# Patient Record
Sex: FEMALE
[De-identification: names, ages, dates, MRNs, and addresses within clinical notes are randomized per-mention and may not be internally consistent; named-entity substitution may affect disease eponyms.]

---

## 2018-07-10 ENCOUNTER — HOSPITAL ENCOUNTER (EMERGENCY)
Dept: HOSPITAL 97 - ER | Age: 23
Discharge: HOME | End: 2018-07-10
Payer: COMMERCIAL

## 2018-07-10 DIAGNOSIS — R10.9: Primary | ICD-10-CM

## 2018-07-10 DIAGNOSIS — D64.9: ICD-10-CM

## 2018-07-10 LAB
ALBUMIN SERPL BCP-MCNC: 3.5 G/DL (ref 3.4–5)
ALP SERPL-CCNC: 74 U/L (ref 45–117)
ALT SERPL W P-5'-P-CCNC: 19 U/L (ref 12–78)
AST SERPL W P-5'-P-CCNC: 21 U/L (ref 15–37)
BUN BLD-MCNC: 8 MG/DL (ref 7–18)
GLUCOSE SERPLBLD-MCNC: 89 MG/DL (ref 74–106)
HCT VFR BLD CALC: 32.1 % (ref 36–45)
INR BLD: 1.08
LYMPHOCYTES # SPEC AUTO: 1.5 K/UL (ref 0.7–4.9)
MAGNESIUM SERPL-MCNC: 1.9 MG/DL (ref 1.8–2.4)
MCH RBC QN AUTO: 27.2 PG (ref 27–35)
MCV RBC: 81.7 FL (ref 80–100)
PMV BLD: 8.7 FL (ref 7.6–11.3)
POTASSIUM SERPL-SCNC: 3.5 MMOL/L (ref 3.5–5.1)
RBC # BLD: 3.93 M/UL (ref 3.86–4.86)

## 2018-07-10 PROCEDURE — 81025 URINE PREGNANCY TEST: CPT

## 2018-07-10 PROCEDURE — 81003 URINALYSIS AUTO W/O SCOPE: CPT

## 2018-07-10 PROCEDURE — 83735 ASSAY OF MAGNESIUM: CPT

## 2018-07-10 PROCEDURE — 71275 CT ANGIOGRAPHY CHEST: CPT

## 2018-07-10 PROCEDURE — 96374 THER/PROPH/DIAG INJ IV PUSH: CPT

## 2018-07-10 PROCEDURE — 36415 COLL VENOUS BLD VENIPUNCTURE: CPT

## 2018-07-10 PROCEDURE — 85025 COMPLETE CBC W/AUTO DIFF WBC: CPT

## 2018-07-10 PROCEDURE — 71045 X-RAY EXAM CHEST 1 VIEW: CPT

## 2018-07-10 PROCEDURE — 99285 EMERGENCY DEPT VISIT HI MDM: CPT

## 2018-07-10 PROCEDURE — 74175 CTA ABDOMEN W/CONTRAST: CPT

## 2018-07-10 PROCEDURE — 93005 ELECTROCARDIOGRAM TRACING: CPT

## 2018-07-10 PROCEDURE — 96361 HYDRATE IV INFUSION ADD-ON: CPT

## 2018-07-10 PROCEDURE — 85610 PROTHROMBIN TIME: CPT

## 2018-07-10 PROCEDURE — 80076 HEPATIC FUNCTION PANEL: CPT

## 2018-07-10 PROCEDURE — 84484 ASSAY OF TROPONIN QUANT: CPT

## 2018-07-10 PROCEDURE — 96375 TX/PRO/DX INJ NEW DRUG ADDON: CPT

## 2018-07-10 PROCEDURE — 80048 BASIC METABOLIC PNL TOTAL CA: CPT

## 2018-07-10 NOTE — RAD REPORT
EXAM DESCRIPTION:  CT - Angio  Aorta For Dissection - 7/10/2018 1:07 pm

 

CLINICAL HISTORY:  . Chest pain/abdominal pain

 

COMPARISON:  None

 

TECHNIQUE:  Computed tomography angiography of the chest, abdomen pelvis were obtained. 100 cc Isovue
 370 was administered intravenously. Coronal and sagittal reconstruction were performed.

 

MIP 3D reconstruction was performed

 

All CT scans are performed using dose optimization technique as appropriate and may include automated
 exposure control or mA/KV adjustment according to patient size.

 

FINDINGS:   An aortic dissection is not seen. An aortic aneurysm is not displayed.

 

The celiac, SMA and CADY are patent .

 

A lung consolidation is not present. A pericardial effusion is not seen. A pleural effusion is not  n
oted.

 

The liver,spleen, pancreas adrenals kidneys demonstrate no significant abnormality.

 

The appendix is normal. There no evidence diverticulitis. No ascites is noted.

 

A ventral hernia within the mid abdomen contains fat. The neck measures 23 millimeters. The hernia sa
c measures 50  millimeters.

 

The endometrium appears mildly prominent

 

IMPRESSION:   Negative for an aortic dissection.

 

Ventral hernia

 

Mildly prominent endometrium probably related to the normal menstrual cycle. Follow-up ultrasound cou
ple months is recommended to reassess the endometrium

## 2018-07-10 NOTE — ER
Nurse's Notes                                                                                     

 [facility]                                                                

Name: Lorena Rice                                                                                

Age: 22 yrs                                                                                       

Sex: Female                                                                                       

: 1995                                                                                   

MRN: [id number]                                                                                   

Arrival Date: 07/10/2018                                                                          

Time: 12:04                                                                                       

Account#: [id number]                                                                            

Bed 15                                                                                            

Private MD:                                                                                       

Diagnosis: Chest pain, unspecified;Unspecified abdominal pain;Anemia, unspecified                 

                                                                                                  

Presentation:                                                                                     

07/10                                                                                             

12:06 Presenting complaint: EMS states: Pt was preforming normal work duties as    ph  

      at [place] and began to experience L sided chest pain that radiates to the back, denies N/V     

      or SOB, VSS, 12 lead NSR, 324 aspirin administered. Transition of care: patient was not     

      received from another setting of care. Onset of symptoms was July 10, 2018. Risk            

      Assessment: Do you want to hurt yourself or someone else? Patient reports no desire to      

      harm self or others. Initial Sepsis Screen: Does the patient meet any 2 criteria? No.       

      Patient's initial sepsis screen is negative. Does the patient have a suspected source       

      of infection? No. Patient's initial sepsis screen is negative. Care prior to arrival:       

      Medication(s) given: ASA, 81 mg, x 4.                                                       

12:06 Method Of Arrival: EMS: [facility]                                                         ph  

12:06 Acuity: VANESSA 3                                                                           ph  

                                                                                                  

OB/GYN:                                                                                           

12:08 LMP 2018                                                                           ph  

                                                                                                  

Historical:                                                                                       

- Allergies:                                                                                      

12:10 No Known Allergies;                                                                     ph  

- Home Meds:                                                                                      

12:10 oral contraceptive [Active];                                                            ph  

- PMHx:                                                                                           

12:10 None;                                                                                   ph  

- PSHx:                                                                                           

12:10 None;                                                                                   ph  

                                                                                                  

- Immunization history:: Adult Immunizations unknown.                                             

- Social history:: Smoking status: Patient/guardian denies using tobacco.                         

- Ebola Screening: : No symptoms or risks identified at this time.                                

                                                                                                  

                                                                                                  

Screenin:10 Abuse screen: Denies threats or abuse. Nutritional screening: No deficits noted.        rb1 

      Tuberculosis screening: No symptoms or risk factors identified. Fall Risk None              

      identified.                                                                                 

                                                                                                  

Assessment:                                                                                       

12:10 General: Appears uncomfortable, obese, Behavior is calm, cooperative. Pain: Complains   rb1 

      of pain in left chest Pain radiates to back Pain currently is 8 out of 10 on a pain         

      scale. Pain began 1 hour ago. Neuro: Level of Consciousness is awake, alert, obeys          

      commands, Oriented to person, place, time, situation. Cardiovascular: Capillary refill      

      < 3 seconds is brisk in bilateral fingers. Respiratory: Airway is patent Respiratory        

      effort is even, unlabored, Respiratory pattern is regular, symmetrical. GI: No signs        

      and/or symptoms were reported involving the gastrointestinal system. : No signs           

      and/or symptoms were reported regarding the genitourinary system. Derm: Skin is dry,        

      Skin is normal, Skin temperature is warm. Musculoskeletal: Range of motion: intact in       

      all extremities.                                                                            

13:10 Reassessment: Patient appears in no apparent distress at this time. Patient and/or      rb1 

      family updated on plan of care and expected duration. Pain level reassessed. Patient is     

      alert, oriented x 3, equal unlabored respirations, skin warm/dry/pink. Coworkers at         

      bedside.                                                                                    

14:09 Reassessment: Patient appears in no apparent distress at this time. No changes from     rb1 

      previously documented assessment.                                                           

                                                                                                  

Vital Signs:                                                                                      

12:08  / 85; Pulse 64; Resp 18; Temp 97.7(TE); Pulse Ox 100% on R/A; Weight 101.6 kg;   ph  

      Height 5 ft. 8 in. (172.72 cm); Pain 8/10;                                                  

13:08  / 95; Pulse 59; Resp 19; Pulse Ox 100% on R/A;                                   dh3 

13:53  / 83; Pulse 58; Resp 17; Pulse Ox 100% on R/A;                                   dh3 

14:39  / 95; Pulse 58; Resp 20; Pulse Ox 100% on R/A; Pain 3/10;                        rb1 

12:08 Body Mass Index 34.06 (101.60 kg, 172.72 cm)                                            ph  

                                                                                                  

ED Course:                                                                                        

12:04 Patient arrived in ED.                                                                  ss  

12:05 Silver Anthony MD is Attending Physician.                                              gs  

12:08 Triage completed.                                                                       ph  

12:10 Arm band placed on.                                                                     ph  

12:10 Patient has correct armband on for positive identification. Placed in gown. Bed in low  rb1 

      position. Call light in reach. Side rails up X2. Cardiac monitor on. Pulse ox on. NIBP      

      on. Warm blanket given.                                                                     

12:10 Inserted saline lock: 20 gauge in right antecubital area, using aseptic technique.      dh3 

      Blood collected.                                                                            

12:10 Patient maintains SpO2 saturation greater than 95% on room air.                         rb1 

12:20 Beulah Solano, RN is Primary Nurse.                                                   rb1 

12:20 Initial lab(s) drawn, by me, sent to lab.                                               3 

12:45 Urine collected: clean catch specimen, clear.                                           3 

12:49 X-ray completed. Portable x-ray completed in exam room.                                 jr1 

12:50 XRAY Chest (1 view) In Process Unspecified.                                             EDMS

12:57 Patient moved to CT.                                                                    vr  

12:57 Patient moved to CT.                                                                      

13:03 CT completed. Patient tolerated procedure well. Patient moved back from CT.               

13:03 EKG done, by EKG tech. reviewed by Silver Anthony MD.                                    [facility] 

13:07 CT Aorta for Dissection In Process Unspecified.                                         EDMS

14:40 No provider procedures requiring assistance completed. IV discontinued, intact,         rb1 

      bleeding controlled, No redness/swelling at site. Pressure dressing applied.                

                                                                                                  

Administered Medications:                                                                         

12:38 Drug: Zofran 4 mg Route: IVP; Site: right antecubital;                                  rb1 

13:00 Follow up: Response: No adverse reaction; Nausea is decreased                           rb1 

12:42 Drug: fentaNYL (PF) 25 mcg Route: IVP; Site: right antecubital;                         rb1 

13:00 Follow up: Response: No adverse reaction; Pain is decreased                             rb1 

13:30 Drug: NS 0.9% 1000 ml Route: IV; Rate: 1 bolus; Site: right antecubital;                rb1 

14:34 Follow up: IV Status: Completed infusion                                                rb1 

                                                                                                  

                                                                                                  

Outcome:                                                                                          

14:17 Discharge ordered by MD.                                                                  

14:40 Discharged to home ambulatory.                                                          rb1 

14:40 Condition: stable                                                                           

14:40 Discharge instructions given to patient, Instructed on discharge instructions, follow       

      up and referral plans. Demonstrated understanding of instructions, follow-up care,          

      Prescriptions given X none                                                                  

14:40 Patient left the ED.                                                                    rb1 

                                                                                                  

Signatures:                                                                                       

Dispatcher [employer]                           EDMS                                                 

Sandie Eugene                           jr1                                                  

Deloris Dave, Muna Yo RN                                                                                 

Caryn Stanton RN RN ph Warren, Kacie                                                                                 

Beulah Solano, RN                     RN   [facility]                                                  

Joan Solis                              [facility]                                                  

Silver Anthony MD MD                                                      

Jeannine Duckworth                              3                                                  

                                                                                                  

Corrections: (The following items were deleted from the chart)                                    

14:40 13:10 Reassessment: Patient appears in no apparent distress at this time. Patient       rb1 

      and/or family updated on plan of care and expected duration. Pain level reassessed.         

      Patient is alert, oriented x 3, equal unlabored respirations, skin warm/dry/pink. rb1       

14:48 14:45 Patient left the ED. rb1                                                          rb1 

                                                                                                  

**************************************************************************************************

## 2018-07-10 NOTE — RAD REPORT
EXAM DESCRIPTION:  Smita Single View7/10/2018 12:53 pm

 

CLINICAL HISTORY:  Chest pain

 

COMPARISON:  none

 

FINDINGS:   The lungs appear clear of acute infiltrate. The heart is upper limits normal size

 

IMPRESSION:   No acute abnormalities displayed

## 2018-07-10 NOTE — EKG
Test Date:    2018-07-10               Test Time:    12:23:01

Technician:   YULY                                     

                                                     

MEASUREMENT RESULTS:                                       

Intervals:                                           

Rate:         60                                     

AZ:           180                                    

QRSD:         80                                     

QT:           422                                    

QTc:          422                                    

Axis:                                                

P:            37                                     

AZ:           180                                    

QRS:          18                                     

T:            20                                     

                                                     

INTERPRETIVE STATEMENTS:                                       

                                                     

Normal sinus rhythm

Normal ECG

No previous ECG available for comparison



Electronically Signed On 07-10-18 21:36:25 CDT by Moose Burch

## 2018-09-11 ENCOUNTER — HOSPITAL ENCOUNTER (EMERGENCY)
Dept: HOSPITAL 97 - ER | Age: 23
Discharge: HOME | End: 2018-09-11
Payer: COMMERCIAL

## 2018-09-11 DIAGNOSIS — R53.1: Primary | ICD-10-CM

## 2018-09-11 PROCEDURE — 81025 URINE PREGNANCY TEST: CPT

## 2018-09-11 PROCEDURE — 99281 EMR DPT VST MAYX REQ PHY/QHP: CPT

## 2018-09-11 PROCEDURE — 81003 URINALYSIS AUTO W/O SCOPE: CPT

## 2018-09-11 NOTE — EDPHYS
Physician Documentation                                                                           

 [facility]                                                                

Name: Lorena Rice                                                                                

Age: 23 yrs                                                                                       

Sex: Female                                                                                       

: 1995                                                                                   

MRN: [id number]                                                                                   

Arrival Date: 2018                                                                          

Time: 12:07                                                                                       

Account#: [id number]                                                                            

Bed 8                                                                                             

Private MD: None, None                                                                            

ED Physician Brendon Kerr                                                                      

HPI:                                                                                              

                                                                                             

12:46 This 23 yrs old Unknown Female presents to ER via Ambulatory with complaints of         antonino 

      Dizziness, Weakness.                                                                        

12:46 The patient presents with dizziness, generalized weakness. Onset: The symptoms/episode  antonino 

      began/occurred 3 day(s) ago. Context: occurred at work. Modifying factors: The symptoms     

      are alleviated by nothing, the symptoms are aggravated by nothing. Associated signs and     

      symptoms: The patient has no apparent associated signs or symptoms. Severity of             

      symptoms: At their worst the symptoms were mild in the emergency department the             

      symptoms are unchanged. Patient's baseline: Neuro: alert and fully oriented. The            

      patient has not experienced similar symptoms in the past.                                   

                                                                                                  

OB/GYN:                                                                                           

12:12 LMP 2018                                                                             

                                                                                                  

Historical:                                                                                       

- Allergies:                                                                                      

12:11 No Known Allergies;                                                                     hj  

- Home Meds:                                                                                      

12:11 Oral Contraceptive [Active];                                                            hj  

- PMHx:                                                                                           

12:11 None;                                                                                   hj  

- PSHx:                                                                                           

12:11 None;                                                                                   hj  

                                                                                                  

- Immunization history:: Adult Immunizations up to date.                                          

- Social history:: Smoking status: Patient/guardian denies using tobacco,                         

  Patient/guardian denies using alcohol.                                                          

- Ebola Screening: : Patient negative for fever greater than or equal to 101.5 degrees            

  Fahrenheit, and additional compatible Ebola Virus Disease symptoms Patient denies               

  exposure to infectious person Patient denies travel to an Ebola-affected area in the            

  21 days before illness onset.                                                                   

- Family history:: not pertinent.                                                                 

                                                                                                  

                                                                                                  

ROS:                                                                                              

12:46 Constitutional: Negative for fever, chills, and weight loss, Eyes: Negative for injury, antonino 

      pain, redness, and discharge, ENT: Negative for injury, pain, and discharge, Neck:          

      Negative for injury, pain, and swelling, Cardiovascular: Negative for chest pain,           

      palpitations, and edema, Respiratory: Negative for shortness of breath, cough,              

      wheezing, and pleuritic chest pain, Abdomen/GI: Negative for abdominal pain, nausea,        

      vomiting, diarrhea, and constipation, Back: Negative for injury and pain, : Negative      

      for injury, bleeding, discharge, and swelling, MS/Extremity: Negative for injury and        

      deformity, Skin: Negative for injury, rash, and discoloration, Psych: Negative for          

      depression, anxiety, suicide ideation, homicidal ideation, and hallucinations,              

      Allergy/Immunology: Negative for hives, rash, and allergies, Endocrine: Negative for        

      neck swelling, polydipsia, polyuria, polyphagia, and marked weight changes,                 

      Hematologic/Lymphatic: Negative for swollen nodes, abnormal bleeding, and unusual           

      bruising.                                                                                   

12:46 Neuro: Positive for dizziness, weakness.                                                    

                                                                                                  

Exam:                                                                                             

12:46 Constitutional:  This is a well developed, well nourished patient who is awake, alert,  antonino 

      and in no acute distress. Head/Face:  Normocephalic, atraumatic. Eyes:  Pupils equal        

      round and reactive to light, extra-ocular motions intact.  Lids and lashes normal.          

      Conjunctiva and sclera are non-icteric and not injected.  Cornea within normal limits.      

      Periorbital areas with no swelling, redness, or edema. ENT:  Nares patent. No nasal         

      discharge, no septal abnormalities noted.  Tympanic membranes are normal and external       

      auditory canals are clear.  Oropharynx with no redness, swelling, or masses, exudates,      

      or evidence of obstruction, uvula midline.  Mucous membranes moist. Neck:  Trachea          

      midline, no thyromegaly or masses palpated, and no cervical lymphadenopathy.  Supple,       

      full range of motion without nuchal rigidity, or vertebral point tenderness.  No            

      Meningismus. Chest/axilla:  Normal chest wall appearance and motion.  Nontender with no     

      deformity.  No lesions are appreciated. Cardiovascular:  Regular rate and rhythm with a     

      normal S1 and S2.  No gallops, murmurs, or rubs.  Normal PMI, no JVD.  No pulse             

      deficits. Respiratory:  Lungs have equal breath sounds bilaterally, clear to                

      auscultation and percussion.  No rales, rhonchi or wheezes noted.  No increased work of     

      breathing, no retractions or nasal flaring. Abdomen/GI:  Soft, non-tender, with normal      

      bowel sounds.  No distension or tympany.  No guarding or rebound.  No evidence of           

      tenderness throughout. Back:  No spinal tenderness.  No costovertebral tenderness.          

      Full range of motion. Skin:  Warm, dry with normal turgor.  Normal color with no            

      rashes, no lesions, and no evidence of cellulitis. MS/ Extremity:  Pulses equal, no         

      cyanosis.  Neurovascular intact.  Full, normal range of motion. Neuro:  Awake and           

      alert, GCS 15, oriented to person, place, time, and situation.  Cranial nerves II-XII       

      grossly intact.  Motor strength 5/5 in all extremities.  Sensory grossly intact.            

      Cerebellar exam normal.  Normal gait. Psych:  Awake, alert, with orientation to person,     

      place and time.  Behavior, mood, and affect are within normal limits.                       

                                                                                                  

Vital Signs:                                                                                      

12:12  / 70; Pulse 85; Resp 18; Temp 97.1(TE); Pulse Ox 99% on R/A; Weight 104.33 kg;   hj  

      Height 5 ft. 8 in. (172.72 cm); Pain 5/10;                                                  

12:12 Body Mass Index 34.97 (104.33 kg, 172.72 cm)                                            hj  

                                                                                                  

MDM:                                                                                              

12:18 Patient medically screened.                                                             [facility] 

                                                                                                  

                                                                                             

12:39 Order name: Urine Dipstick--Ancillary (enter results); Complete Time: 12:46             [facility] 

                                                                                             

12:48 Order name: Urine Pregnancy--Ancillary (enter results)                                  [facility] 

                                                                                             

12:46 Order name: Urine Pregnancy Test (obtain specimen); Complete Time: 13:05                [facility] 

                                                                                                  

Administered Medications:                                                                         

No medications were administered                                                                  

                                                                                                  

                                                                                                  

Disposition:                                                                                      

18 12:49 Discharged to Home. Impression: Weakness, Dizziness and giddiness.                 

- Condition is Stable.                                                                            

- Discharge Instructions: Dizziness, Weakness, Weakness, Easy-to-Read, Dizziness,                 

  Easy-to-Read.                                                                                   

                                                                                                  

- Medication Reconciliation Form, Thank You Letter, Antibiotic Education, Prescription            

  Opioid Use form.                                                                                

- Follow up: Private Physician; When: 2 - 3 days; Reason: Recheck today's complaints,             

  Continuance of care, Re-evaluation by your physician.                                           

- Problem is new.                                                                                 

- Symptoms have improved.                                                                         

                                                                                                  

                                                                                                  

                                                                                                  

Signatures:                                                                                       

Dispatcher [employer]                           EDMS                                                 

Paula Knutson Corey, MD MD cha Joaquin, Henry, RN                      RN   hj                                                   

                                                                                                  

Corrections: (The following items were deleted from the chart)                                    

13:21 12:49 2018 12:49 Discharged to Home. Impression: Weakness; Dizziness and          bd  

      giddiness. Condition is Stable. Forms are Medication Reconciliation Form, Thank You         

      Letter, Antibiotic Education, Prescription Opioid Use. Follow up: Private Physician;        

      When: 2 - 3 days; Reason: Recheck today's complaints, Continuance of care,                  

      Re-evaluation by your physician. Problem is new. Symptoms have improved. antonino                

                                                                                                  

**************************************************************************************************

## 2018-09-11 NOTE — ER
Nurse's Notes                                                                                     

 [facility]                                                                

Name: Lorena Rice                                                                                

Age: 23 yrs                                                                                       

Sex: Female                                                                                       

: 1995                                                                                   

MRN: [id number]                                                                                   

Arrival Date: 2018                                                                          

Time: 12:07                                                                                       

Account#: [id number]                                                                            

Bed 8                                                                                             

Private MD: None, None                                                                            

Diagnosis: Weakness;Dizziness and giddiness                                                       

                                                                                                  

Presentation:                                                                                     

                                                                                             

12:09 Presenting complaint: Patient states: i had dizziness spells that started 2 weeks ago,  hj  

      it comes coming back, my body feels tired; denies fever and chills; denies nausea and       

      vomiting; denies loss of appetite; reports headache, 5/10; denies chest pain; denies        

      SOB;. Transition of care: patient was not received from another setting of care.            

12:09 Method Of Arrival: Ambulatory                                                             

12:11 Onset of symptoms was 2018. Risk Assessment: Do you want to hurt yourself or hj  

      someone else? Patient reports no desire to harm self or others. Initial Sepsis Screen:      

      Does the patient meet any 2 criteria? No. Patient's initial sepsis screen is negative.      

      Does the patient have a suspected source of infection? No. Patient's initial sepsis         

      screen is negative. Care prior to arrival: None.                                            

12:11 Acuity: VANESSA 3                                                                           hj  

                                                                                                  

Triage Assessment:                                                                                

12:12 General: Appears in no apparent distress. uncomfortable, Behavior is calm, cooperative, hj  

      appropriate for age. Pain: Complains of pain in head Pain currently is 5 out of 10 on a     

      pain scale.                                                                                 

                                                                                                  

OB/GYN:                                                                                           

12:12 LMP 2018                                                                             

                                                                                                  

Historical:                                                                                       

- Allergies:                                                                                      

12:11 No Known Allergies;                                                                     hj  

- Home Meds:                                                                                      

12:11 Oral Contraceptive [Active];                                                            hj  

- PMHx:                                                                                           

12:11 None;                                                                                   hj  

- PSHx:                                                                                           

12:11 None;                                                                                   hj  

                                                                                                  

- Immunization history:: Adult Immunizations up to date.                                          

- Social history:: Smoking status: Patient/guardian denies using tobacco,                         

  Patient/guardian denies using alcohol.                                                          

- Ebola Screening: : Patient negative for fever greater than or equal to 101.5 degrees            

  Fahrenheit, and additional compatible Ebola Virus Disease symptoms Patient denies               

  exposure to infectious person Patient denies travel to an Ebola-affected area in the            

  21 days before illness onset.                                                                   

- Family history:: not pertinent.                                                                 

                                                                                                  

                                                                                                  

Screenin:12 Abuse screen: Denies threats or abuse. Denies injuries from another. Nutritional        hj  

      screening: No deficits noted. Tuberculosis screening: No symptoms or risk factors           

      identified. Fall Risk None identified.                                                      

                                                                                                  

Assessment:                                                                                       

12:20 General: Appears comfortable, Behavior is calm, cooperative. Pain: Complains of pain in aa5 

      forehead Pain does not radiate. Pain currently is 5 out of 10 on a pain scale. Quality      

      of pain is described as aching, Pain began 2-3 days ago. Is continuous. Neuro: Level of     

      Consciousness is awake, alert, obeys commands, Oriented to person, place, time,             

      situation,  are equal bilaterally Moves all extremities. Gait is steady, Speech is     

      normal, Facial symmetry appears normal, Pupils are PERRLA, Reports intermittent             

      dizziness x 2 weeks ago . Cardiovascular: Heart tones S1 S2 present Rhythm is regular.      

      Respiratory: Reports cough that is dry, Airway is patent Respiratory effort is even,        

      unlabored, Respiratory pattern is regular, symmetrical, Breath sounds are clear             

      bilaterally. GI: No signs and/or symptoms were reported involving the gastrointestinal      

      system. Patient currently denies diarrhea, nausea, vomiting. : No signs and/or            

      symptoms were reported regarding the genitourinary system. EENT: Reports nasal              

      congestion. Derm: Skin is dry, Skin is normal, Skin temperature is warm.                    

      Musculoskeletal: Range of motion: intact in all extremities.                                

13:18 Neuro: Level of Consciousness is awake, alert, obeys commands, Oriented to person,      aa5 

      place, time, situation. Respiratory: Airway is patent Respiratory effort is even,           

      unlabored, Respiratory pattern is regular, symmetrical. Derm: Skin is dry, Skin is          

      normal, Skin temperature is warm.                                                           

                                                                                                  

Vital Signs:                                                                                      

12:12  / 70; Pulse 85; Resp 18; Temp 97.1(TE); Pulse Ox 99% on R/A; Weight 104.33 kg;   hj  

      Height 5 ft. 8 in. (172.72 cm); Pain 5/10;                                                  

12:12 Body Mass Index 34.97 (104.33 kg, 172.72 cm)                                              

                                                                                                  

ED Course:                                                                                        

12:07 Patient arrived in ED.                                                                  mr  

12:08 None, None is Private Physician.                                                        mr  

12:11 Triage completed.                                                                       hj  

12:12 Arm band placed on left wrist.                                                          hj  

12:14 Patient has correct armband on for positive identification. Placed in gown. Bed in low  hj  

      position. Call light in reach. Side rails up X 1.                                           

12:18 Brendon Kerr MD is Attending Physician.                                             [facility] 

12:21 Jasmyne Baird, RN is Primary Nurse.                                                   aa5 

13:18 No provider procedures requiring assistance completed. Patient did not have IV access   aa5 

      during this emergency room visit.                                                           

                                                                                                  

Administered Medications:                                                                         

No medications were administered                                                                  

                                                                                                  

                                                                                                  

Outcome:                                                                                          

12:49 Discharge ordered by MD.                                                                [facility] 

13:18 Discharged to home ambulatory.                                                          aa5 

13:18 Condition: stable                                                                           

13:18 Discharge instructions given to patient, Instructed on discharge instructions, follow       

      up and referral plans. Demonstrated understanding of instructions, follow-up care.          

13:21 Patient left the ED.                                                                    bd  

                                                                                                  

Signatures:                                                                                       

Paula Knutson Corey, MD MD cha Rivera, Maria                                mr                                                   

Jasmyne Baird, RN                     RN   aa5                                                  

Gopi Dahl RN                      RN                                                      

                                                                                                  

Corrections: (The following items were deleted from the chart)                                    

12:15 12:12 Pulse 93bpm; Resp 18bpm; Pulse Ox 99% RA; Temp 97.1F Temporal; 104.33 kg; Height  hj  

      5 ft. 8 in.; BMI: 34.9; Pain 5/10; hj                                                       

                                                                                                  

**************************************************************************************************

## 2023-09-02 ENCOUNTER — HOSPITAL ENCOUNTER (INPATIENT)
Dept: HOSPITAL 97 - ER | Age: 28
LOS: 2 days | Discharge: HOME | DRG: 419 | End: 2023-09-04
Attending: SURGERY | Admitting: SURGERY
Payer: SELF-PAY

## 2023-09-02 VITALS — OXYGEN SATURATION: 100 %

## 2023-09-02 VITALS — BODY MASS INDEX: 40.1 KG/M2

## 2023-09-02 DIAGNOSIS — F17.290: ICD-10-CM

## 2023-09-02 DIAGNOSIS — F32.A: ICD-10-CM

## 2023-09-02 DIAGNOSIS — K80.12: Primary | ICD-10-CM

## 2023-09-02 DIAGNOSIS — N92.0: ICD-10-CM

## 2023-09-02 DIAGNOSIS — F41.9: ICD-10-CM

## 2023-09-02 LAB
ALBUMIN SERPL BCP-MCNC: 3.5 G/DL (ref 3.4–5)
ALP SERPL-CCNC: 75 U/L (ref 45–117)
ALT SERPL W P-5'-P-CCNC: 18 U/L (ref 13–56)
ANISOCYTOSIS BLD QL: SLIGHT
AST SERPL W P-5'-P-CCNC: 19 U/L (ref 15–37)
BLD SMEAR INTERP: (no result)
BUN BLD-MCNC: 7 MG/DL (ref 7–18)
GLUCOSE SERPLBLD-MCNC: 86 MG/DL (ref 74–106)
HCT VFR BLD CALC: 29.5 % (ref 36–45)
LIPASE SERPL-CCNC: 42 U/L (ref 13–75)
LYMPHOCYTES # SPEC AUTO: 2.9 K/UL (ref 0.7–4.9)
MCV RBC: 68.3 FL (ref 80–100)
MORPHOLOGY BLD-IMP: (no result)
PMV BLD: 9 FL (ref 7.6–11.3)
POLYCHROMASIA BLD QL SMEAR: SLIGHT
POTASSIUM SERPL-SCNC: 3.9 MEQ/L (ref 3.5–5.1)
RBC # BLD: 4.31 M/UL (ref 3.86–4.86)
WBC # BLD AUTO: 7 THOU/UL (ref 4.3–10.9)

## 2023-09-02 PROCEDURE — 80053 COMPREHEN METABOLIC PANEL: CPT

## 2023-09-02 PROCEDURE — 36415 COLL VENOUS BLD VENIPUNCTURE: CPT

## 2023-09-02 PROCEDURE — 83690 ASSAY OF LIPASE: CPT

## 2023-09-02 PROCEDURE — 81001 URINALYSIS AUTO W/SCOPE: CPT

## 2023-09-02 PROCEDURE — 74177 CT ABD & PELVIS W/CONTRAST: CPT

## 2023-09-02 PROCEDURE — 71045 X-RAY EXAM CHEST 1 VIEW: CPT

## 2023-09-02 PROCEDURE — 88304 TISSUE EXAM BY PATHOLOGIST: CPT

## 2023-09-02 PROCEDURE — 80076 HEPATIC FUNCTION PANEL: CPT

## 2023-09-02 PROCEDURE — 85025 COMPLETE CBC W/AUTO DIFF WBC: CPT

## 2023-09-02 PROCEDURE — 74300 X-RAY BILE DUCTS/PANCREAS: CPT

## 2023-09-02 PROCEDURE — 76705 ECHO EXAM OF ABDOMEN: CPT

## 2023-09-02 PROCEDURE — 94010 BREATHING CAPACITY TEST: CPT

## 2023-09-02 PROCEDURE — BF10YZZ FLUOROSCOPY OF BILE DUCTS USING OTHER CONTRAST: ICD-10-PCS

## 2023-09-02 PROCEDURE — 82565 ASSAY OF CREATININE: CPT

## 2023-09-02 PROCEDURE — 80048 BASIC METABOLIC PNL TOTAL CA: CPT

## 2023-09-02 PROCEDURE — 81025 URINE PREGNANCY TEST: CPT

## 2023-09-02 PROCEDURE — 99285 EMERGENCY DEPT VISIT HI MDM: CPT

## 2023-09-02 PROCEDURE — 0FT44ZZ RESECTION OF GALLBLADDER, PERCUTANEOUS ENDOSCOPIC APPROACH: ICD-10-PCS

## 2023-09-02 RX ADMIN — HYDROCODONE BITARTRATE AND ACETAMINOPHEN PRN TAB: 7.5; 325 TABLET ORAL at 21:34

## 2023-09-02 RX ADMIN — DEXTROSE AND SODIUM CHLORIDE SCH MLS: 5; .45 INJECTION, SOLUTION INTRAVENOUS at 20:08

## 2023-09-02 RX ADMIN — HYDROMORPHONE HYDROCHLORIDE ONE MG: 1 INJECTION, SOLUTION INTRAMUSCULAR; INTRAVENOUS; SUBCUTANEOUS at 18:07

## 2023-09-02 RX ADMIN — SODIUM CHLORIDE, SODIUM LACTATE, POTASSIUM CHLORIDE, AND CALCIUM CHLORIDE ONE MLS: .6; .31; .03; .02 INJECTION, SOLUTION INTRAVENOUS at 17:56

## 2023-09-02 RX ADMIN — SODIUM CHLORIDE, SODIUM LACTATE, POTASSIUM CHLORIDE, AND CALCIUM CHLORIDE ONE MLS: .6; .31; .03; .02 INJECTION, SOLUTION INTRAVENOUS at 15:30

## 2023-09-02 RX ADMIN — MORPHINE SULFATE PRN MG: 4 INJECTION, SOLUTION INTRAMUSCULAR; INTRAVENOUS at 10:18

## 2023-09-02 RX ADMIN — MORPHINE SULFATE PRN MG: 4 INJECTION, SOLUTION INTRAMUSCULAR; INTRAVENOUS at 06:35

## 2023-09-02 RX ADMIN — MORPHINE SULFATE PRN MG: 4 INJECTION, SOLUTION INTRAMUSCULAR; INTRAVENOUS at 23:53

## 2023-09-02 RX ADMIN — MORPHINE SULFATE PRN MG: 4 INJECTION, SOLUTION INTRAMUSCULAR; INTRAVENOUS at 20:09

## 2023-09-02 RX ADMIN — DEXTROSE AND SODIUM CHLORIDE SCH MLS: 5; .45 INJECTION, SOLUTION INTRAVENOUS at 06:36

## 2023-09-02 RX ADMIN — FAMOTIDINE SCH MG: 10 INJECTION, SOLUTION INTRAVENOUS at 20:09

## 2023-09-02 RX ADMIN — SODIUM CHLORIDE SCH MLS: 9 INJECTION, SOLUTION INTRAVENOUS at 20:07

## 2023-09-02 RX ADMIN — FAMOTIDINE SCH MG: 10 INJECTION, SOLUTION INTRAVENOUS at 10:18

## 2023-09-02 RX ADMIN — DEXTROSE AND SODIUM CHLORIDE SCH: 5; .45 INJECTION, SOLUTION INTRAVENOUS at 14:19

## 2023-09-02 RX ADMIN — HYDROMORPHONE HYDROCHLORIDE ONE MG: 1 INJECTION, SOLUTION INTRAMUSCULAR; INTRAVENOUS; SUBCUTANEOUS at 18:30

## 2023-09-02 RX ADMIN — SODIUM CHLORIDE SCH: 9 INJECTION, SOLUTION INTRAVENOUS at 13:00

## 2023-09-02 NOTE — P.OP
Preoperative diagnosis: Acute on chronic cholecystitis with cholelithiasis


Postoperative diagnosis: The same


Primary procedure: Laparoscopic cholecystectomy


Secondary procedure: Intraoperative cholangiogram


Anesthesia: General


Estimated blood loss: Less than 20 cc


Specimen: Gallbladder and contents


Operative Technique: 





The patient was brought to the operating room placed supine on the table.  After

the induction of adequate general endotracheal anesthesia, the area of the 

abdomen was prepped with a DuraPrep solution, and she was draped in the usual 

aseptic manner.





A subumbilical incision was made.  This brought down through the skin and 

subcutaneous tissue.  We slowly entered the peritoneal cavity using the 

Visiport.  On entering the peritoneal cavity, a pneumoperitoneum was created to 

approximately 12 mmHg.  Under direct vision a 5 mm trocar was placed in the 

upper midline, and 2 other fives on the right lateral side.  The patient was 

then placed in reverse Trendelenburg.  The table was rolled to the left.  We 

could visualize of the right upper quadrant a long redundant gallbladder that 

was hanging literally from the right lobe of the liver.  A grasper was placed at

the junction of the gallbladder just with the liver edge.  Another 1 after now 

we could elevate and expose the whole body of the gallbladder.  It was noted to 

be quite tense and distended in this lower portion.  A grasper was placed around

Garcia's pouch.  Chronic thickened peritoneum could be seen with edema lying 

underneath it.  This area was cleared at the junction of the gallbladder with th

e cystic duct.  We also cleared off the cystic artery.  Having obtained the 

critical view it was now possible to clip the cystic artery.  Another clip was 

placed between the gallbladder and the cystic duct.  An opening was made into 

the cystic duct through which we obtained an intraoperative cholangiogram the 

cholangiogram did show good flow of contrast finally down into the duodenum.  

There was interesting to note the amount of valves that are visible on the 

proximal portion of the cystic duct.  The catheter was now removed.  Clips were 

placed on the distal portion of the cystic duct which was fully transected.  The

gallbladder then was dissected free from the liver bed, placed into an Endo 

Catch, and brought out through the umbilical trocar site.  With the 5 mm camera 

in the right upper quadrant we were now able to visualize the area around the 

umbilicus.  There was some preperitoneal fat but no massive hernia was noted 

from the inside.  There does appear to be some laxity but we did not increase 

the dissection or remove any of this area as with the admission today was the 

gallbladder.  The Endo Close was now used to place 2 sutures to approximate the 

fascial defect at the umbilicus.  A good air seal having been achieved the 

sutures were tied, the irrigating fluid was aspirated from the peritoneal 

cavity, and the pneumoperitoneum was collapsed.  The trocars were then removed. 

Staples were applied to the skin.





At the end of the procedure she was in a stable condition was wheeled to the 

recovery room.  Needle sponge instrument count were correct.  No drains were 

placed.


Complications: None


Transferred to: Recovery Room


Condition: Good

## 2023-09-02 NOTE — RAD REPORT
EXAM DESCRIPTION:  RAD - Chest Single View - 9/2/2023 2:50 am

 

CLINICAL HISTORY:  Epigastric pain

 

TECHNIQUE:  AP chest

 

COMPARISON:  None available for comparison

 

FINDINGS:  CHEST:

Heart: The cardiomediastinal silhouette is within normal limits.

Lungs: No focal consolidation.

Mediastinum: Unremarkable

Pleura: No appreciable effusion. No pneumothorax.

Bones: Intact

 

IMPRESSION:  No acute cardiopulmonary disease.

 

Electronically signed by:   Av Burr MD   9/2/2023 3:07 AM CDT Workstation: [id number]

 

 

 

 

Due to temporary technical issues with the PACS/Fluency reporting system, reports are being signed by
 the in house radiologists without review as a courtesy to insure prompt reporting. The interpreting 
radiologist is fully responsible for the content of the report.

## 2023-09-02 NOTE — RAD REPORT
EXAM DESCRIPTION:  RAD - Cholangiogram Oper-Xray Or - 9/2/2023 6:15 pm

 

CLINICAL HISTORY:  LAP CHEYANNE W/ IOC

 

COMPARISON:  None available.

 

FINDINGS:  6 images were sent to PACS, documenting positions during an image guided laparoscopic chol
ecystectomy procedure. No radiologist was available for the procedure, nor will any image interpretat
ion he provided. Please refer to the procedural report for additional details.

 

Fluoroscopy time: 0.5 minutes.

 

IMPRESSION:  Documentation of fluoroscopy utilization as above.

## 2023-09-02 NOTE — P.HP
Date of Service: 23





PC: This is a 28-year-old female presents to the emergency room with severe 

abdominal pain for diagnosis and treatment.





HPC: Patient has been experiencing abdominal pain off and on over the last few 

months.  Yesterday however it intensified, located in the upper portion of her 

abdomen right across.  Radiates straight into her back.  Feels like she has a 

foot under her rib.





PSHx:  4 para 4, tubal ligation





PMHx: Menorrhagia





Social Hx: No known allergies





Sys R: No cough, wheeze, shortness of breath.  No chest pain or palpitations.  

Denies any urinary complaints.  States she has a supraumbilical hernia that 

occasionally causes her some discomfort.  Has recently noticed some bright red 

blood in her stools, but was constipated last week.











O/E: Awake alert vital signs are stable





HEENT: Within normal limits





Chest: Air entry equal bilaterally





Abd: Tender in the right upper quadrant





Loco: Intact





Data: Has documented cholecystitis with some edema





Impression: Acute on chronic cholecystitis with cholelithiasis, biliary colic





Plan: I will taken to the operating room for laparoscopic possible open 

cholecystectomy.  We will also do a cholangiogram.  The risks of this procedure 

have been discussed





The possibility of bleeding, infection, injury to bile ducts blood vessels and 

intestines been described.  The possible need for an open and/or further 

surgeries and procedures was discussed.  She understands and wants us to 

proceed.

## 2023-09-02 NOTE — RAD REPORT
EXAM DESCRIPTION:  US - Abdomen Exam Limited - 9/2/2023 3:38 am

 

CLINICAL HISTORY:  28 years Female ABD PAIN

 

TECHNIQUE:  Limited sonographic imaging of the gallbladder was performed on 9/2/2023 at 3: 25 AM.

Comparison:   No prior studies were available for comparison.

 

FINDINGS:  The gallbladder is relatively well distended and contains shadowing intraluminal echoes co
nsistent with gallstones. The gallbladder wall measures approximately 3 mm in diameter. There is no e
vidence of pericholecystic fluid. The common bile duct measures 6 mm in diameter which is top normal 
in size.

 

IMPRESSION:  Cholelithiasis with borderline gallbladder wall thickening and borderline dilatation of 
the common bile duct. There is no sonographic evidence of pericholecystic fluid.

 

Electronically signed by:   Debbie Ochoa DO   9/2/2023 5:13 AM CDT Workstation: [id number]

 

 

Due to temporary technical issues with the PACS/Fluency reporting system, reports are being signed by
 the in house radiologists without review as a courtesy to insure prompt reporting. The interpreting 
radiologist is fully responsible for the content of the report.

## 2023-09-02 NOTE — ER
Nurse's Notes                                                                                     

 [facility]                                                                 

Name: Lorena Rice                                                                                

Age: 28 yrs                                                                                       

Sex: Female                                                                                       

: 1995                                                                                   

MRN: [id number]                                                                                   

Arrival Date: 2023                                                                          

Time: 01:26                                                                                       

Account#: [id number]                                                                            

Bed 16                                                                                            

Private MD:                                                                                       

Diagnosis: Other cholelithiasis without obstruction;Cholecystitis, unspecified                    

                                                                                                  

Presentation:                                                                                     

                                                                                             

01:40 Chief complaint: Patient states: sharp burning shooting pain, to right upper abdomen    vc1 

      and above my belly button, I've pooped blood twice. Coronavirus screen: Client denies       

      travel out of the [place] in the last 14 days. At this time, the client does not indicate      

      any symptoms associated with coronavirus-19. Ebola Screen: Patient negative for fever       

      greater than or equal to 101.5 degrees Fahrenheit, and additional compatible Ebola          

      Virus Disease symptoms Patient denies exposure to infectious person. Patient denies         

      travel to an Ebola-affected area in the 21 days before illness onset. No symptoms or        

      risks identified at this time. Risk Assessment: Do you want to hurt yourself or someone     

      else? Patient reports no desire to harm self or others. Onset of symptoms was 2023 at 22:00.                                                                          

01:40 Method Of Arrival: Ambulatory                                                           vc1 

01:40 Acuity: VANESSA 3                                                                           vc1 

05:40 Initial Sepsis Screen: Does the patient meet any 2 criteria? No. Patient's initial      ha1 

      sepsis screen is negative. Does the patient have a suspected source of infection? No.       

      Patient's initial sepsis screen is negative.                                                

                                                                                                  

Triage Assessment:                                                                                

01:42 General: Appears in no apparent distress. uncomfortable, Behavior is calm, cooperative, vc1 

      appropriate for age. Pain: Complains of pain in umbilical area and right upper quadrant     

      Pain does not radiate. Pain currently is 7 out of 10 on a pain scale. Quality of pain       

      is described as sharp, shooting, stabbing, Pain began suddenly, 4 hours ago. Is             

      continuous, Aggravated by movement. EENT: No deficits noted. No signs and/or symptoms       

      were reported regarding the EENT system. Neuro: Level of Consciousness is awake, alert,     

      obeys commands, Oriented to person, place, time, situation, Appropriate for age.            

      Cardiovascular: No deficits noted. Respiratory: Airway is patent Respiratory effort is      

      even, unlabored, Respiratory pattern is regular, symmetrical. GI: Reports upper             

      abdominal pain, bloody stool. : No deficits noted. No signs and/or symptoms were          

      reported regarding the genitourinary system. Derm: No deficits noted. No signs and/or       

      symptoms reported regarding the dermatologic system. Musculoskeletal: No deficits           

      noted. No signs and/or symptoms reported regarding the musculoskeletal system.              

                                                                                                  

Historical:                                                                                       

- Allergies:                                                                                      

01:41 No Known Allergies;                                                                     vc1 

- Home Meds:                                                                                      

01:41 None [Active];                                                                          vc1 

- PMHx:                                                                                           

01:41 None;                                                                                   vc1 

- PSHx:                                                                                           

01:41  section; tubal ligation;                                                       vc1 

                                                                                                  

- Immunization history:: Client reports receiving the Richy \T\ Richy single-dose             

  vaccine.                                                                                        

- Social history:: Smoking status: Reported history of juuling and/or vaping.                     

                                                                                                  

                                                                                                  

Screenin:32 Abuse screen: Denies threats or abuse. Denies injuries from another. Nutritional        ha1 

      screening: No deficits noted. Tuberculosis screening: No symptoms or risk factors           

      identified.                                                                                 

05:51 [facility] ED Fall Risk Assessment (Adult) History of falling in the last 3 months,       ha1 

      including since admission No falls in past 3 months (0 pts) Confusion or Disorientation     

      No (0 pts) Intoxicated or Sedated No (0 pts) Impaired Gait No (0 pts) Mobility Assist       

      Device Used No (0 pt) Altered Elimination No (0 pt) Score/Fall Risk Level 0 - 2 = Low       

      Risk Oriented to surroundings, Maintained a safe environment, Educated pt \T\ family on     

      fall prevention, incl call for assistance when getting out of bed.                          

                                                                                                  

Assessment:                                                                                       

02:00 General: Appears uncomfortable, Behavior is calm, cooperative. Pain: Complains of pain  ha1 

      in epigastric area and umbilical area Pain does not radiate. Pain currently is 9 out of     

      10 on a pain scale. Quality of pain is described as crampy, sharp, throbbing. Neuro:        

      Level of Consciousness is awake, alert, obeys commands, Oriented to person, place,          

      time, situation. Cardiovascular: Patient's skin is warm and dry. Respiratory: Airway is     

      patent Respiratory effort is even, unlabored, Respiratory pattern is regular,               

      symmetrical. GI: Abdomen is round non-distended, Bowel sounds present X 4 quads.            

      Abdomen is tender to palpation in umbilical area Reports lower abdominal pain,              

      cramping, nausea. Musculoskeletal: Circulation, motion, and sensation intact.               

03:00 Reassessment: Patient and/or family updated on plan of care and expected duration. Pain ha1 

      level reassessed. Patient is alert, oriented x 3, equal unlabored respirations, skin        

      warm/dry/pink.                                                                              

04:00 Reassessment: Patient and/or family updated on plan of care and expected duration. Pain ha1 

      level reassessed. Patient is alert, oriented x 3, equal unlabored respirations, skin        

      warm/dry/pink.                                                                              

05:00 Reassessment: Patient and/or family updated on plan of care and expected duration. Pain ha1 

      level reassessed. Patient is alert, oriented x 3, equal unlabored respirations, skin        

      warm/dry/pink.                                                                              

                                                                                                  

Vital Signs:                                                                                      

01:40 Weight 107.05 kg; Height 5 ft. 8 in. ; Pain 7/10;                                       vc1 

01:45  / 90; Pulse 89; Resp 20; Temp 98.2; Pulse Ox 100% ;                              vc1 

02:00  / 73; Pulse 64; Resp 18 S; Pulse Ox 100% on R/A;                                 ha1 

03:30  / 73; Pulse 62; Resp 18 S; Pulse Ox 100% on R/A;                                 ha1 

04:00  / 71; Pulse 64; Resp 17 S; Pulse Ox 100% on R/A;                                 ha1 

04:30  / 75; Pulse 68; Resp 18 S; Pulse Ox 100% on R/A;                                 ha1 

05:28  / 96; Pulse 70; Resp 18 S; Pulse Ox 100% on R/A;                                 ha1 

01:40 Body Mass Index 35.88 (107.05 kg, 172.72 cm)                                            vc1 

01:40 Pain Scale: Adult                                                                       vc1 

                                                                                                  

ED Course:                                                                                        

01:28 Patient arrived in ED.                                                                  mr  

01:35 Estefani Ma, PAP-ANDREIA is PHCP.                                                          snw 

01:35 Brendon Kerr MD is Attending Physician.                                             snw 

01:41 Triage completed.                                                                       vc1 

01:42 Arm band placed on right wrist.                                                         vc1 

02:00 Patient has correct armband on for positive identification. Bed in low position. Call   ha1 

      light in reach. Side rails up X 1.                                                          

02:27 Brendon Kerr MD is Attending Physician.                                             [facility] 

02:33 Radiology exam delayed due to lab results not completed at this time. (BUN/Creatinine). eh4 

02:52 Chest Single View XRAY In Process Unspecified.                                          EDMS

03:00 No provider procedures requiring assistance completed. Inserted saline lock: 20 gauge   ha1 

      in right antecubital area, using aseptic technique. Blood collected.                        

03:40 US Abdomen Limited In Process Unspecified.                                              EDMS

04:24 Sally Vega RN is Primary Nurse.                                                      ha1 

04:24 CBC with Diff Sent.                                                                     ha1 

04:24 CMP Sent.                                                                               ha1 

04:24 Lipase Sent.                                                                            ha1 

04:55 Ceferino Garcia MD is Hospitalizing Provider.                                           antonino 

05:39 Patient admitted, IV remains in place.                                                  ha1 

05:40 Provided Education on: need for admit .                                                 ha1 

                                                                                                  

Administered Medications:                                                                         

04:12 Drug: NS 0.9% IV 1000 ml Route: IV; Rate: 1 bolus; Site: left antecubital;              ha1 

04:12 Drug: Famotidine IVP 20 mg Route: IVP; Site: left antecubital;                          ha1 

04:45 Follow up: Response: No adverse reaction; Nausea is decreased                           ha1 

04:14 Drug: Ondansetron IVP 4 mg Route: IVP; Site: left antecubital;                          ha1 

04:45 Follow up: Response: No adverse reaction; Nausea is decreased                           ha1 

04:52 Drug: Sucralfate PO 1 grams Route: PO;                                                  ha1 

05:03 Drug: Ondansetron IVP 4 mg Route: IVP; Site: right antecubital;                         ha1 

05:24 Follow up: Response: No adverse reaction                                                ha1 

05:06 Drug: fentaNYL (PF) IVP 50 mcg Route: IVP; Site: right antecubital;                     ha1 

05:24 Follow up: Response: No adverse reaction; Pain is decreased; RASS: Alert and Calm (0)   ha1 

05:09 Drug: Piperacillin-Tazobactam IVPB 3.375 grams Route: IVPB; Infused Over: 60 mins;      ha1 

      Site: right antecubital;                                                                    

                                                                                                  

                                                                                                  

Medication:                                                                                       

05:40 VIS not applicable for this client.                                                     ha1 

                                                                                                  

Outcome:                                                                                          

04:56 Decision to Hospitalize by Provider.                                                    antonino 

05:36 Admitted to Med/surg accompanied by nurse, via wheelchair, with chart, Report called to Ke Castaneda RN                                                                                   

05:36 Condition: stable                                                                           

06:03 Patient left the ED.                                                                    [facility] 

                                                                                                  

Signatures:                                                                                       

Dispatcher [employer]                           Brendon Brady MD MD cha Waters, Shelly, FNP-C                   FNP-Csnw                                                  

Charlene Price                                 mr                                                   

Rukhsana Price, RN                    RN   vc1                                                  

Sally Vega, RN                        RN   ha1                                                  

Félix Stanton                              4                                                  

                                                                                                  

Corrections: (The following items were deleted from the chart)                                    

01:42 01:41 PSHx: None; vc1                                                                   vc1 

                                                                                                  

**************************************************************************************************

## 2023-09-02 NOTE — RAD REPORT
EXAM DESCRIPTION:  CT - Abdomen   Pelvis W Contrast - 9/2/2023 6:18 am

 

CLINICAL HISTORY:

28 years Female ruq to mid abd pain, bloody stools

 

TECHNIQUE:  Axial CT imaging of the abdomen and pelvis was performed following the administration of 
intravenous contrast.. Oral contrast was not administered.   Sagittal and coronal reconstructed image
s were then performed.   The CT study is performed according to ALARA (as low as reasonably achievabl
e) or ALARA/IMAGE GENTLY, with automatic adjustment of mA and/or kV according to patient size.

Performed on: 9/2/2023 at 5:00 AM.

 

COMPARISON:  Abdominal ultrasound performed on 9/2/2023 at 3:25 AM

 

FINDINGS:  Lung bases: The lung bases are clear.

Liver: The liver is normal in size and configuration. No focal hepatic abnormalities are identified. 
Liver attenuation is within normal limits. The portal veins are patent.

Spleen: The spleen is normal in size, configuration and attenuation.

Gallbladder and bile duct:   The gallbladder is well distended and contains a few small gallstones ne
ar the neck of the gallbladder There is no biliary ductal dilatation.

Pancreas: The pancreas is grossly normal in size and configuration.

Adrenal Glands: The adrenal glands are normal in size and configuration.

Kidneys: The kidneys are normal in size and configuration. There is no evidence of hydronephrosis. Th
ere is no evidence of nephrolithiasis. No definite solid or cystic renal mass lesions are identified.


Stomach: The stomach is grossly normal. There is no definite hiatal hernia.

Bowel: The bowel gas pattern is non specific and non obstructive.

Appendix: The appendix is not clearly delineated on this examination. There is no CT evidence to sugg
est acute appendicitis.

Free air: There is no evidence of free air.

Free fluid: There is no evidence of free fluid.

Vasculature: The aorta is normal in caliber and contour. The inferior vena cava is grossly unremarkab
le.

Lymphadenopathy: No pathologic lymphadenopathy is identified.

Bladder: The bladder is well distended and smooth in contour.

Reproductive: The uterus is enlarged and measures approximately 12 cm in craniocaudal dimension. The 
uterus is somewhat bulky in contour which may be related to a myomatous uterus or post gravid uterus.


Bones: No acute osseous abnormalities are identified. There appears to be a transitional lumbar verte
bral segment with partial lumbarization of S1.

Soft tissues: No acute soft tissue abnormalities are identified. There is a fat-containing supraumbil
ical ventral abdominal wall hernia measuring approximately 6.5 x 4.2 x 6.3 cm.

 

IMPRESSION:  1.   No evidence of acute intra-abdominal or intrapelvic pathology. There are no definit
e findings on this examination to explain the patient's reported clinical symptoms.

2.   Cholelithiasis without evidence of biliary ductal dilatation.

3.   Fat-containing supraumbilical ventral abdominal wall hernia.

4.   Enlarged uterus which is somewhat bulky in contour which may be related to a myomatous uterus or
 post gravid uterus.

5.   There appears to be a transitional lumbar vertebral segment with partial lumbarization of S1.

 

Electronically signed by:   Debbie Ochoa DO   9/2/2023 5:33 AM CDT Workstation: [id number]

 

 

Due to temporary technical issues with the PACS/Fluency reporting system, reports are being signed by
 the in house radiologists without review as a courtesy to insure prompt reporting. The interpreting 
radiologist is fully responsible for the content of the report.

## 2023-09-02 NOTE — XMS REPORT
Continuity of Care Document

                          Created on:2023



Patient:MARIELA REDD

Sex:Female

:1995

External Reference #:[id number]





Demographics







                          Address                   [address]  



                                                    [place] [address]

 

                          Home Phone                [phone]

 

                          Work Phone                [phone]

 

                          Mobile Phone              [phone]

 

                          Email Address             [email or web address]

 

                          Preferred Language        English

 

                          Marital Status            Unknown

 

                          Pentecostal Affiliation     Unknown

 

                          Race                      Unknown

 

                          Additional Race(s)        Unavailable

 

                          Ethnic Group              Unknown









Author







                          Organization              [facility]

t

 

                          Address                   [address] [facility] [address] [place] [id number]

 

                          Phone                     [phone]









Support







                Name            Relationship    Address         Phone

 

                CECILIA VALDES               [address]  [place]

noam



                                                [place] [address] 

 

                DEREK ROBLES               [address]  [place]av[address]

able



                                                         



                                                [place] [address] 

 

                Derek Arthur Sibling         [address] [phone]



                                                         



                                                [place] [address] 

 

                Cecilia Deleon Mother          [address]   [phone]





                                                [place] [address] 









Care Team Providers







                    Name                Role                Phone

 

                    PCP, PATIENT DOES NOT HAVE A Primary Care Physician UnavailCHRIS Busch     Attending Clinician Unavailable

 

                    Chris Narvaez DO  Attending Clinician [phone]

 

                    SLOAN NELSON    Attending Clinician Unavailable

 

                    Sloan Nelson MD Attending Clinician [phone]

 

                    PORSCHE POWER     Attending Clinician Unavailable

 

                    PORSCHE Rodriges Attending Clinician [phone]

 

                    Tom Bustillo MD  Attending Clinician [phone]

 

                    Doctor Unassigned, No Name Attending Clinician Unavailable

 

                    CYNTHIA FITCH    Attending Clinician Unavailable

 

                    Cynthia Cardenas Attending Clinician [phone]

 

                    BHARTI FUENTES     Attending Clinician Unavailable

 

                    Bharti Fuentes MD  Attending Clinician [phone]

 

                    Fabienne Steele RN     Attending Clinician Unavailable

 

                    Cristobal Lopez Attending Clinician [phone]

 

                    SABINA AVILEZ   Attending Clinician Unavailable

 

                    Tess EMERSON, Jasmin KERR Attending Clinician [phone]

[phone]

 

                    [facility] Attending Clinician Unavailable

 

                    Prem Batista MD  Attending Clinician [phone]

 

                    Karen EMERSON, Katy Young Attending Clinician [phone]

 

                    Rahul FNP, Crystal R Attending Clinician [phone]

 

                    [facility] Attending Clinician Unavailable

 

                    Marquis WHCNP, Harleen C Attending Clinician [phone]

[phone]

 

                    Wilton EMERSON, Shanell   Attending Clinician [phone]

 

                    Tommy EMERSON, Carlos Eduardo   Attending Clinician [phone]

 

                    CHRIS NARVAEZ     Admitting Clinician Unavailable

 

                    Tom Bustillo MD  Admitting Clinician [phone]

 

                    CYNTHIA FITCH    Admitting Clinician Unavailable

 

                    BHARTI FUENTES     Admitting Clinician Unavailable

 

                    Prem Batista MD  Admitting Clinician [phone]

 

                    Shanell Núñez MD   Admitting Clinician [phone]









Payers







           Payer Name Policy Type Policy Number Effective Date Expiration Date REYES MELENDREZ            [id number]  2019            



           HEALTH                           00:00:00              







Problems







       Condition Condition Condition Status Onset  Resolution Last   Treating Co

mments 

Source



       Name   Details Category        Date   Date   Treatment Clinician        



                                                 Date                 

 

       Request Request Disease Active                       Overview: Univ

ers



       for    for                  8-15                        Formattin ity of



       sterilizat sterilizat               00:00:                      g of this

 [place]



       ion    ion                  00                          note   Medical



                                                               might be Branch



                                                               different 



                                                               from the 



                                                               original. 



                                                               Added  



                                                               automatic 



                                                               ally from 



                                                               request 



                                                               for    



                                                               surgery 



                                                               [id number] 

 

       Postpartum Postpartum Disease Active                              U

nivers



       care and care and               8-14                               ity of



       examinatio examinatio               00:00:                             [place]

[place]



       n      n                    00                                 Medical



       immediatel immediatel                                                  Br

anch



       y after y after                                                  



       delivery delivery                                                  

 

       38 weeks 38 weeks Disease Active                              Unive

rs



       gestation gestation               7-26                               ity 

of



       of     of                   00:00:                             [place]



       pregnancy pregnancy               00                                 Medi

eliza



                                                                      Branch

 

       S/P    S/P    Disease Active                              Univers



                       7-26                               ity of



       section section               00:00:                             [address]



                                                                      Branch

 

       APH    APH    Disease Active                              Univers



       (antepartu (antepartu                                              it

y of



       m      m                    00:00:                             [place]



       hemorrhage hemorrhage               00                                 Me

dical



       ), third ), third                                                  Branch



       trimester trimester                                                  

 

       Decreased Decreased Disease Active                              Uni

vers



       fetal  fetal                                               ity of



       movements, movements,               00:00:                             Te

[place]



       third  third                00                                 Medical



       trimester, trimester,                                                  Br

anch



       not    not                                                     



       applicable applicable                                                  



       or     or                                                      



       unspecifie unspecifie                                                  



       d      d                                                       

 

       Susceptibl Susceptibl Disease Active                              U

nivers



       e to   e to                                                ity of



       Varicella Varicella               00:00:                             [place]

s



       (non-immun (non-immun               00                                 Me

dical



       e),    e),                                                     Branch



       currently currently                                                  



       pregnant pregnant                                                  



       in third in third                                                  



       trimester trimester                                                  

 

       BMI    BMI    Disease Active                              Univers



       39.0-39.9, 39.0-39.9,                                              it

y of



       adult  adult                00:00:                             [place]



                                   00                                 Medical



                                                                      Branch

 

       Limited Limited Disease Active                              Univers



       prenatal prenatal                                              ity of



       care in care in               00:00:                             [place]



       third  third                00                                 Medical



       trimester trimester                                                  Bran

ch

 

       Multiparit Multiparit Disease Active                              U

nivers



       y      y                                                   ity of



                                   00:00:                             [place]



                                   00                                 Medical



                                                                      Branch

 

       Tubal  Tubal  Disease Active                              Univers



       ligation ligation                                              ity of



       status status               00:00:                             [place]



                                   00                                 Medical



                                                                      Branch

 

       Cessation Cessation Disease Active                              Uni

vers



       of tobacco of tobacco                                              it

y of



       use in use in               00:00:                             [place]



       previous previous               00                                 Medica

l



       12 months 12 months                                                  Bran

ch

 

       Tubal  Tubal  Disease Active                              Univers



       ligation ligation                                              ity of



       status status               00:00:                             [place]



                                   00                                 Medical



                                                                      Branch

 

       History of History of Disease Active                              U

nivers



                                                      ity of



       delivery, delivery,               00:00:                             [place]

[place]



       currently currently               00                                 Medi

eliza



       pregnant pregnant                                                  Branch



       in third in third                                                  



       trimester trimester                                                  

 

       Obesity Obesity Disease Active                              Univers



       affecting affecting                                              ity 

of



       pregnancy pregnancy               00:00:                             [place]

[place]



       in third in third               00                                 Medica

l



       trimester trimester                                                  Bran

ch

 

       Anemia of Anemia of Disease Active                              Uni

vers



       mother in mother in               03                               ity 

of



       pregnancy, pregnancy,               00:00:                             Te

[place]



       antepartum antepartum               00                                 Me

dical



                                                                      Branch







Allergies, Adverse Reactions, Alerts







       Allergy Allergy Status Severity Reaction(s) Onset  Inactive Treating Comm

ents 

Source



       Name   Type                        Date   Date   Clinician        

 

       NO KNOWN Drug   Active                                           Univers



       ALLERGIE Class                                                   ity of



       S                                                              [facility]







Social History







           Social Habit Start Date Stop Date  Quantity   Comments   Source

 

           ASSERTION  2018-11-10            Pregnant              University 



                      00:00:00                                    [facility]

 

           Exposure to 2022-04-15 2022 Not sure              University 



           SARS-CoV-2 00:00:00   13:31:00                         [facility]



           (event)                                                Branch

 

           Alcohol intake 2022 Current               [facility]



                      00:00:00   00:00:00   non-drinker of            [place] Medi

eliza



                                            alcohol (finding)            Branch

 

           Tobacco use and 2019 Never used            Universit

y of



           exposure   00:00:00   00:00:00                         [facility]

 

           Education  2019 [address] [place] [address]



                      00:00:00   00:00:00                         [facility]

 

           History of            2018 Cigarette Smoker            Universi

ty of



           tobacco use            00:00:00                         [facility]

 

           Sex Assigned At 1995                       Universit

y of



           Birth      00:00:00   00:00:00                         [facility]









                Smoking Status  Start Date      Stop Date       Source

 

                Former smoker   2019 00:00:00 2019 00:00:00 Universi

ty of [facility]

 

                Unknown if ever smoked                                 Universit

y of [facility]







Medications







       Ordered Filled Start  Stop   Current Ordering Indication Dosage Frequency

 Signature

                    Comments            Components          Source



     Medication Medication Date Date Medication? Clinician                (SIG) 

          



     Name Name                                                   

 

     HYDROcodone      2022- No             1{tbl}      1 tablet,         

  Univers



     -acetaminop      -25                          Oral,           ity of



     hen (NORCO)      00:00: 22:51                          ONCE, 1           Te

xas



      mg      00   :00                           dose, On           Medica

l



     tablet 1                                         Mon            Branch



     tablet                                         22 at           



                                                  1900,           



                                                  Routine           

 

     ketorolac            Yes       [id number] 10mg      Take 1          

 Univers



     10 mg      4-25                9103           tablet by           ity of



     tablet      00:00:                               mouth           [place]



               00                                 every 6           Medical



                                                  (six)           Branch



                                                  hours as           



                                                  needed for           



                                                  Pain           



                                                  (scale           



                                                  7-10).           

 

     metroNIDAZO            Yes       [id number] 500mg      Take 1         

  Univers



      mg      4-25                               tablet by           ity o

f



     tablet      00:00:                               mouth 2           [place]



               00                                 (two)           Medical



                                                  times           Branch



                                                  daily.           

 

     ibuprofen            Yes       [id number] 800mg      Take 1           

Univers



     800 mg      4-13                               tablet by           ity of



     tablet      00:00:                               mouth           [place]



               00                                 every 8           Medical



                                                  (eight)           Branch



                                                  hours as           



                                                  needed for           



                                                  Pain           



                                                  (scale           



                                                  4-6).           

 

     methocarbam            Yes       [id number] 500mg      Take 1         

  Univers



     oL        4-13                               tablet by           ity of



     (ROBAXIN)      00:00:                               mouth           [place]



     500 mg      00                                 every 6           Medical



     tablet                                         (six)           Branch



                                                  hours as           



                                                  needed for           



                                                  Pain           



                                                  (scale           



                                                  7-10)           



                                                  (MUSCLE           



                                                  SPASM).           

 

     methocarbam            Yes       [id number] 500mg      Take 1         

  Univers



     oL        4-13                               tablet by           ity of



     (ROBAXIN)      00:00:                               mouth           [place]



     500 mg      00                                 every 6           Medical



     tablet                                         (six)           Branch



                                                  hours as           



                                                  needed for           



                                                  Pain           



                                                  (scale           



                                                  7-10)           



                                                  (MUSCLE           



                                                  SPASM).           

 

     ibuprofen      2022- No        [id number] 800mg      Take 1          

 Univers



     800 mg      4-13 04-25                          tablet by           ity of



     tablet      00:00: 00:00                          mouth           [place]



               00   :00                           every 8           Medical



                                                  (eight)           Branch



                                                  hours as           



                                                  needed for           



                                                  Pain           



                                                  (scale           



                                                  4-6).           

 

     acetaminoph      2021- No             1000mg      1,000 mg,         

  Univers



     en        -                          Oral,           ity of



     (TYLENOL)      05:30: 04:31                          ONCE, 1           [place]

s



     tablet      00   :00                           dose, On           Medical



     1,000 mg                                         Sat            Branch



                                                  21           



                                                  at 2330,           



                                                  ASAP           

 

     ibuprofen      2021- No             800mg      800 mg,           Uni

vers



     (IBU)      -                          Oral,           ity of



     tablet 800      04:30: 03:32                          ONCE, 1           Julio

as



     mg        00   :00                           dose, On           Medical



                                                  Sat            Branch



                                                  21           



                                                  at 2230,           



                                                  ASAP           

 

     ibuprofen      2021      Yes       [id number] 600mg      Take 1           

Univers



     600 mg      2-25                               tablet by           ity of



     tablet      00:00:                               mouth           [place]



               00                                 every 6           Medical



                                                  (six)           Branch



                                                  hours as           



                                                  needed for           



                                                  Pain           



                                                  (scale           



                                                  4-6).           

 

     benzonatate      2021      Yes       [id number] 100mg      Take 1         

  Univers



     100 mg      2-25                               capsule by           ity of



     capsule      00:00:                               mouth 3           [place]



               00                                 (three)           Medical



                                                  times           Branch



                                                  daily as           



                                                  needed for           



                                                  Cough.           

 

     ondansetron      2021      Yes       [id number] 4mg       Take 1          

 Univers



     (ZOFRAN      2-25                               tablet by           ity of



     ODT) 4 mg      00:00:                               mouth           [place]



     disintegrat      00                                 every 8           Medic

al



     ing tablet                                         (eight)           Branch



                                                  hours as           



                                                  needed for           



                                                  Nausea and           



                                                  Vomiting           



                                                  (N/V).           

 

     ibuprofen      2021      Yes       [id number] 600mg      Take 1           

[facility]



     600 mg      2-25                               tablet by           [facility]



     tablet      00:00:                               mouth           [place]



               00                                 every 6           [facility]



                                                  (six)           [facility]



                                                  hours as           



                                                  needed for           



                                                  Pain           



                                                  (scale           



                                                  4-6).           

 

     benzonatate      2021      Yes       [id number] 100mg      Take 1         

  [facility]



     100 mg      2-25                               capsule by           [facility]



     capsule      00:00:                               mouth 3           [place]



               00                                 (three)           [facility]



                                                  times           [facility]



                                                  daily as           



                                                  needed for           



                                                  Cough.           

 

     ondansetron      2021      Yes       [id number] 4mg       Take 1          

 [facility]



     (ZOFRAN      2-25                               tablet by           [facility] of



     ODT) 4 mg      00:00:                               mouth           [place]



     disintegrat      00                                 every 8           Medic

al



     ing tablet                                         (eight)           [facility]



                                                  hours as           



                                                  needed for           



                                                  Nausea and           



                                                  Vomiting           



                                                  (N/V).           

 

     benzonatate      2021      Yes       [id number] 100mg      Take 1         

  [facility]



     100 mg      2-25                               capsule by           [facility]



     capsule      00:00:                               mouth 3           [place]



               00                                 (three)           [facility]



                                                  times           [facility]



                                                  daily as           



                                                  needed for           



                                                  Cough.           

 

     ondansetron      2021      Yes       [id number] 4mg       Take 1          

 [facility]



     (ZOFRAN      2-25                               tablet by           [facility] of



     ODT) 4 mg      00:00:                               mouth           [place]



     disintegrat      00                                 every 8           Medic

al



     ing tablet                                         (eight)           [facility]



                                                  hours as           



                                                  needed for           



                                                  Nausea and           



                                                  Vomiting           



                                                  (N/V).           

 

     ibuprofen      2021- No        [id number] 600mg      Take 1          

 [facility]



     600 mg      2-25 04-25                          tablet by           ity of



     tablet      00:00: 00:00                          mouth           [place]



               00   :00                           every 6           [facility]



                                                  (six)           [facility]



                                                  hours as           



                                                  needed for           



                                                  Pain           



                                                  (scale           



                                                  4-6).           

 

     fluconazole      2021- No        [id number] 150mg      Take 1          

 [facility]



     150 mg      -                          tablet by           [facility]



     tablet      00:00: 05:59                          mouth once           [place]

[facility]



               00   :00                           now for 1           [facility]



                                                  dose.           [facility]

 

     ibuprofen      2021- No             400mg      400 mg,           [facility]

[facility]



     (IBU)                                Oral,           [facility]



     tablet 400      09:15: 08:38                          ONCE, 1           [facility]

[facility]



     mg        00   :00                           dose, On           [facility]



                                                  Sat            [facility]



                                                  21           



                                                  at 0315,           



                                                  Routine           

 

     lidocaine-e      2021- No             10mL      10 mL,           [facility]

vers



     pinephrine                                Intraderma           it

y of



     (XYLOCAINE      08:15: 08:38                          l, ONCE, 1           

[place]



     WITH      00   :00                           dose, On           Medical



     EPINEPHRINE                                         Sat            [facility]



     ) 21           



     %-1:100,000                                         at 0215,           



     injection                                         Routine           



     10 mL                                                        

 

     maalox:diph      2021- No             15mL      15 mL,           [facility]

vers



     enhydrAMINE                                Oral,           ity of



     :lidocaine      04:30: 03:40                          ONCE, 1           [facility]

[facility]



     2 % viscous      00   :00                           dose, On           Medi

[facility]



     1:1:1                                         Fri            [facility]



     (FIRST-MOUT                                         21           



     [facility]ASH BLM)                                         at 2230,           



     oral                                         Routine           



     suspension                                                        



     15 mL                                                        

 

     dexamethaso      2021- No             10mg      10 mg, IV           

[facility]



     ne                                  Push,           [facility] of



     (DECADRON      04:30: 03:39                          ONCE, 1           [place]

[facility]



     PHOSPHATE)      00   :00                           dose, On           Medic

al



     injection                                         Fri            [facility]



     10 mg                                         21           



                                                  at 2230,           



                                                  STAT           

 

     iopamidol      2021- No        [id number] 100mL      100 mL,         

  [facility]



     (ISOVUE                                Intravenou           ity o

f



     370-500 mL)      03:50: 03:51                          s, ONCE, 1          

 [place]



     injection      00   :00                           dose, On           [facility]

l



     100 mL                                         Fri            [facility]



                                                  21           



                                                  at 2200,           



                                                  Routine           

 

     methylPREDN      2021      Yes       [id number]           Take by          

 [facility]



     ISolone 4                                     mouth           [facility]



     mg tablets      00:00:                               SEE-INSTRU           [facility]

[place]



               00                                 CTIONS.           [facility]



                                                  follow           [facility]



                                                  package           



                                                  directions           

 

     methylPREDN      2021      Yes       [id number]           Take by          

 [facility]



     ISolone 4                                     mouth           [facility]



     mg tablets      00:00:                               SEE-INSTRU           [facility]

[place]



               00                                 CTIONS.           [facility]



                                                  follow           [facility]



                                                  package           



                                                  directions           

 

     methylPREDN      2021      Yes       [id number]           Take by          

 [facility]



     ISolone 4                                     mouth           [facility]



     mg tablets      00:00:                               SEE-INSTRU           [facility]

[place]



               00                                 CTIONS.           [facility]



                                                  follow           [facility]



                                                  package           



                                                  directions           

 

     methylPREDN      2021      Yes       [id number]           Take by          

 [facility]



     ISolone 4                                     mouth           [facility]



     mg tablets      00:00:                               SEE-INSTRU           T

[facility]



               00                                 CTIONS.           [facility]



                                                  follow           [facility]



                                                  package           



                                                  directions           

 

     methylPREDN      2021      Yes       [id number]           Take by          

 [facility]



     ISolone 4      -27                               mouth           [facility]



     mg tablets      00:00:                               SEE-INSTRU           T

[facility]



               00                                 CTIONS.           [facility]



                                                  follow           [facility]



                                                  package           



                                                  directions           

 

     methylPREDN      2021      Yes       [id number]           Take by          

 [facility]



     ISolone 4      -27                               mouth           [facility]



     mg tablets      00:00:                               SEE-INSTRU           [facility]

[facility]



               00                                 CTIONS.           [facility]



                                                  follow           [facility]



                                                  package           



                                                  directions           

 

     amoxicillin      2021- No        [id number] 1{tbl}      Take 1        

   [facility]



     -clavulanat       12-08                          tablet by           it

y of



     e         00:00: 05:59                          mouth 2           [place]



     (AUGMENTIN)      00   :00                           (two)           Medical



     875-125 mg                                         times           [facility]



     per tablet                                         daily for           



                                                  10 days.           

 

     amoxicillin      2021- No        [id number] 1{tbl}      Take 1        

   [facility]



     -clavulanat       12-08                          tablet by           it

y of



     e         00:00: 05:59                          mouth 2           [place]



     (AUGMENTIN)      00   :00                           (two)           Medical



     875-125 mg                                         times           [facility]



     per tablet                                         daily for           



                                                  10 days.           

 

     amoxicillin      2021- No        [id number] 1{tbl}      Take 1        

   [facility]



     -clavulanat       12-08                          tablet by           it

y of



     e         00:00: 05:59                          mouth 2           [place]



     (AUGMENTIN)      00   :00                           (two)           Medical



     875-125 mg                                         times           [facility]



     per tablet                                         daily for           



                                                  10 days.           

 

     lidocaine      2021- No             15mL      15 mL,           [facility]

rs



     2% viscous                                Oral,           ity of



     (LIDOCAINE      18:45: 17:46                          ONCE, 1           [facility]

[facility]



     VISCOUS) 2      00   :00                           dose, On           Medic

al



     % solution                                         Wed            [facility]



     15 mL                                         21           



                                                  at 1245,           



                                                  ASAP           

 

     dexamethaso      2021- No             10mg      10 mg,           [facility]

vers



     ne                                  Oral,           [facility]



     (DECADRON      18:45: 17:46                          ONCE, 1           [place]

[facility]



     PHOSPHATE)      00   :00                           dose, On           Medic

al



     injection                                         Wed            [facility]



     10 mg                                         21           



                                                  at 1245,           



                                                  Routine           

 

     penicillin      2021- No             1.210      1.2            [facility]

rs



     g                                   [place]           [facility]



     benzathine      18:45: 17:45                          Units,           [place]

[facility]



     (BICILLIN      00   :00                           Intramuscu           Medi

[facility]



     L-A)                                         lar, ONCE,           [facility]



     injection                                         1 dose, On           



     1.2 Million                                         Wed            



     Units                                         21           



                                                  at 1245,           



                                                  ASAP<br>Re           



                                                  ason for           



                                                  Anti-Infec           



                                                  tive:           



                                                  Empiric           



                                                  Therapy           



                                                  for            



                                                  Suspected           



                                                  Infection<           



                                                  br>Empiric           



                                                  Therapy           



                                                  Site:           



                                                  HEENT<br>D           



                                                  uration of           



                                                  therapy:           



                                                  72 hours           

 

     iopamidol      2021- No        [id number] 100mL      100 mL,          

 [facility]



     (ISOVUE      0-29 10-                          Intravenou           ity o

f



     370-500 mL)      19:00: 17:40                          s, ONCE, 1          

 [place]



     injection      00   :00                           dose, On           [facility]

l



     100 mL                                         Fri            [facility]



                                                  10/29/21           



                                                  at 1400,           



                                                  Routine           

 

     FENTanyl PF      2021- No             100ug      100 mcg,           

[facility]



     (SUBLIMAZE      0-29 10-                          Slow IV           ity o

f



     (PF))      18:30: 17:52                          Push,           [place]



     injection      00   :00                           ONCE, 1           Medical



     100 mcg                                         dose, On           [facility]



                                                  Fri            



                                                  10/29/21           



                                                  at 1330,           



                                                  STAT           

 

     ibuprofen      2021      Yes       [id number] 600mg      Take 1           

[facility]



     600 mg      0-29                               tablet by           [facility]



     tablet      00:00:                               mouth           [place]



               00                                 every 8           [facility]



                                                  (eight)           [facility]



                                                  hours as           



                                                  needed for           



                                                  Pain           



                                                  (scale           



                                                  4-6).           

 

     dicyclomine      2021      Yes       [id number] 20mg      Take 1          

 [facility]



     20 mg      0-29                               tablet by           [facility]



     tablet      00:00:                               mouth 4           [place]



               00                                 (four)           [facility]



                                                  times           [facility]



                                                  daily.           

 

     ibuprofen      2021      Yes       [id number] 600mg      Take 1           

[facility]



     600 mg      0-29                               tablet by           [facility]



     tablet      00:00:                               mouth           [place]



               00                                 every 8           [facility]



                                                  (eight)           [facility]



                                                  hours as           



                                                  needed for           



                                                  Pain           



                                                  (scale           



                                                  4-6).           

 

     dicyclomine      2021      Yes       [id number] 20mg      Take 1          

 [facility]



     20 mg      0-29                               tablet by           [facility]



     tablet      00:00:                               mouth 4           [place]



               00                                 (four)           [facility]



                                                  times           [facility]



                                                  daily.           

 

     ibuprofen      2021      Yes       [id number] 600mg      Take 1           

[facility]



     600 mg      0-29                               tablet by           ity of



     tablet      00:00:                               mouth           [place]



               00                                 every 8           [facility]



                                                  (eight)           [facility]



                                                  hours as           



                                                  needed for           



                                                  Pain           



                                                  (scale           



                                                  4-6).           

 

     dicyclomine      2021      Yes       [id number] 20mg      Take 1          

 [facility]



     20 mg      0-29                               tablet by           [facility]



     tablet      00:00:                               mouth 4           [place]



               00                                 (four)           [facility]



                                                  times           [facility]



                                                  daily.           

 

     ibuprofen      2021      Yes       [id number] 600mg      Take 1           

[facility]



     600 mg      0-29                               tablet by           ity of



     tablet      00:00:                               mouth           [place]



               00                                 every 8           [facility]



                                                  (eight)           [facility]



                                                  hours as           



                                                  needed for           



                                                  Pain           



                                                  (scale           



                                                  4-6).           

 

     dicyclomine      2021      Yes       [id number] 20mg      Take 1          

 [facility]



     20 mg      0-29                               tablet by           [facility]



     tablet      00:00:                               mouth 4           [place]



               00                                 (four)           [facility]



                                                  times           [facility]



                                                  daily.           

 

     ibuprofen      2021      Yes       [id number] 600mg      Take 1           

[facility]



     600 mg      0-29                               tablet by           ity of



     tablet      00:00:                               mouth           [place]



               00                                 every 8           [facility]



                                                  (eight)           [facility]



                                                  hours as           



                                                  needed for           



                                                  Pain           



                                                  (scale           



                                                  4-6).           

 

     dicyclomine      2021      Yes       [id number] 20mg      Take 1          

 [facility]



     20 mg      0-29                               tablet by           [facility]



     tablet      00:00:                               mouth 4           [place]



               00                                 (four)           [facility]



                                                  times           [facility]



                                                  daily.           

 

     ibuprofen      2021      Yes       [id number] 600mg      Take 1           

[facility]



     600 mg      0-29                               tablet by           ity of



     tablet      00:00:                               mouth           [place]



               00                                 every 8           Medical



                                                  (eight)           [facility]



                                                  hours as           



                                                  needed for           



                                                  Pain           



                                                  (scale           



                                                  4-6).           

 

     dicyclomine      2021      Yes       [id number] 20mg      Take 1          

 [facility]



     20 mg      0-29                               tablet by           [facility]



     tablet      00:00:                               mouth 4           [place]



               00                                 (four)           [facility]



                                                  times           [facility]



                                                  daily.           

 

     ibuprofen      2021      Yes       [id number] 600mg      Take 1           

[facility]



     600 mg      0-29                               tablet by           [facility]



     tablet      00:00:                               mouth           [place]



               00                                 every 8           [facility]



                                                  (eight)           [facility]



                                                  hours as           



                                                  needed for           



                                                  Pain           



                                                  (scale           



                                                  4-6).           

 

     dicyclomine      2021      Yes       [id number] 20mg      Take 1          

 [facility]



     20 mg      0-29                               tablet by           [facility]



     tablet      00:00:                               mouth 4           [place]



               00                                 (four)           [facility]



                                                  times           [facility]



                                                  daily.           

 

     ibuprofen      2021      Yes       [id number] 600mg      Take 1           

[facility]



     600 mg      0-29                               tablet by           [facility]



     tablet      00:00:                               mouth           [place]



               00                                 every 8           [facility]



                                                  (eight)           [facility]



                                                  hours as           



                                                  needed for           



                                                  Pain           



                                                  (scale           



                                                  4-6).           

 

     dicyclomine      2021      Yes       [id number] 20mg      Take 1          

 [facility]



     20 mg      0-29                               tablet by           [facility]



     tablet      00:00:                               mouth 4           [place]



               00                                 (four)           [facility]



                                                  times           [facility]



                                                  daily.           

 

     ibuprofen      2021      Yes       [id number] 600mg      Take 1           

[facility]



     600 mg      0-29                               tablet by           [facility]



     tablet      00:00:                               mouth           [place]



               00                                 every 8           [facility]



                                                  (eight)           [facility]



                                                  hours as           



                                                  needed for           



                                                  Pain           



                                                  (scale           



                                                  4-6).           

 

     dicyclomine      2021      Yes       [id number] 20mg      Take 1          

 [facility]



     20 mg      0-29                               tablet by           [facility]



     tablet      00:00:                               mouth 4           [place]



               00                                 (four)           [facility]



                                                  times           [facility]



                                                  daily.           

 

     dicyclomine      2021      Yes       [id number] 20mg      Take 1          

 [facility]



     20 mg      0-29                               tablet by           [facility]



     tablet      00:00:                               mouth 4           [place]



               00                                 (four)           [facility]



                                                  times           [facility]



                                                  daily.           

 

     ibuprofen      2021- No        [id number] 600mg      Take 1          

 [facility]



     600 mg      0-29 04-25                          tablet by           [facility]



     tablet      00:00: 00:00                          mouth           [place]



               00   :00                           every 8           [facility]



                                                  (eight)           [facility]



                                                  hours as           



                                                  needed for           



                                                  Pain           



                                                  (scale           



                                                  4-6).           

 

     ibuprofen      2020- No             800mg      800 mg,           [facility]

[facility]



     (IBU)                                Oral,           [facility]



     tablet 800      00:15: 23:30                          ONCE, 1           [facility]

[facility]



     mg        00   :00                           dose, Sat           [facility]



                                                  20           [facility]



                                                  at 1815,           



                                                  ASAP           

 

     lidocaine      2020- No             10mL      10 mL,           [facility]

rs



     2% viscous                                Oral,           [facility]



     (LIDOCAINE      00:15: 23:30                          ONCE, 1           [facility]

[facility]



     VISCOUS) 2      00   :00                           dose, Sat           [facility]

[facility]



     % solution                                         20           [facility]

h



     10 mL                                         at 1815,           



                                                  ASAP           

 

     dexamethaso      2020- No             10mg      10 mg,           [facility]

vers



     ne                                  Intramuscu           [facility] of



     (DECADRON      00:15: 23:30                          lar, ONCE,           [facility]

[facility]



     PHOSPHATE)      00   :00                           1 dose,           [facility]

l



     injection                                         Sat            [facility]



     10 mg                                         20           



                                                  at 1815,           



                                                  STAT           

 

     No known      2020      No                                      [facility]



     medications                                                    [facility]



               18:14:                                              [place]



               13                                                [facility]



                                                                 [facility]

 

     cephALEXin      2020 2020- No        [id number] 500mg      Take 1          

 [facility]



     500 mg      02                          tablet by           [facility]



     tablet      00:00: 05:59                          mouth 2           [place]



               00   :00                           (two)           [facility]



                                                  times           [facility]



                                                  daily for           



                                                  10 days.           

 

     cephALEXin      2020 2020- No        [id number] 500mg      Take 1          

 [facility]



     500 mg       1202                          tablet by           [facility]



     tablet      00:00: 05:59                          mouth 2           [place]



               00   :00                           (two)           [facility]



                                                  times           [facility]



                                                  daily for           



                                                  10 days.           

 

     lactated      2019-0      Yes            1000mL      at 75           [facility]

s



     ringers IV      9-05                               mL/hr,           [facility]



     infusion      14:15:                               1,000 mL,           [place]

[facility]



     1,000 mL      00                                 IV             [facility]



                                                  Infusion,           [facility]



                                                  CONTINUOUS           



                                                  , Starting           



                                                  Thu 19           



                                                  at 0915,           



                                                  Until           



                                                  Discontinu           



                                                  ed,            



                                                  Routine,           



                                                  PACU           

 

     HYDROcodone      -0      Yes            1{tbl}      1 tablet,          

 [facility]



     -acetaminop      9-05                               Oral, PRN,           it

y of



     hen (NORCO      14:13:                               1 dose,           [place]

[facility]



     5) 5-325 mg      31                                 Starting           Medi

[facility]



     tablet 1                                         Thu 19           [facility]

h



     tablet                                         at 0913,           



                                                  Until           



                                                  Discontinu           



                                                  ed,            



                                                  Routine,           



                                                  Pain           



                                                  (scale           



                                                  7-10), DSU           



                                                  Recovery           

 

     ibuprofen      -0      Yes            800mg      800 mg,           [facility]

ers



     (IBU)      9-05                               Oral, PRN,           [facility]



     tablet 800      14:13:                               1 dose,           [place]

[facility]



     mg        31                                 Starting           Medical



                                                  Thu 19           [facility]



                                                  at 0913,           



                                                  Until           



                                                  Discontinu           



                                                  ed,            



                                                  Routine,           



                                                  Pain           



                                                  (scale           



                                                  1-3), Pain           



                                                  (scale           



                                                  4-6), DSU           



                                                  Recovery           

 

     ibuprofen      -0      Yes            800mg      800 mg,           [facility]

ers



     (IBU)      9-05                               Oral, PRN,           [facility]



     tablet 800      14:13:                               1 dose,           [place]

[facility]



     mg        31                                 Starting           Medical



                                                  Thu 19           [facility]



                                                  at 0913,           



                                                  Until           



                                                  Discontinu           



                                                  ed,            



                                                  Routine,           



                                                  Pain           



                                                  (scale           



                                                  1-3), DSU           



                                                  Recovery           

 

     HYDROmorpho      -0      Yes            .2mg      0.2 mg,           [facility]

vers



     ne        9-05                               Slow IV           [facility]



     (DILAUDID)      14:13:                               Push,           [place]



     injection      20                                 Q5MIN PRN,           Medi

eliza



     0.2 mg                                         10 doses,           [facility]



                                                  Starting           



                                                  Thu 19           



                                                  at 0913,           



                                                  Until           



                                                  Discontinu           



                                                  ed,            



                                                  Routine,           



                                                  Pain           



                                                  (scale           



                                                  7-10),           



                                                  PACU<br>Us           



                                                  e approved           



                                                  by             



                                                  (Faculty):           



                                                  [facility],           



                                                  PAIN           



                                                  SERVICE           

 

     FENTanyl PF      2019-0      Yes            25ug      25 mcg,           [facility]

[facility]



     (SUBLIMAZE      -05                               Slow IV           [facility]



     (PF))      14:13:                               Push,           [place]



     injection      20                                 Q5MIN PRN,           Medi

eliza



     25 mcg                                         4 doses,           [facility]



                                                  Starting           



                                                  Thu 19           



                                                  at 0913,           



                                                  Until           



                                                  Discontinu           



                                                  ed,            



                                                  Routine,           



                                                  Pain           



                                                  (scale           



                                                  4-6), PACU           

 

     ondansetron      2019-0      Yes            4mg       4 mg, Slow           

[facility]



     (ZOFRAN      9-05                               IV Push,           [facility]



     (PF))      14:13:                               PRN, 1           [place]



     injection 4      20                                 dose,           Medical



     mg                                           Starting           [facility]



                                                  Thu 19           



                                                  at 0913,           



                                                  Until           



                                                  Discontinu           



                                                  ed,            



                                                  Routine,           



                                                  Nausea and           



                                                  Vomiting           



                                                  (N/V),           



                                                  PACU           

 

     bupivacaine      2019-0      Yes                      PRN,           [facility]

s



     (preserv      9-05                               Starting           [facility] of



     free)      13:56:                               Thu 19           [place]



     (SENSORCAIN      00                                 at 0856,           Medi

[facility]



     E MPF) 0.25                                         Until           [facility]



     % (2.5                                         Discontinu           



     mg/mL)                                         ed,            



     injection                                         Routine,           



                                                  Intra-op           

 

     ibuprofen      -      Yes       [id number] 800mg      Take 1           

[facility]



     800 mg      9-05                               tablet by           [facility]



     tablet      00:00:                               mouth           [place]



               00                                 every 6           [facility]



                                                  (six)           [facility]



                                                  hours as           



                                                  needed for           



                                                  Pain           



                                                  (scale           



                                                  4-6).           

 

     HYDROcodone      -      Yes       [id number] 1{tbl}      Take 1      

     [facility]



     -acetaminop      9-05                108            tablet by           [facility]

 of



     hen 5-325      00:00:                               mouth           [place]



     mg tablet      00                                 every 6           [facility]



                                                  (six)           [facility]



                                                  hours as           



                                                  needed for           



                                                  Pain           



                                                  (scale           



                                                  7-10).           

 

     ibuprofen       2020- No        [id number] 800mg      Take 1          

 [facility]



     800 mg      9-05 04-28                          tablet by           [facility] of



     tablet      00:00: 00:00                          mouth           [place]



               00   :00                           every 6           [facility]



                                                  (six)           [facility]



                                                  hours as           



                                                  needed for           



                                                  Pain           



                                                  (scale           



                                                  4-6).           

 

     HYDROcodone       2020- No        [id number] 1{tbl}      Take 1       

    [facility]



     -acetaminop      9-05 04-28                          tablet by           it

y of



     hen 5-325      00:00: 00:00                          mouth           [place]



     mg tablet      00   :00                           every 6           [facility]



                                                  (six)           [facility]



                                                  hours as           



                                                  needed for           



                                                  Pain           



                                                  (scale           



                                                  7-10).           

 

     ibuprofen      -      Yes       [id number] 800mg      Take 1         

  [facility]



     800 mg      8-15                108            tablet by           [facility]



     tablet      00:00:                               mouth           [place]



               00                                 every 6           [facility]



                                                  (six)           [facility]



                                                  hours as           



                                                  needed for           



                                                  Pain           



                                                  (scale           



                                                  4-6).           

 

     HYDROcodone      -0      Yes       [id number] 1{tbl}      Take 1      

     [facility]



     -acetaminop      8-15                108            tablet by           [facility]

 of



     hen 5-325      00:00:                               mouth           [place]



     mg tablet      00                                 every 6           [facility]



                                                  (six)           [facility]



                                                  hours as           



                                                  needed for           



                                                  Pain           



                                                  (scale           



                                                  7-10).           

 

     ibuprofen      -      Yes       [id number] 800mg      Take 1         

  [facility]



     800 mg      8-15                108            tablet by           [facility] of



     tablet      00:00:                               mouth           [place]



               00                                 every 6           [facility]



                                                  (six)           [facility]



                                                  hours as           



                                                  needed for           



                                                  Pain           



                                                  (scale           



                                                  4-6).           

 

     HYDROcodone            Yes       [id number] 1{tbl}      Take 1      

     [facility]



     -acetaminop      8-15                108            tablet by           [facility]

 of



     hen 5-325      00:00:                               mouth           [place]



     mg tablet      00                                 every 6           [facility]



                                                  (six)           [facility]



                                                  hours as           



                                                  needed for           



                                                  Pain           



                                                  (scale           



                                                  7-10).           

 

     ibuprofen            Yes       [id number] 800mg      Take 1         

  [facility]



     800 mg      8-15                108            tablet by           ity of



     tablet      00:00:                               mouth           [place]



               00                                 every 6           Medical



                                                  (six)           Branch



                                                  hours as           



                                                  needed for           



                                                  Pain           



                                                  (scale           



                                                  4-6).           

 

     HYDROcodone            Yes       [id number] 1{tbl}      Take 1      

     [facility]



     -acetaminop      8-15                108            tablet by           [facility]

 of



     hen 5-325      00:00:                               mouth           [place]



     mg tablet      00                                 every 6           Medical



                                                  (six)           Branch



                                                  hours as           



                                                  needed for           



                                                  Pain           



                                                  (scale           



                                                  7-10).           

 

     ibuprofen       2019- No        [id number] 800mg      Take 1        

   [facility]



     800 mg      8-15 09-05           108            tablet by           [facility] of



     tablet      00:00: 00:00                          mouth           [place]



               00   :00                           every 6           Medical



                                                  (six)           Branch



                                                  hours as           



                                                  needed for           



                                                  Pain           



                                                  (scale           



                                                  4-6).           

 

     HYDROcodone       2019- No        [id number] 1{tbl}      Take 1     

      [facility]



     -acetaminop      8-15 09-05           108            tablet by           it

y of



     hen 5-325      00:00: 00:00                          mouth           [place]



     mg tablet      00   :00                           every 6           Medical



                                                  (six)           Branch



                                                  hours as           



                                                  needed for           



                                                  Pain           



                                                  (scale           



                                                  7-10).           

 

     docusate            Yes       [id number] 240mg      Take 1           [facility]

[facility]



     calcium 240      7-26                               capsule by           it

y of



     mg capsule      00:00:                               mouth once           [facility]

[facility]



               00                                 daily as           [facility]



                                                  needed for           [facility]anch



                                                  Constipati           



                                                  on. May           



                                                  substitute           



                                                  for what           



                                                  is in           



                                                  stock and           



                                                  covered by           



                                                  patient           



                                                  plan           

 

     ferrous      -      Yes       [id number] 325mg      Take 1           [facility]

[facility]



     sulfate 325      7-26                               tablet by           [facility]

 of



     mg (65 mg      00:00:                               mouth 3           [place]



     iron)      00                                 (three)           Medical



     tablet                                         times           Branch



                                                  daily with           



                                                  meals. May           



                                                  substitute           



                                                  for what           



                                                  is in           



                                                  stock and           



                                                  covered by           



                                                  patient           



                                                  plan           

 

     ibuprofen            Yes       [id number] 600mg      Take 1           

[facility]



     600 mg      7-26                               tablet by           [facility]



     tablet      00:00:                               mouth           [place]



               00                                 every 6           Medical



                                                  (six)           Branch



                                                  hours as           



                                                  needed for           



                                                  Pain           



                                                  (scale           



                                                  1-3) or           



                                                  Pain           



                                                  (scale           



                                                  4-6)           



                                                  (Pain).           



                                                  Take with           



                                                  food or           



                                                  milk.           

 

     foLIC acid            Yes       [id number] 1mg       Take 1           

[facility]



     1 mg tablet      7-26                               tablet by           [facility]

 of



               00:00:                               mouth           [place]



               00                                 daily.           Medical



                                                                 Branch

 

     ascorbic            Yes       [id number] 500mg      Take 1           [facility]

[facility]



     acid,      7-26                               tablet by           [facility]



     vitamin C,      00:00:                               mouth 3           [place]

[facility]



     (VITAMIN C)      00                                 (three)           [facility]ic

al



     500 mg                                         times           [facility]



     tablet                                         daily.           

 

     HYDROcodone            Yes       [id number] 1{tbl}      Take 1        

   [facility]



     -acetaminop      7-26                               tablet by           [facility]

 of



     hen 5-325      00:00:                               mouth           [place]



     mg tablet      00                                 every 6           Medical



                                                  (six)           Branch



                                                  hours as           



                                                  needed for           



                                                  Pain           



                                                  (scale           



                                                  7-10).           

 

     docusate            Yes       [id number] 240mg      Take 1           [facility]

[facility]



     calcium 240      7-26                               capsule by           it

y of



     mg capsule      00:00:                               mouth once           [facility]

[facility]



               00                                 daily as           [facility]



                                                  needed for           [facility]



                                                  Constipati           



                                                  on. May           



                                                  substitute           



                                                  for what           



                                                  is in           



                                                  stock and           



                                                  covered by           



                                                  patient           



                                                  plan           

 

     ferrous            Yes       [id number] 325mg      Take 1           [facility]

[facility]



     sulfate 325      7-26                               tablet by           [facility]

 of



     mg (65 mg      00:00:                               mouth 3           [place]



     iron)      00                                 (three)           Medical



     tablet                                         times           [facility]



                                                  daily with           



                                                  meals. May           



                                                  substitute           



                                                  for what           



                                                  is in           



                                                  stock and           



                                                  covered by           



                                                  patient           



                                                  plan           

 

     ibuprofen            Yes       [id number] 600mg      Take 1           

[facility]



     600 mg      7-26                               tablet by           [facility]



     tablet      00:00:                               mouth           [place]



               00                                 every 6           Medical



                                                  (six)           Branch



                                                  hours as           



                                                  needed for           



                                                  Pain           



                                                  (scale           



                                                  1-3) or           



                                                  Pain           



                                                  (scale           



                                                  4-6)           



                                                  (Pain).           



                                                  Take with           



                                                  food or           



                                                  milk.           

 

     foLIC acid            Yes       [id number] 1mg       Take 1           

[facility]



     1 mg tablet      7-26                               tablet by           [facility]

 of



               00:00:                               mouth           [place]



               00                                 daily.           Medical



                                                                 [facility]anch

 

     ascorbic            Yes       [id number] 500mg      Take 1           [facility]

[facility]



     acid,      7-26                               tablet by           [facility]



     vitamin C,      00:00:                               mouth 3           [place]

[facility]



     (VITAMIN C)      00                                 (three)           Medic

al



     500 mg                                         times           [facility]



     tablet                                         daily.           

 

     HYDROcodone            Yes       [id number] 1{tbl}      Take 1        

   [facility]



     -acetaminop      7-26                               tablet by           [facility]

 of



     hen 5-325      00:00:                               mouth           [place]



     mg tablet      00                                 every 6           Medical



                                                  (six)           [facility]anch



                                                  hours as           



                                                  needed for           



                                                  Pain           



                                                  (scale           



                                                  7-10).           

 

     docusate            Yes       [id number] 240mg      Take 1           [facility]

[facility]



     calcium 240      7-26                               capsule by           it

y of



     mg capsule      00:00:                               mouth once           [facility]

[facility]



               00                                 daily as           [facility]



                                                  needed for           [facility]



                                                  Constipati           



                                                  on. May           



                                                  substitute           



                                                  for what           



                                                  is in           



                                                  stock and           



                                                  covered by           



                                                  patient           



                                                  plan           

 

     ferrous            Yes       [id number] 325mg      Take 1           [facility]

[facility]



     sulfate 325      7-26                               tablet by           [facility]

 of



     mg (65 mg      00:00:                               mouth 3           [place]



     iron)      00                                 (three)           Medical



     tablet                                         times           [facility]



                                                  daily with           



                                                  meals. May           



                                                  substitute           



                                                  for what           



                                                  is in           



                                                  stock and           



                                                  covered by           



                                                  patient           



                                                  plan           

 

     ibuprofen            Yes       [id number] 600mg      Take 1           

[facility]



     600 mg      7-26                               tablet by           [facility]



     tablet      00:00:                               mouth           [place]



               00                                 every 6           Medical



                                                  (six)           [facility]anch



                                                  hours as           



                                                  needed for           



                                                  Pain           



                                                  (scale           



                                                  1-3) or           



                                                  Pain           



                                                  (scale           



                                                  4-6)           



                                                  (Pain).           



                                                  Take with           



                                                  food or           



                                                  milk.           

 

     foLIC acid            Yes       [id number] 1mg       Take 1           

[facility]



     1 mg tablet      7-26                               tablet by           [facility]

 of



               00:00:                               mouth           [place]



               00                                 daily.           Medical



                                                                 [facility]anch

 

     ascorbic            Yes       [id number] 500mg      Take 1           [facility]

[facility]



     acid,      7-26                               tablet by           [facility]



     vitamin C,      00:00:                               mouth 3           [place]

[facility]



     (VITAMIN C)      00                                 (three)           Medic

al



     500 mg                                         times           [facility]



     tablet                                         daily.           

 

     HYDROcodone            Yes       [id number] 1{tbl}      Take 1        

   [facility]



     -acetaminop      7-26                               tablet by           [facility]

 of



     hen 5-325      00:00:                               mouth           [place]



     mg tablet      00                                 every 6           Medical



                                                  (six)           [facility]anch



                                                  hours as           



                                                  needed for           



                                                  Pain           



                                                  (scale           



                                                  7-10).           

 

     docusate            Yes       [id number] 240mg      Take 1           [facility]

[facility]



     calcium 240      7-26                               capsule by           it

y of



     mg capsule      00:00:                               mouth once           [facility]

[facility]



               00                                 daily as           [facility]



                                                  needed for           [facility]



                                                  Constipati           



                                                  on. May           



                                                  substitute           



                                                  for what           



                                                  is in           



                                                  stock and           



                                                  covered by           



                                                  patient           



                                                  plan           

 

     ferrous      -      Yes       [id number] 325mg      Take 1           [facility]

[facility]



     sulfate 325      7-26                               tablet by           [facility]

 of



     mg (65 mg      00:00:                               mouth 3           [place]



     iron)      00                                 (three)           Medical



     tablet                                         times           Br[facility]



                                                  daily with           



                                                  meals. May           



                                                  substitute           



                                                  for what           



                                                  is in           



                                                  stock and           



                                                  covered by           



                                                  patient           



                                                  plan           

 

     ibuprofen      -      Yes       [id number] 600mg      Take 1           

[facility]



     600 mg      7-26                               tablet by           [facility]



     tablet      00:00:                               mouth           [place]



               00                                 every 6           Medical



                                                  (six)           [facility]



                                                  hours as           



                                                  needed for           



                                                  Pain           



                                                  (scale           



                                                  1-3) or           



                                                  Pain           



                                                  (scale           



                                                  4-6)           



                                                  (Pain).           



                                                  Take with           



                                                  food or           



                                                  milk.           

 

     foLIC acid      -      Yes       [id number] 1mg       Take 1           

[facility]



     1 mg tablet      7-26                               tablet by           [facility]

 of



               00:00:                               mouth           [place]



               00                                 daily.           Medical



                                                                 [facility]anch

 

     ascorbic            Yes       [id number] 500mg      Take 1           [facility]

[facility]



     acid,      7-26                               tablet by           [facility]



     vitamin C,      00:00:                               mouth 3           [place]

[facility]



     (VITAMIN C)      00                                 (three)           Med[facility]

al



     500 mg                                         times           [facility]



     tablet                                         daily.           

 

     HYDROcodone            Yes       [id number] 1{tbl}      Take 1        

   [facility]



     -acetaminop      7-26                               tablet by           [facility]

 of



     hen 5-325      00:00:                               mouth           [place]



     mg tablet      00                                 every 6           Medical



                                                  (six)           [facility]



                                                  hours as           



                                                  needed for           



                                                  Pain           



                                                  (scale           



                                                  7-10).           

 

     ascorbic            Yes       [id number] 500mg      Take 1           [facility]

[facility]



     acid,      7-26                               tablet by           [facility]



     vitamin C,      00:00:                               mouth 3           [place]

[facility]



     (VITAMIN C)      00                                 (three)           Medic

al



     500 mg                                         times           [facility]



     tablet                                         daily.           

 

     ascorbic            Yes       [id number] 500mg      Take 1           [facility]

[facility]



     acid,      7-26                               tablet by           [facility]



     vitamin C,      00:00:                               mouth 3           [place]

[facility]



     (VITAMIN C)      00                                 (three)           Med[facility]

al



     500 mg                                         times           [facility]



     tablet                                         daily.           

 

     docusate            Yes       [id number] 240mg      Take 1           [facility]

[facility]



     calcium 240      7-26                               capsule by           it

y of



     mg capsule      00:00:                               mouth once           [facility]

[facility]



               00                                 daily as           Medical



                                                  needed for           [facility]



                                                  Constipati           



                                                  on. May           



                                                  substitute           



                                                  for what           



                                                  is in           



                                                  stock and           



                                                  covered by           



                                                  patient           



                                                  plan           

 

     ferrous            Yes       [id number] 325mg      Take 1           [facility]

[facility]



     sulfate 325      7-26                               tablet by           [facility]

 of



     mg (65 mg      00:00:                               mouth 3           [place]



     iron)      00                                 (three)           Medical



     tablet                                         times           [facility]



                                                  daily with           



                                                  meals. May           



                                                  substitute           



                                                  for what           



                                                  is in           



                                                  stock and           



                                                  covered by           



                                                  patient           



                                                  plan           

 

     ibuprofen            Yes       [id number] 600mg      Take 1           

[facility]



     600 mg      7-26                               tablet by           [facility] of



     tablet      00:00:                               mouth           [place]



               00                                 every 6           Medical



                                                  (six)           [facility]



                                                  hours as           



                                                  needed for           



                                                  Pain           



                                                  (scale           



                                                  1-3) or           



                                                  Pain           



                                                  (scale           



                                                  4-6)           



                                                  (Pain).           



                                                  Take with           



                                                  food or           



                                                  milk.           

 

     ascorbic            Yes       [id number] 500mg      Take 1           [facility]

[facility]



     acid,      7-26                               tablet by           [facility] of



     vitamin C,      00:00:                               mouth 3           [place]

[facility]



     (VITAMIN C)      00                                 (three)           Medic

al



     500 mg                                         times           [facility]



     tablet                                         daily.           

 

     foLIC acid            Yes       [id number] 1mg       Take 1           

[facility]



     1 mg tablet      7-26                               tablet by           ity

 of



               00:00:                               mouth           [place]



               00                                 daily.           Medical



                                                                 [facility]

 

     ascorbic            Yes       [id number] 500mg      Take 1           [facility]

n[facility]



     acid,      7-26                               tablet by           ity of



     vitamin C,      00:00:                               mouth 3           [place]

[facility]



     (VITAMIN C)      00                                 (three)           Medic

al



     500 mg                                         times           [facility]



     tablet                                         daily.           

 

     docusate            Yes       [id number] 240mg      Take 1           [facility]

n[facility]



     calcium 240      7-26                               capsule by           it

y of



     mg capsule      00:00:                               mouth once           [facility]

[facility]



               00                                 daily as           [facility]



                                                  needed for           [facility]



                                                  Constipati           



                                                  on. May           



                                                  substitute           



                                                  for what           



                                                  is in           



                                                  stock and           



                                                  covered by           



                                                  patient           



                                                  plan           

 

     ferrous            Yes       [id number] 325mg      Take 1           [facility]

[facility]



     sulfate 325      7-26                               tablet by           [facility]

 of



     mg (65 mg      00:00:                               mouth 3           [place]



     iron)      00                                 (three)           Medical



     tablet                                         times           [facility]



                                                  daily with           



                                                  meals. May           



                                                  substitute           



                                                  for what           



                                                  is in           



                                                  stock and           



                                                  covered by           



                                                  patient           



                                                  plan           

 

     ibuprofen            Yes       [id number] 600mg      Take 1           

[facility]



     600 mg      7-26                               tablet by           ity of



     tablet      00:00:                               mouth           [place]



               00                                 every 6           Medical



                                                  (six)           [facility]



                                                  hours as           



                                                  needed for           



                                                  Pain           



                                                  (scale           



                                                  1-3) or           



                                                  Pain           



                                                  (scale           



                                                  4-6)           



                                                  (Pain).           



                                                  Take with           



                                                  food or           



                                                  milk.           

 

     foLIC acid            Yes       [id number] 1mg       Take 1           

[facility]



     1 mg tablet      7-26                               tablet by           ity

 of



               00:00:                               mouth           [place]



               00                                 daily.           Medical



                                                                 [facility]

 

     ascorbic            Yes       [id number] 500mg      Take 1           [facility]

n[facility]



     acid,      -                               tablet by           [facility] of



     vitamin C,      00:00:                               mouth 3           [place]

[facility]



     (VITAMIN C)      00                                 (three)           Medic

al



     500 mg                                         times           [facility]



     tablet                                         daily.           

 

     HYDROcodone            Yes       [id number] 1{tbl}      Take 1        

   [facility]



     -acetaminop      -26                               tablet by           ity

 of



     hen 5-325      00:00:                               mouth           [place]



     mg tablet      00                                 every 6           Medical



                                                  (six)           [facility]



                                                  hours as           



                                                  needed for           



                                                  Pain           



                                                  (scale           



                                                  7-10).           

 

     ascorbic      2019- No        [id number] 500mg      Take 1           

[facility]



     acid,      - 09-05                          tablet by           ity of



     vitamin C,      00:00: 00:00                          mouth 3           [place]

as



     (VITAMIN C)      00   :00                           (three)           Medic

al



     500 mg                                         times           [facility]



     tablet                                         daily.           

 

     docusate       2019- No        [id number] 240mg      Take 1           

[facility]



     calcium 240      -15                          capsule by           i

ty of



     mg capsule      00:00: 00:00                          mouth once           

[place]



               00   :00                           daily as           Medical



                                                  needed for           [facility]



                                                  Constipati           



                                                  on. May           



                                                  substitute           



                                                  for what           



                                                  is in           



                                                  stock and           



                                                  covered by           



                                                  patient           



                                                  plan           

 

     ferrous       2019- No        [id number] 325mg      Take 1           [facility]

n[facility]



     sulfate 325      -15                          tablet by           it

y of



     mg (65 mg      00:00: 00:00                          mouth 3           [place]

[facility]



     iron)      00   :00                           (three)           Medical



     tablet                                         times           [facility]



                                                  daily with           



                                                  meals. May           



                                                  substitute           



                                                  for what           



                                                  is in           



                                                  stock and           



                                                  covered by           



                                                  patient           



                                                  plan           

 

     ibuprofen       2019- No        [id number] 600mg      Take 1          

 [facility]



     600 mg       08-15                          tablet by           ity of



     tablet      00:00: 00:00                          mouth           [place]



               00   :00                           every 6           Medical



                                                  (six)           [facility]



                                                  hours as           



                                                  needed for           



                                                  Pain           



                                                  (scale           



                                                  1-3) or           



                                                  Pain           



                                                  (scale           



                                                  4-6)           



                                                  (Pain).           



                                                  Take with           



                                                  food or           



                                                  milk.           

 

     foLIC acid       2019- No        [id number] 1mg       Take 1          

 [facility]



     1 mg tablet       08-15                          tablet by           it

y of



               00:00: 00:00                          mouth           [place]



               00   :00                           daily.           Medical



                                                                 [facility]

 

     HYDROcodone       2019- No        [id number] 1{tbl}      Take 1       

    [facility]



     -acetaminop      - 08-15                          tablet by           it

y of



     hen 5-325      00:00: 00:00                          mouth           [place]



     mg tablet      00   :00                           every 6           Medical



                                                  (six)           [facility]



                                                  hours as           



                                                  needed for           



                                                  Pain           



                                                  (scale           



                                                  7-10).           

 

     human       2019- No             .5mL      0.5 mL,           [facility]



     papillomav                                Intramuscu           it

y of



     vac,9-carmelo(P      11:42: 18:03                          lar,           [place]



     F)        18   :00                           ONCE-PRIOR           Medical



     (GARDASIL 9                                         TO             Branch



     (PF)) vial                                         DISCHARGE,           



     0.5 mL                                         1 dose,           



                                                  Starting           



                                                  Thu            



                                                  19 at           



                                                  0642,           



                                                  Until           



                                                  Discontinu           



                                                  ed,            



                                                  Routine,           



                                                  Give           



                                                  vaccine           



                                                  prior to           



                                                  discharge           

 

     varicella       2019- No             .5mL      0.5 mL,           [facility]

ers



     virus                                Subcutaneo           ity of



     vaccine      11:42: 19:25                          ,            [place]



     live      08   :00                           ONCE-PRIOR           Medical



     (VARIVAX                                         TO             [facility]



     (PF))                                         DISCHARGE,           



     injection                                         1 dose,           



     0.5 mL                                         Starting           



                                                  Thu            



                                                  19 at           



                                                  0642,           



                                                  Until           



                                                  Discontinu           



                                                  ed,            



                                                  Routine,           



                                                  Give           



                                                  vaccine           



                                                  prior to           



                                                  discharge           

 

     HYDROcodone      -0      Yes            2{tbl}      2 tablet,          

 [facility]



     -acetaminop      7-25                               Oral,           ity of



     hen (NORCO      05:14:                               Q6HPRN,           [place]

[facility]



     5) 5-325 mg      00                                 Starting           Medi

eliza



     tablet 2                                         Thu            [facility]



     tablet                                         19 at           



                                                  0014,           



                                                  Until           



                                                  Discontinu           



                                                  ed,            



                                                  Routine,           



                                                  Pain           



                                                  (scale           



                                                  7-10), If           



                                                  uncontroll           



                                                  ed by           



                                                  Ibuprofen           

 

     HYDROcodone      -0      Yes            1{tbl}      1 tablet,          

 [facility]



     -acetaminop      7-25                               Oral,           ity of



     hen (NORCO      05:14:                               Q6HPRN,           [place]

[facility]



     5) 5-325 mg      00                                 Starting           Medi

eliza



     tablet 1                                         Thu            [facility]



     tablet                                         19 at           



                                                  0014,           



                                                  Until           



                                                  Discontinu           



                                                  ed,            



                                                  Routine,           



                                                  Pain           



                                                  (scale           



                                                  4-6), If           



                                                  uncontroll           



                                                  ed by           



                                                  Ibuprofen           

 

     ibuprofen      -0      Yes            600mg      600 mg,           [facility]

ers



     (IBU)      7-25                               Oral,           ity of



     tablet 600      05:14:                               Q6HPRN,           [place]

[facility]



     mg        00                                 Starting           Medical



                                                  Thu            [facility]



                                                  19 at           



                                                  0014,           



                                                  Until           



                                                  Discontinu           



                                                  ed,            



                                                  Routine,           



                                                  Pain           



                                                  (scale           



                                                  1-3)           

 

     diphenhydrA      -      Yes            25mg      25 mg,           [facility]

ers



     MINE      7-25                               Oral,           ity of



     (BENADRYL)      05:14:                               Q6HPRN,           [place]

[facility]



     tablet 25      00                                 Starting           Medica

l



     mg                                           Thu            [facility]



                                                  19 at           



                                                  0014,           



                                                  Until           



                                                  Discontinu           



                                                  ed,            



                                                  Routine,           



                                                  Sleep,           



                                                  Itching           

 

     ondansetron      2019-      Yes            4mg       4 mg, Slow           

[facility]



     (ZOFRAN      7-25                               IV Push,           ity of



     (PF))      05:14:                               Q8HPRN,           [place]



     injection 4      00                                 Starting           Medi

eliza



     mg                                           Thu            [facility]



                                                  19 at           



                                                  0014,           



                                                  Until           



                                                  Discontinu           



                                                  ed,            



                                                  Routine,           



                                                  Nausea and           



                                                  Vomiting           



                                                  (N/V)           

 

     bisacodyl            Yes            10mg      10 mg,           [facility]

s



     (DULCOLAX)      725                               Rectal,           ity of



     suppository      05:14:                               QDAILYPRN           

[place]



     10 mg      00                                 Starting           Medical



                                                  u            Branch



                                                  19 at           



                                                  0014,           



                                                  Until           



                                                  Discontinu           



                                                  ed,            



                                                  Routine,           



                                                  Constipati           



                                                  on             

 

     simethicone            Yes            160mg      160 mg,           Un

k[facility]



     (GAS                                     Oral,           ity of



     RELIEF)      05:14:                               PC+HSPRN,           [place]



     chewable      00                                 Starting           Medical



     tablet 160                                         Thu            [facility]



     mg                                           19 at           



                                                  0014,           



                                                  Until           



                                                  Discontinu           



                                                  ed,            



                                                  Routine,           



                                                  Gas            

 

     docusate            Yes            240mg      240 mg,           Unive

rs



     calcium      25                               Oral,           ity of



     (SURFAK)      05:14:                               Julio QURESHI

as



     capsule 240      00                                 Starting           Medi

eliza



     mg                                           Thu            [facility]anch



                                                  19 at           



                                                  0014,           



                                                  Until           



                                                  Discontinu           



                                                  ed,            



                                                  Routine,           



                                                  Constipati           



                                                  on             

 

     magnesium            Yes            30mL      30 mL,           Univer

s



     hydroxide                                     Oral,           ity of



     (MILK OF      05:14:                               [id number]           Julio

as



     MAGNESIA)      00                                 Starting           Medica

l



     400 mg/5 mL                                         Thu            [facility]



     suspension                                         19 at           



     30 mL                                         0014,           



                                                  Until           



                                                  Discontinu           



                                                  ed,            



                                                  Routine,           



                                                  Constipati           



                                                  on             

 

     naloxone       2019- No             .4mg      0.4 mg,           Unive

rs



     (NARCAN)       07-27                          Slow IV           ity of



     injection      05:13: 05:12                          Push, PRN           [facility]

[place]



     0.4 mg      52   :52                           - SEE           Medical



                                                  INSTRUCTIO           [facility]



                                                  NS,            



                                                  Starting           



                                                  Thu            



                                                  19 at           



                                                  0013,           



                                                  Until Sat           



                                                  19 at           



                                                  0012,           



                                                  Routine,           



                                                  Analgesia           



                                                  Recovery,           



                                                  PACU           

 

     diphenhydrA       2019- No             25mg      25 mg,           Uni

vers



     MINE      725 07-26                          Slow IV           ity of



     (BENADRYL)      05:13: 05:12                          Push,           [place]



     injection      52   :52                           Q4HPRN,           Medical



     25 mg                                         Starting           Branch



                                                  Thu            



                                                  19 at           



                                                  0013,           



                                                  Until 19 at           



                                                  0012,           



                                                  Routine,           



                                                  Itching,           



                                                  PACU           

 

     prenatal       2019- No                       Take by           Unive

rs



     vit       -                          mouth.           ity of



     calc,iron,f      04:10: 00:00                                         [place]



     olic      56   :00                                          Medical



     (PRENATAL                                                        [facility]



     VITAMIN                                                        



     ORAL)                                                        

 

     ferrous      2019- No             325mg      Take 325           Univ

ers



     sulfate 325                                mg by           ity of



     mg (65 mg      04:10: 00:00                          mouth 3           [place]

s



     iron)      56   :00                           (three)           Medical



     tablet                                         times           [facility]



                                                  daily with           



                                                  meals.           

 

     azithromyci      2019- No             500mg      500 mg, IV         

  [facility]



     n                                   Piggyback,           ity of



     (ZITHROMAX)      01:59: 05:07                          O.R.           [place]



     500 mg in      39   :00                           HOLDING           Medical



     NaCl 0.9%                                         ONCE, 1           [facility]



     (NS) 250 mL                                         dose,           



     VIAL-MATE                                         Starting           



     IV                                           Wed            



     piggyback                                         19 at           



                                                  ,           



                                                  Until           



                                                  Discontinu           



                                                  ed, 250           



                                                  mL<br>Reas           



                                                  on for           



                                                  Anti-Infec           



                                                  tive:           



                                                  Surgical           



                                                  Prophylaxi           



                                                  s<br>Surgi           



                                                  eliza            



                                                  Prophylaxi           



                                                  s:             



                                                  OB/GYN&lt;           



                                                  br>Duratio           



                                                  n of           



                                                  therapy:           



                                                  within 24           



                                                  hours of           



                                                  surgery           

 

     ceFAZolin      2019- No             2000mg      2 g (2,000          

 [facility]



     in dextrose                                mg), IV           ity 

of



     (iso-os)      01:58: 02:08                          Piggyback,           [facility]

[place]



     (ANCEF) 2      45   :00                           O.R.           Medical



     gram/100 mL                                         HOLDING           Br[facility]

h



     Piggyback 2                                         ONCE, 1           



     g                                            dose,           



                                                  Starting           



                                                  19 at           



                                                  ,           



                                                  Until 19 at           



                                                  , 100           



                                                  mL<br>Reas           



                                                  on for           



                                                  Anti-Infec           



                                                  tive:           



                                                  Surgical           



                                                  Prophylaxi           



                                                  s<br>Surgi           



                                                  eliza            



                                                  Prophylaxi           



                                                  s:             



                                                  OB/GYN<br>           



                                                  Duration           



                                                  of             



                                                  therapy:           



                                                  within 24           



                                                  hours of           



                                                  surgery           

 

     sodium       2019- No             30mL      30 mL,           [facility]



     citrate-cit                                Oral,           ity of



     brayan acid      01:58: 02:09                          PRE-PROCED           Te

[place]



     (BICITRA)      44   :00                           URE ONCE,           Medic

al



     500-334                                         1 dose,           [facility]



     mg/5 mL                                         Starting           



     solution 30                                         Wed            



     mL                                           19 at           



                                                  2058,           



                                                  Until Wed           



                                                  19 at           



                                                  2109,           



                                                  Routine,           



                                                  Surgery           

 

     LR 1000 mL       2019- No             2mU/min      2              Uni

vers



     + oxytocin                                sally-unit           it

y of



     20 units IV      15:45: 05:14                          s/min (6           T

[place]



     Solution      16   :04                           mL/hr), at           Medic

al



                                                  6 mL/hr,           Br[facility]



                                                  IV             



                                                  Infusion,           



                                                  TITRATE,           



                                                  Starting           



                                                  19 at           



                                                  1045,           



                                                  Until Thu           



                                                  19 at           



                                                  0014,           



                                                  ASAP,           



                                                  Oxytocin           



                                                  induction.           

 

     sodium       2019- No             30mL      30 mL,           [facility]



     citrate-cit                                Oral,           ity of



     brayan acid      15:44: 00:09                          PRE-PROCED           Te

[place]



     (BICITRA)      10   :00                           URE ONCE,           Medic

al



     500-334                                         1 dose,           [facility]



     mg/5 mL                                         Starting           



     solution 30                                         Wed            



     mL                                           19 at           



                                                  1044,           



                                                  Until           



                                                  Discontinu           



                                                  ed,            



                                                  Routine,           



                                                  Surgery/Pr           



                                                  ocedure           

 

     lactated       2019- No             500mL      at 999           Unive

rs



     ringers IV                                mL/hr, 500           it

y of



     infusion      15:44: 05:14                          mL, IV           [place]



     500 mL      10   :04                           Infusion,           Medical



                                                  PRN - SEE           Branch



                                                  INSTRUCTIO           



                                                  NS,            



                                                  Starting           



                                                  19 at           



                                                  1044,           



                                                  Until Thu           



                                                  19 at           



                                                  0014,           



                                                  Routine           

 

     No known                No                                      [facility]



     medications                                                        [facility]

 

     No known                No                                      [facility]



     medications                                                        [facility]







Immunizations







           Ordered    Filled Immunization Date       Status     Comments   [facility]

e



           Immunization Name Name                                        

 

           Varicella             2019 Completed             [facility]



           (varivax)(chicken            00:00:00                         [facility]

edical



           pox)                                                   [facility]

 

           [facility]                  2019 Completed             [facility]



                                 00:00:00                         [facility]

 

           Varicella             2019 Completed             [facility]



           (varivax)(chicken            00:00:00                         [facility]

edical



           pox)                                                   Br[facility]

 

           [facility]                  2019 Completed             [facility]



                                 00:00:00                         [facility]

 

           Varicella             2019 Completed             [facility]



           (varivax)(chicken            00:00:00                         [place] [facility]

ed[facility]



           pox)                                                   Br[facility]

 

           [facility]                  2019 Completed             [facility]



                                 00:00:00                         [facility]

 

           Varicella             2019 Completed             [facility]



           (varivax)(chicken            00:00:00                         [place] [facility]

edical



           pox)                                                   Branch

 

           [facility]9                  2019 Completed             [facility]



                                 00:00:00                         [facility]

 

           Varicella             2019 Completed             [facility]



           (varivax)(chicken            00:00:00                         [place] [facility]

edical



           pox)                                                   Branch

 

           [facility]9                  2019 Completed             [facility]



                                 00:00:00                         [facility]

 

           Varicella             2019 Completed             [facility]



           (varivax)(chicken            00:00:00                         [place] [facility]

edical



           pox)                                                   Branch

 

           [facility]9                  2019 Completed             [facility]



                                 00:00:00                         [facility]

 

           Varicella             2019 Completed             [facility]



           (varivax)(chicken            00:00:00                         [place] [facility]

edical



           pox)                                                   Branch

 

           [facility]                  2019 Completed             [facility]



                                 00:00:00                         [facility]

 

           Varicella             2019 Completed             [facility]



           (varivax)(chicken            00:00:00                         [place] [facility]

edical



           pox)                                                   Branch

 

           [facility]                  2019 Completed             [facility]



                                 00:00:00                         [facility]

 

           Varicella             2019 Completed             [facility]



           (varivax)(chicken            00:00:00                         [place] [facility]

edical



           pox)                                                   Branch

 

           [facility]                  2019 Completed             [facility]



                                 00:00:00                         [facility]                  2019 Completed             [facility]



                                 00:00:00                         [facility]

 

           Varicella             2019 Completed             [facility]



           (varivax)(chicken            00:00:00                         [place] [facility]

edical



           pox)                                                   Branch

 

           [facility]                  2019 Completed             [facility]



                                 00:00:00                         [facility]

 

           Varicella             2019 Completed             [facility]



           (varivax)(chicken            00:00:00                         [place] [facility]

edical



           pox)                                                   Branch

 

           [facility]9                  2019 Completed             [facility]



                                 00:00:00                         [facility]

 

           Varicella             2019 Completed             [facility]



           (varivax)(chicken            00:00:00                         [place] [facility]

edical



           pox)                                                   Branch

 

           [facility]                  2019 Completed             [facility]



                                 00:00:00                         [facility]

 

           Varicella             2019 Completed             [facility]



           (varivax)(chicken            00:00:00                         [place] [facility]

edical



           pox)                                                   Branch

 

           [facility]                  2019 Completed             [facility]



                                 00:00:00                         [facility]

 

           Varicella             2019 Completed             [facility]



           (varivax)(chicken            00:00:00                         [place] [facility]

edical



           pox)                                                   Branch

 

           [facility]                  2019 Completed             [facility]



                                 00:00:00                         [facility]

 

           Varicella             2019 Completed             [facility]



           (varivax)(chicken            00:00:00                         [place] [facility]

edical



           pox)                                                   Branch

 

           [facility]                  2019 Completed             [facility]



                                 00:00:00                         [facility]

 

           Varicella             2019 Completed             [facility]



           (varivax)(chicken            00:00:00                         [place] [facility]

edical



           pox)                                                   Branch

 

           [facility]                  2019 Completed             [facility]



                                 00:00:00                         [facility]

 

           Varicella             2019 Completed             [facility]



           (varivax)(chicken            00:00:00                         [place] [facility]

edical



           pox)                                                   Branch

 

           [facility]                  2019 Completed             [facility]



                                 00:00:00                         [facility]

 

           Varicella             2019 Completed             [facility]



           (varivax)(chicken            00:00:00                         [place] [facility]

edical



           pox)                                                   Branch

 

           [facility]                  2019 Completed             [facility]



                                 00:00:00                         [facility]

 

           Varicella             2019 Completed             [facility]



           (varivax)(chicken            00:00:00                         [place] [facility]

edical



           pox)                                                   Branch

 

           [facility]                  2019 Completed             [facility]



                                 00:00:00                         [facility]

 

           Varicella             2019 Completed             [facility]



           (varivax)(chicken            00:00:00                         [place] [facility]

edical



           pox)                                                   Branch

 

           [facility]                  2019 Completed             [facility]



                                 00:00:00                         [facility]

 

           Varicella             2019 Completed             [facility]



           (varivax)(chicken            00:00:00                         [place] [facility]

edical



           pox)                                                   Branch

 

           [facility]                  2019 Completed             [facility]



                                 00:00:00                         [facility]

 

           Varicella             2019 Completed             [facility]



           (varivax)(chicken            00:00:00                         [facility]

[facility]



           pox)                                                   Branch

 

           [facility]                  2019 Completed             [facility]



                                 00:00:00                         [facility]

 

           Varicella             2019 Completed             [facility]



           (varivax)(chicken            00:00:00                         [facility]



           pox)                                                   [facility]

 

           [facility]                  2019 Completed             [facility]



                                 00:00:00                         [facility]

 

           Varicella             2019 Completed             [facility]



           (varivax)(chicken            00:00:00                         [facility]



           pox)                                                   [facility]

 

           [facility]                  2019 Completed             [facility]



                                 00:00:00                         [facility]

 

           Varicella             2019 Completed             [facility]



           (varivax)(chicken            00:00:00                         [facility]



           pox)                                                   Branch

 

           HPV9                  2019 Completed             [facility]



                                 00:00:00                         [facility]

 

           TDAP (ADACEL)            2019 Completed             [facility]



           VACCINE               00:00:00                         [facility]

 

           TDAP (ADACEL)            2019 Completed             [facility]



           VACCINE               00:00:00                         [facility]

 

           TDAP (ADACEL)            2019 Completed             [facility]



           VACCINE               00:00:00                         [facility]

 

           TDAP (ADACEL)            2019 Completed             [facility]



           VACCINE               00:00:00                         [facility]

 

           TDAP (ADACEL)            2019 Completed             [facility]



           VACCINE               00:00:00                         [facility]

 

           TDAP (ADACEL)            2019 Completed             [facility]



           VACCINE               00:00:00                         [facility]

 

           TDAP (ADACEL)            2019 Completed             [facility]



           VACCINE               00:00:00                         [facility]

 

           TDAP (ADACEL)            2019 Completed             [facility]



           VACCINE               00:00:00                         [facility]

 

           TDAP (ADACEL)            2019 Completed             [facility]



           VACCINE               00:00:00                         [facility]

 

           TDAP (ADACEL)            2019 Completed             [facility]



           VACCINE               00:00:00                         [facility]

 

           TDAP (ADACEL)            2019 Completed             [facility]



           VACCINE               00:00:00                         [facility]

 

           TDAP (ADACEL)            2019 Completed             [facility]



           VACCINE               00:00:00                         [facility]

 

           TDAP (ADACEL)            2019 Completed             [facility]



           VACCINE               00:00:00                         [facility]

 

           TDAP (ADACEL)            2019 Completed             [facility]



           VACCINE               00:00:00                         [facility]

 

           TDAP (ADACEL)            2019 Completed             [facility]



           VACCINE               00:00:00                         [facility]

 

           TDAP (ADACEL)            2019 Completed             [facility]



           VACCINE               00:00:00                         [facility]

 

           TDAP (ADACEL)            2019 Completed             [facility]



           VACCINE               00:00:00                         [facility]

 

           TDAP (ADACEL)            2019 Completed             [facility]



           VACCINE               00:00:00                         [facility]

 

           TDAP (ADACEL)            2019 Completed             [facility]



           VACCINE               00:00:00                         [facility]

 

           TDAP (ADACEL)            2019 Completed             [facility]



           VACCINE               00:00:00                         [facility]

 

           TDAP (ADACEL)            2019 Completed             [facility]



           VACCINE               00:00:00                         [facility]

 

           TDAP (ADACEL)            2019 Completed             [facility]



           VACCINE               00:00:00                         [facility]

 

           TDAP (ADACEL)            2019 Completed             [facility]



           VACCINE               00:00:00                         [facility]

 

           TDAP (ADACEL)            2019 Completed             [facility]



           VACCINE               00:00:00                         [facility]

 

           TDAP (ADACEL)            2019 Completed             [facility]



           VACCINE               00:00:00                         [facility]

 

           TDAP (ADACEL)            2019 Completed             [facility]



           VACCINE               00:00:00                         [facility]

 

           Varicella             2016 Completed             [facility]



           (varivax)(chicken            00:00:00                         [facility]



           pox)                                                   [facility]

 

           Varicella             2016 Completed             [facility]



           (varivax)(chicken            00:00:00                         [facility]



           pox)                                                   [facility]

 

           Varicella             2016 Completed             [facility]



           (varivax)(chicken            00:00:00                         [facility]



           pox)                                                   [facility]

 

           Varicella             2016 Completed             [facility]



           (varivax)(chicken            00:00:00                         [facility]



           pox)                                                   [facility]

 

           Varicella             2016 Completed             [facility]



           (varivax)(chicken            00:00:00                         [facility]



           pox)                                                   [facility]

 

           Varicella             2016 Completed             [facility]



           (varivax)(chicken            00:00:00                         [facility]



           pox)                                                   [facility]

 

           Varicella             2016 Completed             [facility]



           (varivax)(chicken            00:00:00                         [facility]



           pox)                                                   [facility]

 

           Varicella             2016 Completed             [facility]



           (varivax)(chicken            00:00:00                         [facility]



           pox)                                                   [facility]

 

           Varicella             2016 Completed             [facility]



           (varivax)(chicken            00:00:00                         [facility]



           pox)                                                   [facility]

 

           Varicella             2016 Completed             [facility]



           (varivax)(chicken            00:00:00                         [facility]



           pox)                                                   [facility]

 

           Varicella             2016 Completed             [facility]



           (varivax)(chicken            00:00:00                         [facility]



           pox)                                                   [facility]

 

           Varicella             2016 Completed             [facility]



           (varivax)(chicken            00:00:00                         [facility]



           pox)                                                   [facility]

 

           Varicella             2016 Completed             [facility]



           (varivax)(chicken            00:00:00                         [facility]



           pox)                                                   [facility]

 

           Varicella             2016 Completed             [facility]



           (varivax)(chicken            00:00:00                         [facility]



           pox)                                                   [facility]

 

           Varicella             2016 Completed             [facility]



           (varivax)(chicken            00:00:00                         [facility]



           pox)                                                   [facility]

 

           Varicella             2016 Completed             [facility]



           (varivax)(chicken            00:00:00                         [facility]



           pox)                                                   [facility]

 

           Varicella             2016 Completed             [facility]



           (varivax)(chicken            00:00:00                         [facility]



           pox)                                                   [facility]

 

           Varicella             2016 Completed             [facility]



           (varivax)(chicken            00:00:00                         [facility]



           [facility]ox)                                                   [facility]

 

           Varicella             2016 Completed             [facility]



           (varivax)(chicken            00:00:00                         [facility]



           [facility]ox)                                                   [facility]

 

           Varicella             2016 Completed             [facility]



           (varivax)(chicken            00:00:00                         [facility]



           [facility]ox)                                                   [facility]

 

           Varicella             2016 Completed             [facility]



           (varivax)(chicken            00:00:00                         [facility]



           [facility]ox)                                                   [facility]

 

           Varicella             2016 Completed             [facility]



           (varivax)(chicken            00:00:00                         [facility]



           pox)                                                   [facility]

 

           Varicella             2016 Completed             [facility]



           (varivax)(chicken            00:00:00                         [facility]



           [facility]ox)                                                   [facility]

 

           Varicella             2016 Completed             [facility]



           (varivax)(chicken            00:00:00                         [facility]



           [facility]ox)                                                   [facility]

 

           Varicella             2016 Completed             [facility]



           (varivax)(chicken            00:00:00                         [facility]



           [facility]ox)                                                   [facility]

 

           Varicella             2016 Completed             [facility]



           (varivax)(chicken            00:00:00                         [facility]



           pox)                                                   [facility]

 

           Influenza Virus            2014-10-14 Completed             [facility]

[facility]



           Vaccine (3+ yrs)            00:00:00                         [facility]

 

           Influenza Virus            2014-10-14 Completed             [facility]

[facility]



           Vaccine (3+ yrs)            00:00:00                         [facility]

 

           Influenza Virus            2014-10-14 Completed             [facility]

[facility]



           Vaccine (3+ yrs)            00:00:00                         [facility]

 

           Influenza Virus            2014-10-14 Completed             [facility]

[facility]



           Vaccine (3+ yrs)            00:00:00                         [facility]

 

           Influenza Virus            2014-10-14 Completed             [facility]

[facility]



           Vaccine (3+ yrs)            00:00:00                         [facility]



                                                                  [facility]

 

           Influenza Virus            2014-10-14 Completed             [facility]

[facility]



           Vaccine (3+ yrs)            00:00:00                         [facility]



                                                                  [facility]

 

           Influenza Virus            2014-10-14 Completed             [facility]

[facility]



           Vaccine (3+ yrs)            00:00:00                         [facility]



                                                                  [facility]

 

           Influenza Virus            2014-10-14 Completed             [facility]

[facility]



           Vaccine (3+ yrs)            00:00:00                         [facility]



                                                                  [facility]

 

           Influenza Virus            2014-10-14 Completed             [facility]

[facility]



           Vaccine (3+ yrs)            00:00:00                         [facility]



                                                                  [facility]

 

           Influenza Virus            2014-10-14 Completed             [facility]

[facility]



           Vaccine (3+ yrs)            00:00:00                         [facility]



                                                                  [facility]

 

           Influenza Virus            2014-10-14 Completed             [facility]

[facility]



           Vaccine (3+ yrs)            00:00:00                         [facility]



                                                                  [facility]

 

           Influenza Virus            2014-10-14 Completed             [facility]

[facility]



           Vaccine (3+ yrs)            00:00:00                         [facility]



                                                                  [facility]

 

           Influenza Virus            2014-10-14 Completed             [facility]

[facility]



           Vaccine (3+ yrs)            00:00:00                         [facility]



                                                                  [facility]

 

           Influenza Virus            2014-10-14 Completed             [facility]

[facility]



           Vaccine (3+ yrs)            00:00:00                         [facility]



                                                                  [facility]

 

           Influenza Virus            2014-10-14 Completed             [facility]

[facility]



           Vaccine (3+ yrs)            00:00:00                         [facility]



                                                                  [facility]

 

           Influenza Virus            2014-10-14 Completed             [facility]

[facility]



           Vaccine (3+ yrs)            00:00:00                         [facility]



                                                                  [facility]

 

           Influenza Virus            2014-10-14 Completed             [facility]

[facility]



           Vaccine (3+ yrs)            00:00:00                         [facility]



                                                                  [facility]

 

           Influenza Virus            2014-10-14 Completed             [facility]

[facility]



           Vaccine (3+ yrs)            00:00:00                         [facility]



                                                                  [facility]

 

           Influenza Virus            2014-10-14 Completed             [facility]

[facility]



           Vaccine (3+ yrs)            00:00:00                         [facility]



                                                                  [facility]

 

           Influenza Virus            2014-10-14 Completed             [facility]

[facility]



           Vaccine (3+ yrs)            00:00:00                         [facility]



                                                                  [facility]

 

           Influenza Virus            2014-10-14 Completed             [facility]

[facility]



           Vaccine (3+ yrs)            00:00:00                         [facility]



                                                                  [facility]

 

           Influenza Virus            2014-10-14 Completed             [facility]

[facility]



           Vaccine (3+ yrs)            00:00:00                         [facility]

 

           Influenza Virus            2014-10-14 Completed             [facility]

[facility]



           Vaccine (3+ yrs)            00:00:00                         [facility]

 

           Influenza Virus            2014-10-14 Completed             [facility]

[facility]



           Vaccine (3+ yrs)            00:00:00                         [facility]

 

           Influenza Virus            2014-10-14 Completed             [facility]

[facility]



           Vaccine (3+ yrs)            00:00:00                         [facility]

 

           Influenza Virus            2014-10-14 Completed             [facility]

[facility]



           Vaccine (3+ yrs)            00:00:00                         [facility]

 

           Tdap                  2013 Completed             [facility]



                                 00:00:00                         [facility]

 

           Tdap                  2013 Completed             [facility]



                                 00:00:00                         [facility]

 

           Tdap                  2013 Completed             [facility]



                                 00:00:00                         [facility]

 

           Tdap                  2013 Completed             [facility]



                                 00:00:00                         [facility]

 

           Tdap                  2013 Completed             [facility]



                                 00:00:00                         [facility]

 

           Tdap                  2013 Completed             [facility]



                                 00:00:00                         [facility]

 

           Tdap                  2013 Completed             [facility]



                                 00:00:00                         [facility]

 

           Tdap                  2013 Completed             [facility]



                                 00:00:00                         [facility]

 

           Tdap                  2013 Completed             [facility]



                                 00:00:00                         [facility]

 

           Tdap                  2013 Completed             [facility]



                                 00:00:00                         [facility]

 

           Tdap                  2013 Completed             [facility]



                                 00:00:00                         [facility]

 

           TDAP                  2013 Completed             [facility]



                                 00:00:00                         [facility]

 

           TDAP                  2013 Completed             [facility]



                                 00:00:00                         [facility]

 

           TDAP                  2013 Completed             [facility]



                                 00:00:00                         [facility]

 

           TDAP                  2013 Completed             [facility]



                                 00:00:00                         [facility]

 

           TDAP                  2013 Completed             [facility]



                                 00:00:00                         [facility]

 

           TDAP                  2013 Completed             [facility]



                                 00:00:00                         [facility]

 

           TDAP                  2013 Completed             [facility]



                                 00:00:00                         [facility]

 

           TDAP                  2013 Completed             [facility]



                                 00:00:00                         [facility]

 

           TDAP                  2013 Completed             [facility]



                                 00:00:00                         [facility]

 

           TDAP                  2013 Completed             [facility]



                                 00:00:00                         [facility]

 

           TDAP                  2013 Completed             [facility]



                                 00:00:00                         [facility]



                                                                  [facility]

 

           TDAP                  2013 Completed             [facility]



                                 00:00:00                         [facility]



                                                                  [facility]

 

           TDAP                  2013 Completed             [facility]



                                 00:00:00                         [facility]



                                                                  [facility]

 

           Tdap                  2013 Completed             [facility]



                                 00:00:00                         [facility]



                                                                  [facility]

 

           TDAP                  2013 Completed             [facility]



                                 00:00:00                         [facility]



                                                                  [place]







Vital Signs







             Vital Name   Observation Time Observation Value Comments     Source

 

             Systolic blood 2022 22:30:00 144 mm[Hg]                [facility]

[facility]



             pressure                                            [facility]



                                                                 [facility]

 

             Diastolic blood 2022 22:30:00 99 mm[Hg]                 [facility]

[facility]



             pressure                                            [facility]



                                                                 [facility]

 

             Heart rate   2022 22:30:00 74 /min                   [facility]

[facility]



                                                                 [facility]



                                                                 [facility]

 

             Respiratory rate 2022 22:30:00 16 /min                   [facility]

[facility]



                                                                 [facility]



                                                                 [facility]

 

             Oxygen saturation in 2022 22:30:00 100 /min                  

[facility]



             Arterial blood by                                        [facility]

[facility]



             Pulse oximetry                                        [facility]

 

             Body temperature 2022 18:32:00 36.83 Megha                 [facility]

[facility]



                                                                 [facility]



                                                                 [facility]

 

             Body weight  2022 18:32:00 107.049 kg                [facility]

[facility]



                                                                 [facility]



                                                                 [facility]

 

             BMI          2022 18:32:00 35.88 kg/m2               [facility]

[facility]



                                                                 [facility]



                                                                 [facility]

 

             Systolic blood 2022 02:40:00 133 mm[Hg]                [facility]

[facility]



             pressure                                            [facility]



                                                                 [facility]

 

             Diastolic blood 2022 02:40:00 86 mm[Hg]                 [facility]

[facility]



             pressure                                            [facility]



                                                                 [facility]

 

             Heart rate   2022 02:40:00 76 /min                   [facility]

[facility]



                                                                 [facility]



                                                                 [facility]

 

             Respiratory rate 2022 02:40:00 16 /min                   [facility]

[facility]



                                                                 [facility]



                                                                 [facility]

 

             Oxygen saturation in 2022 02:40:00 99 /min                   

[facility]



             Arterial blood by                                        [facility]

[facility]



             Pulse oximetry                                        [facility]

 

             Body temperature 2022 00:04:00 37.28 Megha                 [facility]

[facility]



                                                                 [facility]



                                                                 [facility]

 

             Body height  2022 00:04:00 172.7 cm                  [facility]

[facility]



                                                                 [facility]



                                                                 [facility]

 

             Body weight  2022 00:04:00 107.049 kg                [facility]

[facility]



                                                                 [facility]



                                                                 [facility]

 

             BMI          2022 00:04:00 35.88 kg/m2               [facility]

[facility]



                                                                 [facility]



                                                                 [facility]

 

             Systolic blood 2021 05:42:00 134 mm[Hg]                [facility]

[facility]



             pressure                                            [facility]



                                                                 [facility]

 

             Diastolic blood 2021 05:42:00 68 mm[Hg]                 [facility]

[facility]



             pressure                                            [facility]



                                                                 [facility]

 

             Heart rate   2021 05:42:00 107 /min                  [facility]

[facility]



                                                                 [facility]



                                                                 [facility]

 

             Body temperature 2021 05:42:00 38.11 Megha                 [facility]

[facility]



                                                                 [facility]



                                                                 [facility]

 

             Respiratory rate 2021 05:42:00 18 /min                   [facility]

[facility]



                                                                 [facility]



                                                                 [facility]

 

             Oxygen saturation in 2021 05:42:00 98 /min                   

[facility]



             Arterial blood by                                        [facility]

[facility]



             Pulse oximetry                                        [facility]

 

             Body height  2021 03:25:00 172.7 cm                  [facility]

[facility]



                                                                 [facility]



                                                                 [facility]

 

             Body weight  2021 03:25:00 111.585 kg                [facility]

[facility]



                                                                 [facility]



                                                                 [facility]

 

             BMI          2021 03:25:00 37.40 kg/m2               [facility]

[facility]



                                                                 [facility]



                                                                 [facility]

 

             Systolic blood 2021 13:49:00 139 mm[Hg]                [facility]

[facility]



             pressure                                            [facility]



                                                                 [facility]

 

             Diastolic blood 2021 13:49:00 88 mm[Hg]                 [facility]

[facility]



             pressure                                            [facility]



                                                                 [facility]

 

             Heart rate   2021 13:49:00 91 /min                   [facility]

[facility]



                                                                 [facility]



                                                                 [facility]

 

             Body temperature 2021 13:49:00 36.72 Megha                 [facility]

[facility]



                                                                 [facility]



                                                                 [facility]

 

             Respiratory rate 2021 13:49:00 18 /min                   [facility]

[facility]



                                                                 [facility]



                                                                 [facility]

 

             Body height  2021 13:49:00 172.7 cm                  [facility]

[facility]



                                                                 [facility]



                                                                 [facility]

 

             Body weight  2021 13:49:00 107.049 kg                [facility]

[facility]



                                                                 [facility]



                                                                 [facility]

 

             BMI          2021 13:49:00 35.88 kg/m2               [facility]

[facility]



                                                                 [facility]



                                                                 [facility]

 

             Oxygen saturation in 2021 13:49:00 99 /min                   

[facility]



             Arterial blood by                                        [facility]

[facility]



             Pulse oximetry                                        [facility]

 

             Systolic blood 2021 06:52:00 123 mm[Hg]                [facility]

[facility]



             pressure                                            [facility]



                                                                 [facility]

 

             Diastolic blood 2021 06:52:00 82 mm[Hg]                 [facility]

[facility]



             pressure                                            [facility]



                                                                 [facility]

 

             Heart rate   2021 06:52:00 62 /min                   [facility]

[facility]



                                                                 [facility]



                                                                 [facility]

 

             Body temperature 2021 06:52:00 36.72 Megha                 [facility]

[facility]



                                                                 [facility]



                                                                 [facility]

 

             Respiratory rate 2021 06:52:00 16 /min                   [facility]

[facility]



                                                                 [facility]



                                                                 [facility]

 

             Body weight  2021 06:52:00 107.049 kg                [facility]

[facility]



                                                                 [facility]



                                                                 [facility]

 

             BMI          2021 06:52:00 35.88 kg/m2               [facility]

[facility]



                                                                 [facility]



                                                                 [facility]

 

             Oxygen saturation in 2021 06:52:00 99 /min                   

[facility]



             Arterial blood by                                        [facility]

[facility]



             Pulse oximetry                                        [facility]

 

             Systolic blood 2021 05:38:00 172 mm[Hg]                [facility]

[facility]



             pressure                                            [facility]



                                                                 [facility]

 

             Diastolic blood 2021 05:38:00 105 mm[Hg]                [facility]

[facility]



             pressure                                            [facility]



                                                                 [facility]

 

             Heart rate   2021 05:38:00 74 /min                   [facility]

[facility]



                                                                 [facility]



                                                                 [facility]

 

             Respiratory rate 2021 05:38:00 16 /min                   [facility]

[facility]



                                                                 [facility]



                                                                 [facility]

 

             Oxygen saturation in 2021 05:00:00 97 /min                   

[facility]



             Arterial blood by                                        [facility]

[facility]



             Pulse oximetry                                        Branch

 

             Body temperature 2021 03:02:00 37.06 Megha                 [facility]

[facility]



                                                                 [facility]



                                                                 [facility]

 

             Body height  2021 03:02:00 172.7 cm                  [facility]

[facility]



                                                                 [facility]



                                                                 [facility]

 

             Body weight  2021 03:02:00 107.049 kg                [facility]

[facility]



                                                                 [facility]



                                                                 [facility]

 

             BMI          2021 03:02:00 35.88 kg/m2               [facility]

[facility]



                                                                 [facility]



                                                                 [facility]

 

             Heart rate   2021 16:59:00 89 /min                   [facility]

[facility]



                                                                 [facility]



                                                                 [facility]

 

             Body temperature 2021 16:59:00 37.17 Megha                 [facility]

[facility]



                                                                 [facility]



                                                                 [facility]

 

             Respiratory rate 2021 16:59:00 18 /min                   [facility]

[facility]



                                                                 [facility]



                                                                 [facility]

 

             Body weight  2021 16:59:00 107.049 kg                [facility]

[facility]



                                                                 [facility]



                                                                 [facility]

 

             BMI          2021 16:59:00 35.88 kg/m2               [facility]

[facility]



                                                                 [facility]



                                                                 [facility]

 

             Oxygen saturation in 2021 16:59:00 99 /min                   

[facility]



             Arterial blood by                                        [facility]

[facility]



             Pulse oximetry                                        [facility]

 

             Systolic blood 2021-10-29 19:00:00 122 mm[Hg]                [facility]

[facility]



             pressure                                            [facility]



                                                                 [facility]

 

             Diastolic blood 2021-10-29 19:00:00 76 mm[Hg]                 [facility]

[facility]



             pressure                                            [facility]



                                                                 [facility]

 

             Heart rate   2021-10-29 19:00:00 54 /min                   [facility]

[facility]



                                                                 [facility]



                                                                 [facility]

 

             Oxygen saturation in 2021-10-29 19:00:00 100 /min                  

[facility]



             Arterial blood by                                        [facility]

[facility]



             Pulse oximetry                                        [facility]

 

             Respiratory rate 2021-10-29 18:00:00 18 /min                   [facility]

[facility]



                                                                 [facility]



                                                                 [facility]

 

             Body temperature 2021-10-29 14:24:00 36.72 Megha                 [facility]

[facility]



                                                                 [facility]



                                                                 [facility]

 

             Body weight  2021-10-29 14:24:00 107.049 kg                [facility]

[facility]



                                                                 [facility]



                                                                 [facility]

 

             BMI          2021-10-29 14:24:00 35.88 kg/m2               [facility]

[facility]



                                                                 [facility]



                                                                 [facility]

 

             Systolic blood 2020 00:00:00 119 mm[Hg]                [facility]

[facility]



             pressure                                            [facility]



                                                                 [facility]

 

             Diastolic blood 2020 00:00:00 79 mm[Hg]                 [facility]

[facility]



             pressure                                            [facility]



                                                                 [facility]

 

             Heart rate   2020 00:00:00 84 /min                   [facility]

[facility]



                                                                 [facility]



                                                                 [facility]

 

             Body temperature 2020 00:00:00 36.83 Megha                 [facility]

[facility]



                                                                 [facility]



                                                                 [facility]

 

             Respiratory rate 2020 00:00:00 22 /min                   [facility]

[facility]



                                                                 [facility]



                                                                 [facility]

 

             Oxygen saturation in 2020 00:00:00 100 /min                  

[facility]



             Arterial blood by                                        [facility]

[facility]



             Pulse oximetry                                        [facility]

 

             Body weight  2020 22:45:00 107.049 kg                [facility]

[facility]



                                                                 [facility]



                                                                 [facility]

 

             BMI          2020 22:45:00 35.88 kg/m2               [facility]

[facility]



                                                                 [facility]



                                                                 [facility]

 

             Systolic blood 2020 20:41:00 134 mm[Hg]                [facility]

[facility]



             pressure                                            [facility]



                                                                 [facility]

 

             Diastolic blood 2020 20:41:00 87 mm[Hg]                 [facility]

[facility]



             pressure                                            [facility]



                                                                 [facility]

 

             Heart rate   2020 20:41:00 91 /min                   [facility]

[facility]



                                                                 [facility]



                                                                 [facility]

 

             Body temperature 2020 20:41:00 37.22 Megha                 [facility]

[facility]



                                                                 [facility]



                                                                 [facility]

 

             Respiratory rate 2020 20:41:00 18 /min                   [facility]

[facility]



                                                                 [facility]



                                                                 [facility]

 

             Body weight  2020 20:41:00 105.235 kg                [facility]

[facility]



                                                                 [facility]



                                                                 [facility]

 

             BMI          2020 20:41:00 35.28 kg/m2               [facility]

[facility]



                                                                 [facility]



                                                                 [facility]

 

             Oxygen saturation in 2020 20:41:00 99 /min                   

[facility]



             Arterial blood by                                        [facility]

[facility]



             Pulse oximetry                                        [facility]

 

             Oxygen saturation in 2019 15:45:00 99 /min                   

[facility]



             Arterial blood by                                        [facility]

[facility]



             Pulse oximetry                                        [facility]

 

             Systolic blood 2019 15:15:00 123 mm[Hg]                [facility]

[facility]



             pressure                                            [facility]



                                                                 [facility]

 

             Diastolic blood 2019 15:15:00 86 mm[Hg]                 [facility]

[facility]



             pressure                                            [facility]



                                                                 [facility]

 

             Heart rate   2019 15:15:00 74 /min                   [facility]

[facility]



                                                                 [facility]



                                                                 [facility]

 

             Respiratory rate 2019 15:15:00 17 /min                   [facility]

[facility]



                                                                 [facility]



                                                                 [facility]

 

             Body temperature 2019 14:00:00 36.22 Megha                 [facility]

[facility]



                                                                 [facility]



                                                                 [facility]

 

             Body height  2019 11:12:00 172.7 cm                  [facility]

[facility]



                                                                 [facility]



                                                                 [facility]

 

             Body weight  2019 11:12:00 109 kg                    [facility]

[facility]



                                                                 [facility]



                                                                 [facility]

 

             BMI          2019 11:12:00 36.54 kg/m2               [facility]

[facility]



                                                                 [facility]



                                                                 [facility]

 

             Systolic blood 2019-08-15 15:26:00 142 mm[Hg]                [facility]

[facility]



             pressure                                            [facility]



                                                                 [facility]

 

             Diastolic blood 2019-08-15 15:26:00 93 mm[Hg]                 [facility]

[facility]



             pressure                                            [facility]



                                                                 [facility]

 

             Heart rate   2019-08-15 15:26:00 72 /min                   [facility]

[facility]



                                                                 [facility]



                                                                 [facility]

 

             Body temperature 2019-08-15 15:26:00 35.28 Megha                 [facility]

[facility]



                                                                 [facility]



                                                                 [facility]

 

             Respiratory rate 2019-08-15 15:26:00 16 /min                   [facility]

[facility]



                                                                 [facility]



                                                                 [facility]

 

             Body height  2019-08-15 15:26:00 172.7 cm                  [facility]

[facility]



                                                                 [facility]



                                                                 [facility]

 

             Body weight  2019-08-15 15:26:00 105.8 kg                  [facility]

[facility]



                                                                 [facility]



                                                                 [facility]

 

             BMI          2019-08-15 15:26:00 35.47 kg/m2               [facility]

[facility]



                                                                 [facility]



                                                                 [facility]

 

             Oxygen saturation in 2019-08-15 15:26:00 100 /min                  

[facility] 



             Arterial blood by                                        [facility]

[facility]



             Pulse oximetry                                        [facility]

 

             Systolic blood 2019 15:07:00 120 mm[Hg]                Univer

sity of



             pressure                                            [facility]



                                                                 Branch

 

             Diastolic blood 2019 15:07:00 82 mm[Hg]                 Unive

rsity of



             pressure                                            [facility]



                                                                 Branch

 

             Heart rate   2019 15:06:00 68 /min                   Universi

ty of



                                                                 [facility]



                                                                 [place]

 

             Body temperature 2019 15:06:00 36.67 Megha                 Univ

ersity of



                                                                 [facility]



                                                                 Branch

 

             Respiratory rate 2019 15:06:00 16 /min                   Univ

ersity of



                                                                 [facility]



                                                                 [place]

 

             Body height  2019 15:06:00 172.7 cm                  Universi

ty of



                                                                 [facility]



                                                                 Branch

 

             Body weight  2019 15:06:00 106.369 kg                Universi

ty of



                                                                 [facility]



                                                                 Branch

 

             BMI          2019 15:06:00 35.66 kg/m2               Universi

ty of



                                                                 [facility]



                                                                 Branch

 

             Systolic blood 2019 16:16:00 124 mm[Hg]                Univer

sity of



             pressure                                            [facility]



                                                                 Branch

 

             Diastolic blood 2019 16:16:00 78 mm[Hg]                 Unive

rsity of



             pressure                                            [facility]



                                                                 Branch

 

             Heart rate   2019 16:11:00 87 /min                   Universi

ty of



                                                                 [facility]



                                                                 Branch

 

             Body temperature 2019 16:11:00 37.22 Megha                 Univ

ersity of



                                                                 [facility]

 

             Respiratory rate 2019 16:11:00 16 /min                   Univ

ersity of



                                                                 [facility]



                                                                 Branch

 

             Body height  2019 16:11:00 172.7 cm                  Universi

ty of



                                                                 [facility]



                                                                 Branch

 

             Body weight  2019 16:11:00 110.791 kg                Universi

ty of



                                                                 [facility]



                                                                 Branch

 

             BMI          2019 16:11:00 37.14 kg/m2               Universi

ty of



                                                                 [facility]



                                                                 Branch

 

             Systolic blood 2019 13:00:00 122 mm[Hg]                Univer

sity of



             pressure                                            [facility]



                                                                 Branch

 

             Diastolic blood 2019 13:00:00 78 mm[Hg]                 Unive

rsity of



             pressure                                            [facility]



                                                                 Branch

 

             Heart rate   2019 13:00:00 80 /min                   [facility]

 

             Body temperature 2019 13:00:00 36.61 Megha                 [facility]

 

             Respiratory rate 2019 13:00:00 20 /min                   [facility]

 

             Oxygen saturation in 2019 13:00:00 99 /min                   

University 



             Arterial blood by                                        [facility]

el[facility]



             Pulse oximetry                                        Branch







Procedures







                Procedure       Date / Time     Performing Clinician Source



                                Performed                       

 

                US PELVIS COMPLETE WITH 2022 22:18:00 Chris Narvaez Univ

[facility] [place]



                TRANSVAGINAL                                    [facility]

 

                COMP. METABOLIC PANEL 2022 19:43:00 [facility]

[facility] [place]



                ([id number])                                         [facility]

 

                CBC WITH DIFF   2022 19:43:00 [facility]

 

                URINALYSIS      2022 19:43:00 Singer [facility]

 

                ADC CLC OR [facility] ONLY - 2022 19:43:00 Chris Narvaez Univer

[facility] [place]



                WET PREP                                        [facility]

 

                CONSENT/REFUSAL FOR 2022 18:24:53 Doctor Unassigned, No Un

iversity of 

[place]



                DIAGNOSIS AND TREATMENT                 [facility]

 

                POCT PREGNANCY TEST 2022 01:40:00 Sloan Nelson [facility]

 

                URINALYSIS      2022 01:38:00 Sloan Nelson [facility]

 

                RAPID INFLUENZA A/B 2021 03:32:00 PORSCHE Power [facility]

 

                COVID-19 (ID NOW RAPID 2021 03:32:00 PORSCHE Power [facility]



                TESTING)                                        Medical Branch

 

                CONSENT/REFUSAL FOR 2021 03:00:49 Doctor Unassigned, No Un

[facility] 

[place]



                DIAGNOSIS AND TREATMENT                 Name            Medical 

Branch

 

                CONSENT/REFUSAL FOR 2021 13:45:43 Doctor Unassigned, No Un

iversity of 

[place]



                DIAGNOSIS AND TREATMENT                 [facility]            Medical 

[place]

 

                EMERGENCY DEPARTMENT 2021 06:01:00 Doctor Unassigned, No [facility]



                DOCUMENTS                       Name            Medical Branch

 

                CT SOFT TISSUE NECK W 2021 03:55:42 Cynthia Fitch [facility]



                CONTRAST                                        Medical [place]

 

                PREGNANCY TEST, SERUM 2021 03:36:00 [facility]

[facility]



                                                                Medical [place]

 

                BASIC METABOLIC PANEL 2021 03:36:00 Cynthia Fitch [facility]



                (NA, K, CL, CO2,                                 Medical Branch



                GLUCOSE, BUN,                                   



                CREATININE, CA)                                 

 

                CBC WITH DIFF   2021 03:36:00 Cynthia Fitch [facility]

 

                NOTICE OF PRIVACY 2021 02:57:07 Doctor Unassigned, No [facility]                       Name            Medical Branch

 

                CONSENT/REFUSAL FOR 2021 02:55:44 Doctor Unassigned, No [facility]



                DIAGNOSIS AND TREATMENT                 Name            Medical 

[place]

 

                RAPID STREP SCREEN FOR 2021 17:03:00 Chris Narvaez [facility]



                GROUP A                                         Medical Branch

 

                CONSENT/REFUSAL FOR 2021 16:53:00 Doctor Unassigned, No [facility]



                DIAGNOSIS AND TREATMENT                 Name            Medical 

[place]

 

                CT ABDOMEN PELVIS W 2021-10-29 17:50:39 Bharti Feuntes [facility]



                CONTRAST                                        Medical [place]

 

                US PELVIS COMPLETE WITH 2021-10-29 16:38:35 Bharti Fuentes [facility]



                TRANSVAGINAL                                    Medical Branch

 

                POCT PREGNANCY TEST 2021-10-29 14:34:00 Bharti Fuentes [facility]



                                                                Medical [place]

 

                LIPASE          2021-10-29 14:31:00 Bharti Fuentes [facility]

 

                COMP. METABOLIC PANEL 2021-10-29 14:31:00 Bharti Fuentes [facility]



                ([id number])                                         Medical Branch

 

                CBC WITH DIFF   2021-10-29 14:31:00 Bharti Fuentes [facility]

 

                URINALYSIS      2021-10-29 14:31:00 Bharti Fuentes [facility]

 

                COVID-19 (ID NOW RAPID 2021-10-29 14:31:00 [facility]

[facility]



                TESTING)                                        Medical Branch

 

                NOTICE OF PRIVACY 2021-10-29 14:20:02 Doctor Unassigned, No Univ

[facility] [place]



                PRACTICES                       Name            Medical Branch

 

                CONSENT/REFUSAL FOR 2021-10-29 14:19:50 Doctor Unassigned, No [facility] 

[place]



                DIAGNOSIS AND TREATMENT                 [facility]

 

                RAPID STREP SCREEN FOR 2020 23:28:00 Cristobal Rodrigues [facility]                                         [facility]

 

                POCT PREGNANCY TEST 2020 23:01:00 Cristobal Rodrigues [facility]

 

                NOTICE OF PRIVACY 2020 22:36:17 Doctor Unassigned, No [facility]

 

                CONSENT/REFUSAL FOR 2020 22:36:05 Doctor Unassigned, No [facility]



                DIAGNOSIS AND TREATMENT                 [facility]

 

                INDIRECT ANTIGLOBULIN 2019 11:30:00 Katy Jones [facility]



                TEST                            Hannah           [facility]

 

                ASSIGNMENT OF BENEFITS 2019 09:55:52 Doctor Unassigned, No

 [facility]

 

                TYPE AND SCREEN 2019-08-15 15:52:00 [facility]  [facility]

 

                CBC WITH DIFFERENTIAL 2019-08-15 15:51:00 [facility]  [facility]

 

                ASSIGNMENT OF BENEFITS 2019-08-15 15:15:47 Doctor Unassigned, No

 [facility]

 

                DAY SURGERY - [place] 2019-08-15 05:01:00 Doctor Unassigned, N

o [facility]

 

                CBC WITH DIFFERENTIAL 2019 07:36:00 Pancho   [facility]

 

                VENOUS CORD GAS 2019 03:29:00 Brennan Taylor        [facility]

 

                 SECTION 2019 02:20:00 Edson Pierce [facility]

 

                URIC ACID       2019 02:06:00 Catrachito Butt  [facility]

 

                CBC WITH DIFFERENTIAL 2019 02:06:00 Catrachito Butt  [facility]

 

                SGOT (ASPARTATE AMINO 2019 02:06:00 Catrachito Butt  [facility]



                TRANSFER)                                       Medical Branch

 

                CREATININE      2019 02:06:00 Filomena [facility]

 

                ALANINE AMINO   2019 02:06:00 [facility]



                TRANSFERASE(SGPT                                 [facility]

 

                LACTATE DEHYDROGENASE 2019 02:06:00 Filomena [facility]

 

                URINALYSIS      2019 02:05:00 Halifax [facility]

 

                PROTEIN CREAT RATIO 2019 02:05:00 Filomena mykel  [facility]



                URINE RANDOM                                    [facility]

 

                CBC WITH DIFFERENTIAL 2019 16:13:00 Claudia, [facility]

 

                HEPATITIS B SURFACE 2019 16:13:00 Claudia, Brennan        [facility] [place]



                ANTIGEN                                         [facility]

 

                GALV ONLY - SYPHILIS 2019 16:13:00 Claudia, [facility]



                IGG/IGM                                         [facility]

 

                TYPE AND SCREEN 2019 15:54:00 Claudia, [facility]







Encounters







        Start   End     Encounter Admission Attending Care    Care    Encounter 

Source



        Date/Time Date/Time Type    Type    Clinicians Facility Department ID   

   

 

        2021-10-30         Emergency                 [facility]    [id number] 

Univers



        07:04:42                                                         [facility]

 

        2021-10-28         Emergency                 [facility]    [id number] 

Univers



        23:03:26                                                         [facility]

 

        2022 Emergency X       GUSTABOUNM [facility]    ERT     [id number]

34 Univers



        13:33:00 18:01:00                 CHRIS moulton [facility]

 

        2022 Emergency         [facility]    [id number]

[id number] Univers



        13:33:00 18:01:00                 Chris MANZANARES [id number]         i

ty [facility] [id number]         [facility]  [phone]         Medi

eliza



                                                        084             Branch

 

        2022 Emergency X       [facility]    ERT     [id number]

07 Univers



        19:06:00 21:48:00                 SLOAN                          [facility]

 

        2022 Emergency         [facility]    [id number]

2788 Univers



        19:06:00 21:48:00                 Sloan MANZANARES [id number]         

ity of



                                                [facility] [id number]         [facility]  [phone]         Medi

eliza



                                                        084             [place]

 

        2021 Emergency X       PORSCHE POEWR [facility]    ERT     [id number]

2044 Univers



        21:27:00 23:45:00                                                 ity of



                                                                        [facility]

 

        2021 Emergency         [facility]    [id number]

[id number] Univers



        21:27:00 23:45:00                 Carol MANZANARES [id number]         i

ty of



                                                [place] [id number]         [facility]  [phone]         [address]

 

        2021 Emergency X       [facility]    ERT     [id number]

02 Univers



        07:49:00 08:30:00                 CHRIS                         itcristina [facility]

 

        2021 Emergency         [facility]    [id number]

[id number] Univers



        07:49:00 08:30:00                 Chris MANZANARES [id number]         i

ty of



                                                [place] [address] [place] [address]  [phone]         Medi

eliza



                                                        084             [place]

 

        2021 Emergency         [place], TRAUMA  [id number]

[id number] Univers



        00:53:00 02:30:00                 Tom  ERIS  [id number]         it

y of



                                                        [id number]         [place]

s



                                                        [phone]         Medi

eliza



                                                        014             Branch

 

        2021 Orders          Doctor  ALEKSEY    [id number]

77 Univers



        00:00:00 00:00:00 Only            Unassigned, SHERRIE   [id number]       

  ity of



                                        [facility] [id number]         Julio

as



                                                        [phone]         Medi

eliza



                                                        009             Branch

 

        2021 Emergency X       VITALY, [facility]    ERT     [id number]

561 Univers



        21:05:00 23:46:00                 CYNTHIA moulton [facility]

 

        2021 Emergency X       VITALY, [facility]    ERT     [id number]

665 Univers



        21:05:00 23:46:00                 CYNTHIA moulton [facility]

 

        2021 Emergency         Vitaly, [facility]    [id number]

[id number] Univers



        21:05:00 23:46:00                 Cynthia MANZANARES [id number]         i

ty of



                                                [place] [id number]         [facility]  [phone]         [address]

 

        2021 Emergency X       [facility]    ERT     [id number]

35 Univers



        11:03:00 12:01:00                 CHRIS moulton [facility]

 

        2021 Emergency         [facility]    [id number]

6143 Univers



        11:03:00 12:01:00                 Chris MANZANARES 350.1.13.10         i

ty of



                                                [place] 4.2.7.2.6         [facility]  [phone]         [address]

 

        2021 Orders          Doctor RYDER    [id number]

39 Univers



        00:00:00 00:00:00 Only            UnassignedSHERRIE   350.1.13.10       

  ity of



                                        [facility] [id number]         Julio

as



                                                        [phone]         Medi

eliza



                                                        009             Branch

 

        2021-10-29 2021-10-29 Emergency X       [facility]    ERT     [id number]

42 Univers



        09:23:00 14:05:00                 BHARTI moulton [facility]

 

        2021-10-29 2021-10-29 Emergency         [facility]    [id number]

7153 Univers



        09:23:00 14:05:00                 Bharti MANZANARES 350.1.13.10         i

ty of



                                                [facility] 4.2.7.2.6         [facility]  [phone]         [address]

 

        2021-10-29 2021-10-29 Orders          Doctor RYDER    [id number]

46 Univers



        00:00:00 00:00:00 Only            Unassigned, SHERRIE   350.1.13.10       

  ity of



                                        [facility] [id number]         Julio

as



                                                        [phone]         Medi

eliza



                                                        009             [place]

 

        2020 Telephone         ALEKSEY Steele    [id number]

[id number] Univers



        00:00:00 00:00:00                 Fabienne HERCULESY   350.1.13.10         it

y of



                                                HOSPITAL [id number]         Julio

as



                                                        [phone]         Medi

eliza



                                                        019             [place]

 

        2020 Emergency         Ibun, [facility]    1.2.840.114 [id number]

[id number] Univers



        16:49:00 18:31:00                 Cristobal Manzanares 350.1.13.10        

 ity of



                                                [place] [id number]         [place]

[facility]  [phone]         Medi

eliza



                                                        084             [place]

 

        2020 Orders          Doctor  ALEKSEY    1.2.840.114 [id number]

68 Univers



        00:00:00 00:00:00 Only            Unassigned, SHERRIE   350.1.13.10       

  ity of



                                        [facility] [id number]         Julio

as



                                                        [phone]         Medi

eliza



                                                        009             [place]

 

        2020 Outpatient R       FATMATA [facility]    [id number]

[id number] Univers



        11:00:00 11:00:00                 OMCOSMEEMI                         ity of



                                                                        [facility]

 

        2020 Telephone         Tess [facility]    [id number] 

[id number] Univers



        00:00:00 00:00:00                 Jasmin  350.1.13.10       

  ity of



                                        CIRILO       [place] [id number]         Julio

as



                                                [employer] [phone]         Me

dical



                                                nal     [address]             [place]



                                                Office                  



                                                Building                 



                                                One                     

 

        2020 Urgent          Pob[facility]    1.

2.840.114 [id number] 

Univers



        15:27:21 15:47:21 Care            Jasmin [facility]  350.1

.13.10         ity of



                                                [place] [id number]         Julio

as



                                                [employer]io [phone]         Me

dical



                                                nal     044             [place]



                                                Office                  



                                                Building                 



                                                One                     

 

        2020 Outpatient R               [facility]    [id number]

423 Univers



        15:40:00 15:40:00                                                 ity of



                                                                        [facility]

 

        2019 [facility]         Jo Ann Batista  [id number]

[id number] Univers



        04:56:19 10:45:00 Encounter         Prem SIERRA  Shrerie   350.1.13.10         

ity of



                                                Hospital [id number]         Julio

as



                                                        [phone]         Medi

eliza



                                                        104             [place]

 

        2019 Orders          Doctor  ALEKSEY    1.2.840.114 [id number]

35 [facility]



        00:00:00 00:00:00 Only            Unassigned, SHERRIE   350.1.13.10       

  ity of



                                        [facility] 4.2.7.2.686         Julio

as



                                                        [phone]         Medi

eliza



                                                        009             [place]

 

        2019-08-15 2019-08-15 [facility]         Jo Ann Batista  1.2.401.361 [id number]

3615 [facility]



        10:17:00 13:55:00 Encounter         Prem [facility]  [place]   350.1.13.10         

ity of



                                                Hospital 4.2.7.2.686         Julio

as



                                                        [phone]         Medi

eliza



                                                        101             [place]

 

        2019-08-15 2019-08-15 Orders          Doctor  ALEKSEY    1.2.840.114 [id number]

83 [facility]



        00:00:00 00:00:00 Only            Unassigned, SHERRIE   350.1.13.10       

  ity of



                                        [facility] 4.2.7.2.686         Julio

as



                                                        [phone]         Medi

eliza



                                                        009             [place]

 

        2019-08-15 2019-08-15 Prep For         MARI Jones 1.2.840.114

 [id number] 

[facility]



        00:00:00 00:00:00 Surgery         [facility] 350.1.13.10         i

ty of



                                        [facility] 4.2.7.2.686         [place]

s



                                                        [phone]         Medi

eliza



                                                        113             [place]

 

        2019 Routine         Rahul [facility]    1.2.840.114 [id number]

94 Univers



        09:50:02 10:35:34 Prenatal         [facility]  350.1.13.10       

  ity of



                        Visit                   [facility] 4.2.7.2.686         [place]

as



                                                MATERNAL [phone]         Med

ical



                                                & CHILD 107             [facility]                 

 

        2019 Nurse           Visit, ChrisRmchp Nurse [facility]    1.2

.840.114 [id number] 

Univers



        10:43:43 11:33:58 Visit           Harleen [facility]  350.1.13.

10         ity of



                                                REGIONAL 4.2.7.2.686         [place]

as



                                                MATERNAL [phone]         Med

ical



                                                & CHILD 107             [facility]                 

 

        2019 [facility]         ALEKSEY Núñez    1.2.840.114 [id number]

458 [facility]



        09:36:00 15:52:00 Encounter         Shanell SHERRIE   350.1.13.10        

 ity [facility] 4.2.7.2.686         Julio

as



                                                        [phone]         Medi

eliza



                                                        063             [place]

 

        2019 Prep For         MARI Sanders 1.2.840.114 [id number]

[id number] [facility]



        00:00:00 00:00:00 Surgery         [facility] 350.1.13.10         i

ty of



                                                [facility] 4.2.7.2.686         [place]

s



                                                        [phone]         Medi

eliza



                                                        113             [place]







Results







           Test Description Test Time  Test Comments Results    Result Comments 

Source









                    COMP. METABOLIC PANEL (24018) 2022 20:15:18 









                      Test Item  Value      Reference Range Interpretation Comme

nts









             NA (test code = 8626442297) 140 mmol/L   135-145                   

 

             K (test code = 3140431115) 3.7 mmol/L   3.5-5.0                   

 

             CL (test code = 7388149717) 103 mmol/L                       

 

             CO2 TOTAL (test code = 1666215821) 29 mmol/L    23-31              

       

 

             AGAP (test code = 2431507796)              2-16                    

  

 

             BUN (test code = 0735379982) 13 mg/dL     7-23                     

 

 

             GLUCOSE (test code = 8251353297) 75 mg/dL                    

     

 

             CREATININE (test code = 0.93 mg/dL   0.50-1.04                 



             6237719142)                                         

 

             TOTAL BILI (test code = 0.4 mg/dL    0.1-1.1                   



             7343997820)                                         

 

             CALCIUM (test code = 2579656540) 8.5 mg/dL    8.6-10.6     L       

     

 

             T PROTEIN (test code = 2860514635) 8.1 g/dL     6.3-8.2            

       

 

             ALBUMIN (test code = 4259416964) 4.3 g/dL     3.5-5.0              

     

 

             ALK PHOS (test code = 0046905406) 80 U/L                     

      

 

             ALTv (test code = 1742-6) 15 U/L       5-35                      

 

             AST(SGOT) (test code = 8985096929) 29 U/L       13-40              

       

 

             eGFR (test code = 1776742198)              mL/min/1.73m2           

   

 

             ALFONZO (test code = ALFONZO) Association of Glomerular                    

       



                          Filtration Rate (GFR) and Staging                     

      



                          of Kidney Disease*                           



                          +-----------------------+--------                     

      



                          -------------+-------------------                     

      



                          ------+| GFR (mL/min/1.73 m2) ?|                      

     



                          With Kidney Damage ?| ?Without                        

   



                          Kidney                                 



                          Damage+-----------------------+--                     

      



                          -------------------+-------------                     

      



                          ------------+| ?>90 ? ? ? ? ? ? ?                     

      



                          ? ?| ?Stage one ? ? ? ? ?| ?                          

 



                          Normal ? ? ? ? ? ? ?                           



                          ?+-----------------------+-------                     

      



                          --------------+------------------                     

      



                          -------+| ?60-89 ? ? ? ? ? ? ? ?|                     

      



                          ?Stage two ? ? ? ? ?| ? Decreased                     

      



                          GFR ? ? ? ?                            



                          +-----------------------+--------                     

      



                          -------------+-------------------                     

      



                          ------+| ?30-59 ? ? ? ? ? ? ? ?|                      

     



                          ?Stage three ? ? ? ?| ? Stage                         

  



                          three ? ? ? ? ?                           



                          +-----------------------+--------                     

      



                          -------------+-------------------                     

      



                          ------+| ?15-29 ? ? ? ? ? ? ? ?|                      

     



                          ?Stage four ? ? ? ? | ? Stage                         

  



                          four ? ? ? ? ?                           



                          ?+-----------------------+-------                     

      



                          --------------+------------------                     

      



                          -------+| ?<15 (or dialysis) ? ?|                     

      



                          ?Stage five ? ? ? ? | ? Stage                         

  



                          five ? ? ? ? ?                           



                          ?+-----------------------+-------                     

      



                          --------------+------------------                     

      



                          -------+ *Each stage assumes the                      

     



                          associated GFR level has been in                      

     



                          effect for at least three months.                     

      



                          ?Stages 1 to 5, with or without                       

    



                          kidney disease, indicate chronic                      

     



                          kidney disease. Notes:                           



                          Determination of stages one and                       

    



                          two (with eGFR >59mL/min/1.73 m2)                     

      



                          requires estimation of kidney                         

  



                          damage for at least three months                      

     



                          as defined by structural or                           



                          functional abnormalities of the                       

    



                          kidney, manifested by                           



                          either:Pathological abnormalities                     

      



                          or Markers of kidney damage                           



                          (including abnormalities in the                       

    



                          composition of the blood or urine                     

      



                          or abnormalities in imaging                           



                          tests).                                

 

             Lab Interpretation (test code = Abnormal                           

    



             49639-9)                                            



[facility] WITH [id number] 19:53:31





             Test Item    Value        Reference Range Interpretation Comments

 

             WBC (test code =              See_Comment                [Automated



             90-2)                                             message] The sy

stem



                                                                 which generated



                                                                 this result



                                                                 transmitted



                                                                 reference range

:



                                                                 4.30 - 11.10



                                                                 10*3/?L. The



                                                                 reference range

 was



                                                                 not used to



                                                                 interpret this



                                                                 result as



                                                                 normal/abnormal

.

 

             RBC (test code =              See_Comment                [Automated



             789-8)                                              message] The sy

stem



                                                                 which generated



                                                                 this result



                                                                 transmitted



                                                                 reference range

:



                                                                 3.93 - 5.25



                                                                 10*6/?L. The



                                                                 reference range

 was



                                                                 not used to



                                                                 interpret this



                                                                 result as



                                                                 normal/abnormal

.

 

             HGB (test code = 9.7 g/dL     11.6-15.0    L            



             718-7)                                              

 

             HCT (test code = 32.3 %       35.7-45.2    L            



             4544-3)                                             

 

             MCV (test code = 76.0 fL      80.6-95.5    L            



             787-2)                                              

 

             MCH (test code = 22.8 pg      25.9-32.8    L            



             785-6)                                              

 

             MCHC (test code = 30.0 g/dL    31.6-35.1    L            



             786-4)                                              

 

             RDW-SD (test code = 50.2 fL      39.0-49.9    H            



             24834-2)                                            

 

             RDW-CV (test code = 18.1 %       12.0-15.5    H            



             788-0)                                              

 

             PLT (test code =              See_Comment  H             [Automated



             777-3)                                              message] The sy

stem



                                                                 which generated



                                                                 this result



                                                                 transmitted



                                                                 reference range

:



                                                                 166 - 358 10*3/

?L.



                                                                 The reference r

luis



                                                                 was not used to



                                                                 interpret this



                                                                 result as



                                                                 normal/abnormal

.

 

             MPV (test code = 9.8 fL       9.5-12.9                  



             36143-1)                                            

 

             NRBC/100 WBC (test              See_Comment                [Automat

ed



             code = 5630187842)                                        message] 

The system



                                                                 which generated



                                                                 this result



                                                                 transmitted



                                                                 reference range

:



                                                                 0.0 - 10.0 /100



                                                                 WBCs. The refer

ence



                                                                 range was not u

sed



                                                                 to interpret th

is



                                                                 result as



                                                                 normal/abnormal

.

 

             NRBC x10^3 (test code <0.01        See_Comment                [Auto

mated



             = 0183905471)                                        message] The s

ystem



                                                                 which generated



                                                                 this result



                                                                 transmitted



                                                                 reference range

:



                                                                 10*3/?L. The



                                                                 reference range

 was



                                                                 not used to



                                                                 interpret this



                                                                 result as



                                                                 normal/abnormal

.

 

             GRAN MAT (NEUT) % 51.2 %                                 



             (test code = 770-8)                                        

 

             IMM GRAN % (test code 0.20 %                                 



             = 0549684988)                                        

 

             LYMPH % (test code = 36.1 %                                 



             736-9)                                              

 

             MONO % (test code = 9.4 %                                  



             5905-5)                                             

 

             EOS % (test code = 2.4 %                                  



             713-8)                                              

 

             BASO % (test code = 0.7 %                                  



             706-2)                                              

 

             GRAN MAT x10^3(ANC) 2.95 10*3/uL 1.88-7.09                 



             (test code =                                        



             0276749961)                                         

 

             IMM GRAN x10^3 (test <0.03        0.00-0.06                 



             code = 3130683081)                                        

 

             LYMPH x10^3 (test code 2.08 10*3/uL 1.32-3.29                 



             = 731-0)                                            

 

             MONO x10^3 (test code 0.54 10*3/uL 0.33-0.92                 



             = 742-7)                                            

 

             EOS x10^3 (test code = 0.14 10*3/uL 0.03-0.39                 



             711-2)                                              

 

             BASO x10^3 (test code 0.04 10*3/uL 0.01-0.07                 



             = 704-7)                                            

 

             Lab Interpretation Abnormal                               



             (test code = 90686-8)                                        



[id number]





             Test Item    Value        Reference Range Interpretation Comments

 

             POCT PREG (test code = 1605) negative                              

 

 

             On board controls acceptable with present                          

      



             C Line (test code = 3574)                                        

 

             POCT PREG LOT # (test code = 3575) [id number]                      

       

 

             POCT PREG TEST  DATE (test                         

     



             code = 3576)                                        

 

             Lab Interpretation (test code = Normal                             

    



             96001-5)                                            



[id number] 03[facility]59:03





             Test Item    Value        Reference Range Interpretation Comments

 

             PREG SERUM (test code Negative                               



             = 2093904163)                                        

 

             ALFONZO (test code = ALFONZO) Less than 10 IU/L. ?If                       

    



                          low titer or ectopic                           



                          pregnancy is suspected,                           



                          resubmit specimen in                           



                          48-72 hours.                           



[facility] METABOLIC PANEL (NA, K, CL, CO2, 
GLUCOSE, BUN, CREATININE, CA)2021 03:55:47





             Test Item    Value        Reference Range Interpretation Comments

 

             NA (test code = 135 mmol/L   135-145                   



             1752132424)                                         

 

             K (test code = 4.8 mmol/L   3.5-5.0                   



             9402865043)                                         

 

             CL (test code = 102 mmol/L                       



             4504621176)                                         

 

             CO2 TOTAL (test code = 25 mmol/L    23-31                     



             0437487077)                                         

 

             AGAP (test code =              2-16                      



             3491916067)                                         

 

             BUN (test code = 12 mg/dL     7-23                      



             1814886104)                                         

 

             GLUCOSE (test code = 129 mg/dL           H            



             6376402443)                                         

 

             CREATININE (test code = 0.70 mg/dL   0.50-1.04                 



             7996466742)                                         

 

             CALCIUM (test code = 9.1 mg/dL    8.6-10.6                  



             1050204679)                                         

 

             eGFR (test code =              mL/min/1.73m2              



             6964382310)                                         

 

             ALFONZO (test code = ALFONZO) Association of                           



                          Glomerular Filtration                           



                          Rate (GFR) and Staging                           



                          of Kidney Disease*                           



                          +---------------------                           



                          --+-------------------                           



                          --+-------------------                           



                          ------+| GFR                           



                          (mL/min/1.73 m2) ?|                           



                          With Kidney Damage ?|                           



                          ?Without Kidney                           



                          Damage+---------------                           



                          --------+-------------                           



                          --------+-------------                           



                          ------------+| ?>90 ?                           



                          ? ? ? ? ? ? ? ?|                           



                          ?Stage one ? ? ? ? ?|                           



                          ? Normal ? ? ? ? ? ? ?                           



                          ?+--------------------                           



                          ---+------------------                           



                          ---+------------------                           



                          -------+| ?60-89 ? ? ?                           



                          ? ? ? ? ?| ?Stage two                           



                          ? ? ? ? ?| ? Decreased                           



                          GFR ? ? ? ?                            



                          +---------------------                           



                          --+-------------------                           



                          --+-------------------                           



                          ------+| ?30-59 ? ? ?                           



                          ? ? ? ? ?| ?Stage                           



                          three ? ? ? ?| ? Stage                           



                          three ? ? ? ? ?                           



                          +---------------------                           



                          --+-------------------                           



                          --+-------------------                           



                          ------+| ?15-29 ? ? ?                           



                          ? ? ? ? ?| ?Stage four                           



                          ? ? ? ? | ? Stage four                           



                          ? ? ? ? ?                              



                          ?+--------------------                           



                          ---+------------------                           



                          ---+------------------                           



                          -------+| ?<15 (or                           



                          dialysis) ? ?| ?Stage                           



                          five ? ? ? ? | ? Stage                           



                          five ? ? ? ? ?                           



                          ?+--------------------                           



                          ---+------------------                           



                          ---+------------------                           



                          -------+ *Each stage                           



                          assumes the associated                           



                          GFR level has been in                           



                          effect for at least                           



                          three months. ?Stages                           



                          1 to 5, with or                           



                          without kidney                           



                          disease, indicate                           



                          chronic kidney                           



                          disease. Notes:                           



                          Determination of                           



                          stages one and two                           



                          (with eGFR                             



                          >59mL/min/1.73 m2)                           



                          requires estimation of                           



                          kidney damage for at                           



                          least three months as                           



                          defined by structural                           



                          or functional                           



                          abnormalities of the                           



                          kidney, manifested by                           



                          either:Pathological                           



                          abnormalities or                           



                          Markers of kidney                           



                          damage (including                           



                          abnormalities in the                           



                          composition of the                           



                          blood or urine or                           



                          abnormalities in                           



                          imaging tests).                           

 

             Lab Interpretation Abnormal                               



             (test code = 90569-1)                                        



[facility] WITH [id number] 03:44:45





             Test Item    Value        Reference Range Interpretation Comments

 

             WBC (test code =              See_Comment                [Automated



             6690-2)                                             message] The sy

stem



                                                                 which generated



                                                                 this result



                                                                 transmitted



                                                                 reference range

:



                                                                 4.30 - 11.10



                                                                 10*3/?L. The



                                                                 reference range

 was



                                                                 not used to



                                                                 interpret this



                                                                 result as



                                                                 normal/abnormal

.

 

             RBC (test code =              See_Comment                [Automated



             789-8)                                              message] The sy

stem



                                                                 which generated



                                                                 this result



                                                                 transmitted



                                                                 reference range

:



                                                                 3.93 - 5.25



                                                                 10*6/?L. The



                                                                 reference range

 was



                                                                 not used to



                                                                 interpret this



                                                                 result as



                                                                 normal/abnormal

.

 

             HGB (test code = 9.6 g/dL     11.6-15.0    L            



             718-7)                                              

 

             HCT (test code = 31.1 %       35.7-45.2    L            



             4544-3)                                             

 

             MCV (test code = 76.4 fL      80.6-95.5    L            



             787-2)                                              

 

             MCH (test code = 23.6 pg      25.9-32.8    L            



             785-6)                                              

 

             MCHC (test code = 30.9 g/dL    31.6-35.1    L            



             786-4)                                              

 

             RDW-SD (test code = 45.8 fL      39.0-49.9                 



             01626-3)                                            

 

             RDW-CV (test code = 16.6 %       12.0-15.5    H            



             788-0)                                              

 

             PLT (test code =              See_Comment                [Automated



             777-3)                                              message] The sy

stem



                                                                 which generated



                                                                 this result



                                                                 transmitted



                                                                 reference range

:



                                                                 166 - 358 10*3/

?L.



                                                                 The reference r

luis



                                                                 was not used to



                                                                 interpret this



                                                                 result as



                                                                 normal/abnormal

.

 

             MPV (test code = 9.7 fL       9.5-12.9                  



             22406-3)                                            

 

             NRBC/100 WBC (test              See_Comment                [Automat

ed



             code = 6438959769)                                        message] 

The system



                                                                 which generated



                                                                 this result



                                                                 transmitted



                                                                 reference range

:



                                                                 0.0 - 10.0 /100



                                                                 WBCs. The refer

ence



                                                                 range was not u

sed



                                                                 to interpret th

is



                                                                 result as



                                                                 normal/abnormal

.

 

             NRBC x10^3 (test code <0.01        See_Comment                [Auto

mated



             = 0478420200)                                        message] The s

ystem



                                                                 which generated



                                                                 this result



                                                                 transmitted



                                                                 reference range

:



                                                                 10*3/?L. The



                                                                 reference range

 was



                                                                 not used to



                                                                 interpret this



                                                                 result as



                                                                 normal/abnormal

.

 

             GRAN MAT (NEUT) % 49.9 %                                 



             (test code = 770-8)                                        

 

             IMM GRAN % (test code 0.20 %                                 



             = 7842686663)                                        

 

             LYMPH % (test code = 38.3 %                                 



             736-9)                                              

 

             MONO % (test code = 9.2 %                                  



             5905-5)                                             

 

             EOS % (test code = 1.7 %                                  



             713-8)                                              

 

             BASO % (test code = 0.7 %                                  



             706-2)                                              

 

             GRAN MAT x10^3(ANC) 3.00 10*3/uL 1.88-7.09                 



             (test code =                                        



             1283252231)                                         

 

             IMM GRAN x10^3 (test <0.03        0.00-0.06                 



             code = 1311632959)                                        

 

             LYMPH x10^3 (test code 2.30 10*3/uL 1.32-3.29                 



             = 731-0)                                            

 

             MONO x10^3 (test code 0.55 10*3/uL 0.33-0.92                 



             = 742-7)                                            

 

             EOS x10^3 (test code = 0.10 10*3/uL 0.03-0.39                 



             711-2)                                              

 

             BASO x10^3 (test code 0.04 10*3/uL 0.01-0.07                 



             = 704-7)                                            

 

             Lab Interpretation Abnormal                               



             (test code = 20023-4)                                        



[facility]





             Test Item    Value        Reference Range Interpretation Comments

 

             NA (test code = 137 mmol/L   135-145                   



             7192297528)                                         

 

             K (test code = 3.8 mmol/L   3.5-5.0                   



             8854203877)                                         

 

             CL (test code = 105 mmol/L                       



             3771074351)                                         

 

             CO2 TOTAL (test code = 25 mmol/L    23-31                     



             8065295210)                                         

 

             AGAP (test code =              2-16                      



             6886990939)                                         

 

             BUN (test code = 9 mg/dL      7-23                      



             6673167595)                                         

 

             GLUCOSE (test code = 126 mg/dL           H            



             7747872602)                                         

 

             CREATININE (test code = 0.76 mg/dL   0.50-1.04                 



             5840084701)                                         

 

             TOTAL BILI (test code = 0.4 mg/dL    0.1-1.1                   



             1850367309)                                         

 

             CALCIUM (test code = 9.1 mg/dL    8.6-10.6                  



             8208050623)                                         

 

             T PROTEIN (test code = 7.5 g/dL     6.3-8.2                   



             4561354898)                                         

 

             ALBUMIN (test code = 4.0 g/dL     3.5-5.0                   



             2631716799)                                         

 

             ALK PHOS (test code = 68 U/L                           



             1467242598)                                         

 

             ALTv (test code = 13 U/L       5-35                      



             1742-6)                                             

 

             AST(SGOT) (test code = 23 U/L       13-40                     



             7717987785)                                         

 

             eGFR (test code =              mL/min/1.73m2              



             4299902083)                                         

 

             ALFONZO (test code = ALFONZO) Association of                           



                          Glomerular Filtration                           



                          Rate (GFR) and Staging                           



                          of Kidney Disease*                           



                          +---------------------                           



                          --+-------------------                           



                          --+-------------------                           



                          ------+| GFR                           



                          (mL/min/1.73 m2) ?|                           



                          With Kidney Damage ?|                           



                          ?Without Kidney                           



                          Damage+---------------                           



                          --------+-------------                           



                          --------+-------------                           



                          ------------+| ?>90 ?                           



                          ? ? ? ? ? ? ? ?|                           



                          ?Stage one ? ? ? ? ?|                           



                          ? Normal ? ? ? ? ? ? ?                           



                          ?+--------------------                           



                          ---+------------------                           



                          ---+------------------                           



                          -------+| ?60-89 ? ? ?                           



                          ? ? ? ? ?| ?Stage two                           



                          ? ? ? ? ?| ? Decreased                           



                          GFR ? ? ? ?                            



                          +---------------------                           



                          --+-------------------                           



                          --+-------------------                           



                          ------+| ?30-59 ? ? ?                           



                          ? ? ? ? ?| ?Stage                           



                          three ? ? ? ?| ? Stage                           



                          three ? ? ? ? ?                           



                          +---------------------                           



                          --+-------------------                           



                          --+-------------------                           



                          ------+| ?15-29 ? ? ?                           



                          ? ? ? ? ?| ?Stage four                           



                          ? ? ? ? | ? Stage four                           



                          ? ? ? ? ?                              



                          ?+--------------------                           



                          ---+------------------                           



                          ---+------------------                           



                          -------+| ?<15 (or                           



                          dialysis) ? ?| ?Stage                           



                          five ? ? ? ? | ? Stage                           



                          five ? ? ? ? ?                           



                          ?+--------------------                           



                          ---+------------------                           



                          ---+------------------                           



                          -------+ *Each stage                           



                          assumes the associated                           



                          GFR level has been in                           



                          effect for at least                           



                          three months. ?Stages                           



                          1 to 5, with or                           



                          without kidney                           



                          disease, indicate                           



                          chronic kidney                           



                          disease. Notes:                           



                          Determination of                           



                          stages one and two                           



                          (with eGFR                             



                          >59mL/min/1.73 m2)                           



                          requires estimation of                           



                          kidney damage for at                           



                          least three months as                           



                          defined by structural                           



                          or functional                           



                          abnormalities of the                           



                          kidney, manifested by                           



                          either:Pathological                           



                          abnormalities or                           



                          Markers of kidney                           



                          damage (including                           



                          abnormalities in the                           



                          composition of the                           



                          blood or urine or                           



                          abnormalities in                           



                          imaging tests).                           

 

             Lab Interpretation Abnormal                               



             (test code = 69611-8)                                        



[facility]38





             Test Item    Value        Reference Range Interpretation Comments

 

             LIPASE (test code = 0661091710) 78 U/L       0-220                 

    

 

             Lab Interpretation (test code = Normal                             

    



             61587-4)                                            



[facility]41:38





             Test Item    Value        Reference Range Interpretation Comments

 

             WBC (test code =              See_Comment                [Automated



             6690-2)                                             message] The sy

stem



                                                                 which generated



                                                                 this result



                                                                 transmitted



                                                                 reference range

:



                                                                 4.30 - 11.10



                                                                 10*3/?L. The



                                                                 reference range

 was



                                                                 not used to



                                                                 interpret this



                                                                 result as



                                                                 normal/abnormal

.

 

             RBC (test code =              See_Comment                [Automated



             789-8)                                              message] The sy

stem



                                                                 which generated



                                                                 this result



                                                                 transmitted



                                                                 reference range

:



                                                                 3.93 - 5.25



                                                                 10*6/?L. The



                                                                 reference range

 was



                                                                 not used to



                                                                 interpret this



                                                                 result as



                                                                 normal/abnormal

.

 

             HGB (test code = 9.6 g/dL     11.6-15.0    L            



             718-7)                                              

 

             HCT (test code = 31.4 %       35.7-45.2    L            



             4544-3)                                             

 

             MCV (test code = 76.2 fL      80.6-95.5    L            



             787-2)                                              

 

             MCH (test code = 23.3 pg      25.9-32.8    L            



             785-6)                                              

 

             MCHC (test code = 30.6 g/dL    31.6-35.1    L            



             786-4)                                              

 

             RDW-SD (test code = 46.2 fL      39.0-49.9                 



             58783-0)                                            

 

             RDW-CV (test code = 16.8 %       12.0-15.5    H            



             788-0)                                              

 

             PLT (test code =              See_Comment                [Automated



             777-3)                                              message] The sy

stem



                                                                 which generated



                                                                 this result



                                                                 transmitted



                                                                 reference range

:



                                                                 166 - 358 10*3/

?L.



                                                                 The reference r

luis



                                                                 was not used to



                                                                 interpret this



                                                                 result as



                                                                 normal/abnormal

.

 

             MPV (test code = 9.7 fL       9.5-12.9                  



             81393-3)                                            

 

             NRBC/100 WBC (test              See_Comment                [Automat

ed



             code = 0392190973)                                        message] 

The system



                                                                 which generated



                                                                 this result



                                                                 transmitted



                                                                 reference range

:



                                                                 0.0 - 10.0 /100



                                                                 WBCs. The refer

ence



                                                                 range was not u

sed



                                                                 to interpret th

is



                                                                 result as



                                                                 normal/abnormal

.

 

             NRBC x10^3 (test code <0.01        See_Comment                [Auto

mated



             = [id number])                                        message] The s

ystem



                                                                 which generated



                                                                 this result



                                                                 transmitted



                                                                 reference range

:



                                                                 10*3/?L. The



                                                                 reference range

 was



                                                                 not used to



                                                                 interpret this



                                                                 result as



                                                                 normal/abnormal

.

 

             GRAN MAT (NEUT) % 50.0 %                                 



             (test code = 770-8)                                        

 

             IMM GRAN % (test code 0.20 %                                 



             = 1344407129)                                        

 

             LYMPH % (test code = 39.3 %                                 



             736-9)                                              

 

             MONO % (test code = 7.1 %                                  



             5905-5)                                             

 

             EOS % (test code = 2.8 %                                  



             713-8)                                              

 

             BASO % (test code = 0.6 %                                  



             706-2)                                              

 

             GRAN MAT x10^3(ANC) 2.67 10*3/uL 1.88-7.09                 



             (test code =                                        



             7523204271)                                         

 

             IMM GRAN x10^3 (test <0.03        0.00-0.06                 



             code = 1122237089)                                        

 

             LYMPH x10^3 (test code 2.10 10*3/uL 1.32-3.29                 



             = 731-0)                                            

 

             MONO x10^3 (test code 0.38 10*3/uL 0.33-0.92                 



             = 742-7)                                            

 

             EOS x10^3 (test code = 0.15 10*3/uL 0.03-0.39                 



             711-2)                                              

 

             BASO x10^3 (test code 0.03 10*3/uL 0.01-0.07                 



             = 704-7)                                            

 

             Lab Interpretation Abnormal                               



             (test code = 96722-5)                                        



[facility] Pregnancy Test2021-10-29 14:34:00





             Test Item    Value        Reference Range Interpretation Comments

 

             POCT PREG (test code = 1605) negative                              

 

 

             On board controls acceptable with present                          

      



             C Line (test code = 3574)                                        

 

             POCT PREG LOT # (test code = 3575) [id number]                      

       

 

             POCT PREG TEST  DATE (test                                   

     



             code = 3576)                                        

 

             Lab Interpretation (test code = Normal                             

    



             66302-0)                                            



[facility] STREP SCREEN FOR GROUP  
23:58:00





             Test Item    Value        Reference Range Interpretation Comments

 

             Streptococcus pyogenes (group A) Positive     Negative     A       

     



             antigen (test code = 15233-4)                                      

  

 

             Lab Interpretation (test code = Abnormal                           

    



             93119-5)                                            



[facility] PREGNANCY [id number] 23:01:00





             Test Item    Value        Reference Range Interpretation Comments

 

             POCT PREG (test code = 1605) negative                              

 

 

             POCT PREG LOT # (test code = 3575) [id number]                      

       

 

             POCT PREG TEST  DATE (test 2022                        

     



             code = 3576)                                        

 

             Lab Interpretation (test code = Normal                             

    



             83764-0)                                            



[facility] [id number] 13:02:18





             Test Item    Value        Reference Range Interpretation Comments

 

             IAT (test code = Negative                               Performed a

t [facility]



             1185)                                               Laboratory Serv

ices - GAL



                                                                 Blood [address]

[place], [place]



                                                                 [id number] Free:



                                                                 [phone]

A No.



                                                                 [id number]



[facility]2019-[facility] 16:43:00





             Test Item    Value        Reference Range Interpretation Comments

 

             WBC (test code =              See_Comment  L             [Automated



             6690-2)                                             message] The sy

stem



                                                                 which generated



                                                                 this result



                                                                 transmitted



                                                                 reference range

:



                                                                 4.30 - 11.10



                                                                 10*3/?L. The



                                                                 reference range

 was



                                                                 not used to



                                                                 interpret this



                                                                 result as



                                                                 normal/abnormal

.

 

             RBC (test code =              See_Comment                [Automated



             789-8)                                              message] The sy

stem



                                                                 which generated



                                                                 this result



                                                                 transmitted



                                                                 reference range

:



                                                                 3.93 - 5.25



                                                                 10*6/?L. The



                                                                 reference range

 was



                                                                 not used to



                                                                 interpret this



                                                                 result as



                                                                 normal/abnormal

.

 

             HGB (test code = 10.6 g/dL    11.6-15      L            



             718-7)                                              

 

             HCT (test code = 36.1 %       35.7-45.2                 



             4544-3)                                             

 

             MCV (test code = 78.5 fL      80.6-95.5    L            



             787-2)                                              

 

             MCH (test code = 23.0 pg      25.9-32.8    L            



             785-6)                                              

 

             MCHC (test code = 29.4 g/dL    31.6-35.1    L            



             786-4)                                              

 

             RDW-SD (test code = 54.3 fL      39-49.9      H            



             59937-3)                                            

 

             RDW-CV (test code = 19.1 %       12-15.5      H            



             788-0)                                              

 

             PLT (test code =              See_Comment                [Automated



             777-3)                                              message] The sy

stem



                                                                 which generated



                                                                 this result



                                                                 transmitted



                                                                 reference range

:



                                                                 166 - 358 10*3/

?L.



                                                                 The reference r

luis



                                                                 was not used to



                                                                 interpret this



                                                                 result as



                                                                 normal/abnormal

.

 

             MPV (test code = 11.1 fL      9.5-12.9                  



             43343-4)                                            

 

             NRBC/100 WBC (test              See_Comment                [Automat

ed



             code = 9708725199)                                        message] 

The system



                                                                 which generated



                                                                 this result



                                                                 transmitted



                                                                 reference range

:



                                                                 0.0 - 10.0 /100



                                                                 WBCs. The refer

ence



                                                                 range was not u

sed



                                                                 to interpret th

is



                                                                 result as



                                                                 normal/abnormal

.

 

             NRBC x10^3 (test code <0.01        See_Comment                [Auto

mated



             = 4246301626)                                        message] The s

ystem



                                                                 which generated



                                                                 this result



                                                                 transmitted



                                                                 reference range

:



                                                                 10*3/?L. The



                                                                 reference range

 was



                                                                 not used to



                                                                 interpret this



                                                                 result as



                                                                 normal/abnormal

.

 

             GRAN MAT (NEUT) % 39.5 %                                 



             (test code = 770-8)                                        

 

             IMM GRAN % (test code 0.00 %                                 



             = 5847928460)                                        

 

             LYMPH % (test code = 48.6 %                                 



             736-9)                                              

 

             MONO % (test code = 7.7 %                                  



             5905-5)                                             

 

             EOS % (test code = 2.9 %                                  



             713-8)                                              

 

             BASO % (test code = 1.3 %                                  



             706-2)                                              

 

             GRAN MAT x10^3(ANC) 1.23 10*3/uL 1.88-7.09    L            



             (test code =                                        



             0665012920)                                         

 

             IMM GRAN x10^3 (test <0.03        0-0.06                    



             code = 7874699097)                                        

 

             LYMPH x10^3 (test code 1.51 10*3/uL 1.32-3.29                 



             = 731-0)                                            

 

             MONO x10^3 (test code 0.24 10*3/uL 0.33-0.92    L            



             = 742-7)                                            

 

             EOS x10^3 (test code = 0.09 10*3/uL 0.03-0.39                 



             711-2)                                              

 

             BASO x10^3 (test code 0.04 10*3/uL 0.01-0.07                 



             = 704-7)                                            

 

             ELLIPTO/OVAL (test 2+           See_Comment  A             [Automat

ed



             code = 02694-1)                                        message] The

 system



                                                                 which generated



                                                                 this result



                                                                 transmitted



                                                                 reference range

:



                                                                 (none). The



                                                                 reference range

 was



                                                                 not used to



                                                                 interpret this



                                                                 result as



                                                                 normal/abnormal

.

 

             Lab Interpretation Abnormal                               



             (test code = 74579-2)                                        



[facility] - The Type and Screen expires 
at midnight on the 3rd day after it was drawn. A current Type and Screen is 
required when RBCs are requested. For all other blood products, a Type and Scree
n performed during the current hospitalizati...2019-08-15 16:35:07





             Test Item    Value        Reference Range Interpretation Comments

 

             ABO & RH (test code O POSITIVE                             Performe

d at [facility]



             = 20)                                               Laboratory Serv

ices -



                                                                 GAL Blood Bank3

[address]



                                                                 [facility]

s



                                                                 [id number] Free:



                                                                 [phone]

A No.



                                                                 [id number]

 

             IAT (test code = Negative                               Performed a

t [facility]



             11[address])                                               Laboratory Serv

ices -



                                                                 GAL Blood Bank3

[address]



                                                                 [facility]

s



                                                                 [id number] Free:



                                                                 [phone]

A No.



                                                                 [id number]



[facility] SYPHILIS IGG/[id number] 
14:50:00





             Test Item    Value        Reference Range Interpretation Comments

 

             Syphilis IgG/IgM (test Non-reactive Non-reactive              



             code = 49482-3)                                        

 

             ALFONZO (test code = ALFONZO) Non-reactive - No                           



                          serologic evidence                           



                          of T. pallidum                           



                          infection. Cannot                           



                          exclude incubating                           



                          or early syphilis.                           



                          Submit a second                           



                          specimen in 2-4                           



                          weeks if syphilis is                           



                          clinically                             



                          suspected.Equivocal                           



                          - Further testing to                           



                          follow.Reactive -                           



                          Further testing to                           



                          follow.                                

 

             Lab Interpretation (test Normal                                 



             code = 88869-7)                                        



[facility] WITH [id number] 07:57:00





             Test Item    Value        Reference Range Interpretation Comments

 

             WBC (test code =              See_Comment                [Automated



             4490-2)                                             message] The sy

stem



                                                                 which generated



                                                                 this result



                                                                 transmitted



                                                                 reference range

:



                                                                 4.30 - 11.10



                                                                 10*3/?L. The



                                                                 reference range

 was



                                                                 not used to



                                                                 interpret this



                                                                 result as



                                                                 normal/abnormal

.

 

             RBC (test code =              See_Comment  L             [Automated



             339-8)                                              message] The sy

stem



                                                                 which generated



                                                                 this result



                                                                 transmitted



                                                                 reference range

:



                                                                 3.93 - 5.25



                                                                 10*6/?L. The



                                                                 reference range

 was



                                                                 not used to



                                                                 interpret this



                                                                 result as



                                                                 normal/abnormal

.

 

             HGB (test code = 7.9 g/dL     11.6-15      L            



             718-7)                                              

 

             HCT (test code = 27.1 %       35.7-45.2    L            



             4544-3)                                             

 

             MCV (test code = 79.9 fL      80.6-95.5    L            



             787-2)                                              

 

             MCH (test code = 23.3 pg      25.9-32.8    L            



             785-6)                                              

 

             MCHC (test code = 29.2 g/dL    31.6-35.1    L            



             786-4)                                              

 

             RDW-SD (test code = 56.2 fL      39-49.9      H            



             05772-6)                                            

 

             RDW-CV (test code = 19.7 %       12-15.5      H            



             788-0)                                              

 

             PLT (test code =              See_Comment  L             [Automated



             777-3)                                              message] The sy

stem



                                                                 which generated



                                                                 this result



                                                                 transmitted



                                                                 reference range

:



                                                                 166 - 358 10*3/

?L.



                                                                 The reference r

luis



                                                                 was not used to



                                                                 interpret this



                                                                 result as



                                                                 normal/abnormal

.

 

             MPV (test code = 10.3 fL      9.5-12.9                  



             75533-5)                                            

 

             NRBC/100 WBC (test              See_Comment                [Automat

ed



             code = 1861928448)                                        message] 

The system



                                                                 which generated



                                                                 this result



                                                                 transmitted



                                                                 reference range

:



                                                                 0.0 - 10.0 /100



                                                                 WBCs. The refer

ence



                                                                 range was not u

sed



                                                                 to interpret th

is



                                                                 result as



                                                                 normal/abnormal

.

 

             NRBC x10^3 (test code <0.01        See_Comment                [Auto

mated



             = 5371237887)                                        message] The s

ystem



                                                                 which generated



                                                                 this result



                                                                 transmitted



                                                                 reference range

:



                                                                 10*3/?L. The



                                                                 reference range

 was



                                                                 not used to



                                                                 interpret this



                                                                 result as



                                                                 normal/abnormal

.

 

             GRAN MAT (NEUT) % 76.9 %                                 



             (test code = 770-8)                                        

 

             IMM GRAN % (test code 0.60 %                                 



             = 5553609271)                                        

 

             LYMPH % (test code = 14.2 %                                 



             736-9)                                              

 

             MONO % (test code = 7.6 %                                  



             5905-5)                                             

 

             EOS % (test code = 0.3 %                                  



             713-8)                                              

 

             BASO % (test code = 0.4 %                                  



             706-2)                                              

 

             GRAN MAT x10^3(ANC) 5.96 10*3/uL 1.88-7.09                 



             (test code =                                        



             0586165171)                                         

 

             IMM GRAN x10^3 (test 0.05 10*3/uL 0-0.06                    



             code = 2888262647)                                        

 

             LYMPH x10^3 (test code 1.10 10*3/uL 1.32-3.29    L            



             = 731-0)                                            

 

             MONO x10^3 (test code 0.59 10*3/uL 0.33-0.92                 



             = 742-7)                                            

 

             EOS x10^3 (test code = <0.03        0.03-0.39    L            



             711-2)                                              

 

             BASO x10^3 (test code 0.03 10*3/uL 0.01-0.07                 



             = 704-7)                                            

 

             Lab Interpretation Abnormal                               



             (test code = 63906-7)                                        



[id number]





             Test Item    Value        Reference Range Interpretation Comments

 

             BASE EXCESS, CORD              mEq/L                     



             (test code =                                        



             1354949632)                                         

 

             AC PH, CORD (BEAKER)              7.18-7.38                 



             (test code =                                        



             5559329174)                                         

 

             PC02, CORD (test code              See_Comment                [Auto

mated message] The



             = 1495822576)                                        system which g

enerated this



                                                                 result transmit

tisha



                                                                 reference range

: 32 - 66



                                                                 mmHg. The refer

ence range



                                                                 was not used to

 interpret



                                                                 this result as



                                                                 normal/abnormal

.

 

             PO2, CORD (test code              See_Comment                [Autom

ated message] The



             = 9190381913)                                        system which g

enerated this



                                                                 result transmit

tisha



                                                                 reference range

: 10 - 30



                                                                 mmHg. The refer

ence range



                                                                 was not used to

 interpret



                                                                 this result as



                                                                 normal/abnormal

.

 

             BICARBONATE, CORD              See_Comment                [Automate

d message] The



             (test code =                                        system which ge

nerated this



             3806339358)                                         result transmit

tisha



                                                                 reference range

: 17 - 27



                                                                 mEq/L. The refe

rence range



                                                                 was not used to

 interpret



                                                                 this result as



                                                                 normal/abnormal

.



[id number]





             Test Item    Value        Reference Range Interpretation Comments

 

             VENOUS BASE EXCESS,              mEq/L                     



             CORD (test code =                                        



             3671406284)                                         

 

             VENOUS PH, CORD (test              7.25-7.45                 



             code = 8406826357)                                        

 

             VENOUS PC02, CORD              See_Comment                [Automate

d message] The



             (test code =                                        system which ge

nerated



             3552209453)                                         this result tra

nsmitted



                                                                 reference range

: 27 - 49



                                                                 mmHg. The refer

ence range



                                                                 was not used to

 interpret



                                                                 this result as



                                                                 normal/abnormal

.

 

             VENOUS PO2, CORD (test              See_Comment                [Aut

omated message] The



             code = 3718290021)                                        system [facility] generated



                                                                 this result tra

nsmitted



                                                                 reference range

: 17 - 41



                                                                 mmHg. The refer

ence range



                                                                 was not used to

 interpret



                                                                 this result as



                                                                 normal/abnormal

.

 

             VENOUS BICARBONATE,              See_Comment                [Automa

tisha message] The



             CORD (test code =                                        system wh

ch generated



             6333781225)                                         this result tra

nsmitted



                                                                 reference range

: 12 - 29



                                                                 mEq/L. The refe

rence range



                                                                 was not used to

 interpret



                                                                 this result as



                                                                 normal/abnormal

.



[facility]





             Test Item    Value        Reference Range Interpretation Comments

 

             APPEARANCE (test code = Hazy         Clear        A            



             4275652096)                                         

 

             COLOR (test code = Yellow       Yellow                    



             3541575117)                                         

 

             PH (test code =              4.8-8.0                   



             5547191695)                                         

 

             SP GRAVITY (test code =              1.003-1.030               



             0000083892)                                         

 

             GLU U QUAL (test code = Normal       Normal                    



             8958543048)                                         

 

             BLOOD (test code = 1+           Negative     A            



             3352128430)                                         

 

             KETONES (test code = 80 mg/dL     Negative     A            



             2980923429)                                         

 

             PROTEIN (test code = 30 mg/dL     Negative     A            



             2887-8)                                             

 

             UROBILIN (test code = 4.0 mg/dL    Normal       A            



             0018952569)                                         

 

             BILIRUBIN (test code = Negative     Negative                  



             6132224524)                                         

 

             NITRITE (test code = Negative     Negative                  



             4187720688)                                         

 

             LEUK SALLY (test code = 250/uL       Negative     A            



             8921682178)                                         

 

             RBC/HPF (test code =              See_Comment  H             [Autom

ated message]



             0253189877)                                         The system [employer]



                                                                 generated this



                                                                 result transmit

tisha



                                                                 reference range

: 0 -



                                                                 3 HPF. The refe

rence



                                                                 range was not u

sed



                                                                 to interpret th

is



                                                                 result as



                                                                 normal/abnormal

.

 

             WBC/HPF (test code =              See_Comment  H             [Autom

ated message]



             4601256260)                                         The system [employer]



                                                                 generated this



                                                                 result transmit

tisha



                                                                 reference range

: 0 -



                                                                 5 HPF. The refe

rence



                                                                 range was not u

sed



                                                                 to interpret th

is



                                                                 result as



                                                                 normal/abnormal

.

 

             BACTERIA (test code = Negative     Negative                  



             0224741562)                                         

 

             MUCOUS (test code = Slight       Negative LPF A            



             7417984023)                                         

 

             SQ EPITH (test code =              See_Comment                [Auto

mated message]



             5391494030)                                         The system [employer]



                                                                 generated this



                                                                 result transmit

tisha



                                                                 reference range

: <=2



                                                                 HPF. The refere

nce



                                                                 range was not u

sed



                                                                 to interpret th

is



                                                                 result as



                                                                 normal/abnormal

.

 

             YEAST BUD (test code = <1           See_Comment                [Aut

omated message]



             5754163337)                                         The system [employer]



                                                                 generated this



                                                                 result transmit

tisha



                                                                 reference range

: <=1



                                                                 HPF. The refere

nce



                                                                 range was not u

sed



                                                                 to interpret th

is



                                                                 result as



                                                                 normal/abnormal

.

 

             Lab Interpretation (test Abnormal                               



             code = 04385-1)                                        



[facility] [id number] 02:58:00





             Test Item    Value        Reference Range Interpretation Comments

 

             URIC ACID (test code = 1243771560) 4.9 mg/dL    2.9-6              

       

 

             Lab Interpretation (test code = Normal                             

    



             29106-5)                                            



[facility] [id number] 02:58:00





             Test Item    Value        Reference Range Interpretation Comments

 

             CREATININE (test code 0.54 mg/dL   0.5-1.04                  



             = 1110040913)                                        

 

             eGFR Calculation              mL/min/1.73m2              



             (Non-)                                        



             (test code =                                        



             2440081960)                                         

 

             eGFR Calculation              mL/min/1.73m2              



             (African American)                                        



             (test code =                                        



             7014478994)                                         

 

             ALFONZO (test code = ALFONZO) Association of                           



                          Glomerular Filtration                           



                          Rate (GFR) and Staging                           



                          of Kidney                              



                          Disease*+--------------                           



                          ---------+-------------                           



                          --------+--------------                           



                          -----------+| GFR                           



                          (mL/min/1.73 m2)?| With                           



                          Kidney Damage?|?Without                           



                          Kidney                                 



                          Damage+----------------                           



                          -------+---------------                           



                          ------+----------------                           



                          ---------+|?>90?|?Stage                           



                          one?|?                                 



                          Normal?+---------------                           



                          --------+--------------                           



                          -------+---------------                           



                          ----------+|?60-89?|?St                           



                          age two?|? Decreased                           



                          GFR?                                   



                          +----------------------                           



                          -+---------------------                           



                          +----------------------                           



                          ---+|?30-59?|?Stage                           



                          three?|? Stage three?                           



                          +----------------------                           



                          -+---------------------                           



                          +----------------------                           



                          ---+|?15-29?|?Stage                           



                          four? |? Stage                           



                          four?+-----------------                           



                          ------+----------------                           



                          -----+-----------------                           



                          --------+|?<15 (or                           



                          dialysis)?|?Stage five?                           



                          |? Stage                               



                          five?+-----------------                           



                          ------+----------------                           



                          -----+-----------------                           



                          --------+*Each stage                           



                          assumes the associated                           



                          GFR level has been in                           



                          effect for at least                           



                          three months.?Stages 1                           



                          to 5, with or without                           



                          kidney disease,                           



                          indicate chronic kidney                           



                          disease.Notes:                           



                          Determination of stages                           



                          one and two (with eGFR                           



                          >59mL/min/1.73 m2)                           



                          requires estimation of                           



                          kidney damage for at                           



                          least three months as                           



                          defined by structural                           



                          or functional                           



                          abnormalities of the                           



                          kidney, manifested by                           



                          either:Pathological                           



                          abnormalities or                           



                          Markers of kidney                           



                          damage (including                           



                          abnormalities in the                           



                          composition of the                           



                          blood or urine or                           



                          abnormalities in                           



                          imaging tests).                           



[facility]2019 
02:58:00





             Test Item    Value        Reference Range Interpretation Comments

 

             AST(SGOT) (test code = 9112902072) 28 U/L       13-40              

       

 

             Lab Interpretation (test code = Normal                             

    



             52800-5)                                            



[facility]2019 
02:58:00





             Test Item    Value        Reference Range Interpretation Comments

 

             ALT(SGPT) (test code = 3508233013) 22 U/L       9-51               

       

 

             Lab Interpretation (test code = Normal                             

    



             55272-5)                                            



[facility] [id number] 02:58:00





             Test Item    Value        Reference Range Interpretation Comments

 

             LDH (test code = 2265613696) 597 U/L      300-600                  

 

 

             Lab Interpretation (test code = Normal                             

    



             47782-5)                                            



[facility] Urine [id number] 
02:24:00





             Test Item    Value        Reference Range Interpretation Comments

 

             T. PROT U (test code = 2888-6) 31 mg/dL                            

   

 

             CREAT U (test code = 3982194723) 132.3 mg/dL                       

     

 

             Protein/Creatinine Ratio Urine              0.0-2.0                

   



             (test code = 0949457527)                                        



[facility] [id number] 02:17:00





             Test Item    Value        Reference Range Interpretation Comments

 

             WBC (test code =              See_Comment                [Automated



             5590-2)                                             message] The sy

stem



                                                                 which generated



                                                                 this result



                                                                 transmitted



                                                                 reference range

:



                                                                 4.30 - 11.10



                                                                 10*3/?L. The



                                                                 reference range

 was



                                                                 not used to



                                                                 interpret this



                                                                 result as



                                                                 normal/abnormal

.

 

             RBC (test code =              See_Comment                [Automated



             789-8)                                              message] The sy

stem



                                                                 which generated



                                                                 this result



                                                                 transmitted



                                                                 reference range

:



                                                                 3.93 - 5.25



                                                                 10*6/?L. The



                                                                 reference range

 was



                                                                 not used to



                                                                 interpret this



                                                                 result as



                                                                 normal/abnormal

.

 

             HGB (test code = 9.4 g/dL     11.6-15      L            



             718-7)                                              

 

             HCT (test code = 33.1 %       35.7-45.2    L            



             4544-3)                                             

 

             MCV (test code = 81.1 fL      80.6-95.5                 



             787-2)                                              

 

             MCH (test code = 23.0 pg      25.9-32.8    L            



             785-6)                                              

 

             MCHC (test code = 28.4 g/dL    31.6-35.1    L            



             786-4)                                              

 

             RDW-SD (test code = 57.6 fL      39-49.9      H            



             58067-4)                                            

 

             RDW-CV (test code = 20.3 %       12-15.5      H            



             788-0)                                              

 

             PLT (test code =              See_Comment  L             [Automated



             777-3)                                              message] The sy

stem



                                                                 which generated



                                                                 this result



                                                                 transmitted



                                                                 reference range

:



                                                                 166 - 358 10*3/

?L.



                                                                 The reference r

luis



                                                                 was not used to



                                                                 interpret this



                                                                 result as



                                                                 normal/abnormal

.

 

             MPV (test code = 10.8 fL      9.5-12.9                  



             82868-1)                                            

 

             NRBC/100 WBC (test              See_Comment                [Automat

ed



             code = 4792934824)                                        message] 

The system



                                                                 which generated



                                                                 this result



                                                                 transmitted



                                                                 reference range

:



                                                                 0.0 - 10.0 /100



                                                                 WBCs. The refer

ence



                                                                 range was not u

sed



                                                                 to interpret th

is



                                                                 result as



                                                                 normal/abnormal

.

 

             NRBC x10^3 (test code <0.01        See_Comment                [Auto

mated



             = 4387382414)                                        message] The s

ystem



                                                                 which generated



                                                                 this result



                                                                 transmitted



                                                                 reference range

:



                                                                 10*3/?L. The



                                                                 reference range

 was



                                                                 not used to



                                                                 interpret this



                                                                 result as



                                                                 normal/abnormal

.

 

             GRAN MAT (NEUT) % 77.0 %                                 



             (test code = 770-8)                                        

 

             IMM GRAN % (test code 0.30 %                                 



             = 7388076565)                                        

 

             LYMPH % (test code = 14.7 %                                 



             736-9)                                              

 

             MONO % (test code = 7.2 %                                  



             5905-5)                                             

 

             EOS % (test code = 0.3 %                                  



             713-8)                                              

 

             BASO % (test code = 0.5 %                                  



             706-2)                                              

 

             GRAN MAT x10^3(ANC) 6.70 10*3/uL 1.88-7.09                 



             (test code =                                        



             7816223913)                                         

 

             IMM GRAN x10^3 (test 0.03 10*3/uL 0-0.06                    



             code = 3415171512)                                        

 

             LYMPH x10^3 (test code 1.28 10*3/uL 1.32-3.29    L            



             = 731-0)                                            

 

             MONO x10^3 (test code 0.63 10*3/uL 0.33-0.92                 



             = 742-7)                                            

 

             EOS x10^3 (test code = 0.03 10*3/uL 0.03-0.39                 



             711-2)                                              

 

             BASO x10^3 (test code 0.04 10*3/uL 0.01-0.07                 



             = 704-7)                                            

 

             Lab Interpretation Abnormal                               



             (test code = 45742-7)                                        



[facility] HospitalHepatitis B Surface [id number] 17:46:00





             Test Item    Value        Reference Range Interpretation Comments

 

             HBsAg Semi-Quantitative (test code =                               

         



             5195-3)                                             



[facility] HospitalType [facility] - ONCE [id number] 17:23:20





             Test Item    Value        Reference Range Interpretation Comments

 

             ABO & RH (test code O POSITIVE                             Performe

d at [facility]



             = 20)                                               Laboratory Serv

ices -



                                                                 [facility]

[address]



                                                                 [facility]

s



                                                                 [id number] Free:



                                                                 [phone]

A No.



                                                                 [id number]

 

             IAT (test code = Negative                               Performed a

t [facility]



             1185)                                               Laboratory Serv

ices -



                                                                 [facility]

[address]



                                                                 [facility]

s



                                                                 [address] Free:



                                                                 [phone]

A No.



                                                                 [id number]



[facility] HospitalCBC WITH [id number] 16:41:00





             Test Item    Value        Reference Range Interpretation Comments

 

             WBC (test code =              See_Comment                [Automated



             6690-2)                                             message] The sy

stem



                                                                 which generated



                                                                 this result



                                                                 transmitted



                                                                 reference range

:



                                                                 4.30 - 11.10



                                                                 10*3/?L. The



                                                                 reference range

 was



                                                                 not used to



                                                                 interpret this



                                                                 result as



                                                                 normal/abnormal

.

 

             RBC (test code =              See_Comment  L             [Automated



             789-8)                                              message] The sy

stem



                                                                 which generated



                                                                 this result



                                                                 transmitted



                                                                 reference range

:



                                                                 3.93 - 5.25



                                                                 10*6/?L. The



                                                                 reference range

 was



                                                                 not used to



                                                                 interpret this



                                                                 result as



                                                                 normal/abnormal

.

 

             HGB (test code = 8.3 g/dL     11.6-15      L            



             718-7)                                              

 

             HCT (test code = 27.8 %       35.7-45.2    L            



             4544-3)                                             

 

             MCV (test code = 78.8 fL      80.6-95.5    L            



             787-2)                                              

 

             MCH (test code = 23.5 pg      25.9-32.8    L            



             785-6)                                              

 

             MCHC (test code = 29.9 g/dL    31.6-35.1    L            



             786-4)                                              

 

             RDW-SD (test code = 55.9 fL      39-49.9      H            



             00238-0)                                            

 

             RDW-CV (test code = 19.9 %       12-15.5      H            



             788-0)                                              

 

             PLT (test code =              See_Comment  L             [Automated



             777-3)                                              message] The sy

stem



                                                                 which generated



                                                                 this result



                                                                 transmitted



                                                                 reference range

:



                                                                 166 - 358 10*3/

?L.



                                                                 The reference r

luis



                                                                 was not used to



                                                                 interpret this



                                                                 result as



                                                                 normal/abnormal

.

 

             MPV (test code = 10.4 fL      9.5-12.9                  



             16938-8)                                            

 

             NRBC/100 WBC (test              See_Comment                [Automat

ed



             code = 6697283521)                                        message] 

The system



                                                                 which generated



                                                                 this result



                                                                 transmitted



                                                                 reference range

:



                                                                 0.0 - 10.0 /100



                                                                 WBCs. The refer

ence



                                                                 range was not u

sed



                                                                 to interpret th

is



                                                                 result as



                                                                 normal/abnormal

.

 

             NRBC x10^3 (test code <0.01        See_Comment                [Auto

mated



             = 0171074021)                                        message] The s

ystem



                                                                 which generated



                                                                 this result



                                                                 transmitted



                                                                 reference range

:



                                                                 10*3/?L. The



                                                                 reference range

 was



                                                                 not used to



                                                                 interpret this



                                                                 result as



                                                                 normal/abnormal

.

 

             GRAN MAT (NEUT) % 65.9 %                                 



             (test code = 770-8)                                        

 

             IMM GRAN % (test code 0.50 %                                 



             = 6411586070)                                        

 

             LYMPH % (test code = 24.3 %                                 



             736-9)                                              

 

             MONO % (test code = 8.1 %                                  



             5905-5)                                             

 

             EOS % (test code = 0.9 %                                  



             713-8)                                              

 

             BASO % (test code = 0.3 %                                  



             706-2)                                              

 

             GRAN MAT x10^3(ANC) 3.83 10*3/uL 1.88-7.09                 



             (test code =                                        



             7502890391)                                         

 

             IMM GRAN x10^3 (test 0.03 10*3/uL 0-0.06                    



             code = 7150502259)                                        

 

             LYMPH x10^3 (test code 1.41 10*3/uL 1.32-3.29                 



             = 731-0)                                            

 

             MONO x10^3 (test code 0.47 10*3/uL 0.33-0.92                 



             = 742-7)                                            

 

             EOS x10^3 (test code = 0.05 10*3/uL 0.03-0.39                 



             711-2)                                              

 

             BASO x10^3 (test code <0.03        0.01-0.07                 



             = 704-7)                                            

 

             Lab Interpretation Abnormal                               



             (test code = 99316-3)                                        



[facility]

## 2023-09-02 NOTE — EDPHYS
Physician Documentation                                                                           

 [facility]                                                                 

Name: Lorena Rice                                                                                

Age: 28 yrs                                                                                       

Sex: Female                                                                                       

: 1995                                                                                   

MRN: [id number]                                                                                   

Arrival Date: 2023                                                                          

Time: 01:26                                                                                       

Account#: [id number]                                                                            

Bed 16                                                                                            

Private MD:                                                                                       

ED Physician Brendon Kerr                                                                      

HPI:                                                                                              

                                                                                             

03:47 This 28 yrs old Black Female presents to ER via Ambulatory with complaints of Abdominal antonino 

      Pain.                                                                                       

03:47 The patient presents with abdominal pain in the upper abdomen, abdominal distention in  antonino 

      the upper abdomen. Onset: The symptoms/episode began/occurred just prior to arrival,        

      this morning. The symptoms do not radiate. Associated signs and symptoms: none. The         

      symptoms are described as constant, crampy. Modifying factors: The symptoms are             

      alleviated by nothing, the symptoms are aggravated by nothing. Severity of pain: At its     

      worst the pain was moderate in the emergency department the pain has resolved. The          

      patient has not experienced similar symptoms in the past.                                   

                                                                                                  

Historical:                                                                                       

- Allergies:                                                                                      

01:41 No Known Allergies;                                                                     vc1 

- Home Meds:                                                                                      

01:41 None [Active];                                                                          vc1 

- PMHx:                                                                                           

01:41 None;                                                                                   vc1 

- PSHx:                                                                                           

01:41  section; tubal ligation;                                                       vc1 

                                                                                                  

- Immunization history:: Client reports receiving the Richy \T\ Richy single-dose             

  vaccine.                                                                                        

- Social history:: Smoking status: Reported history of juuling and/or vaping.                     

                                                                                                  

                                                                                                  

ROS:                                                                                              

03:49 Constitutional: Negative for fever, chills, and weight loss, Eyes: Negative for injury, antonino 

      pain, redness, and discharge, ENT: Negative for injury, pain, and discharge, Neck:          

      Negative for injury, pain, and swelling, Cardiovascular: Negative for chest pain,           

      palpitations, and edema, Respiratory: Negative for shortness of breath, cough,              

      wheezing, and pleuritic chest pain, Back: Negative for injury and pain, : Negative        

      for injury, bleeding, discharge, and swelling, MS/Extremity: Negative for injury and        

      deformity, Skin: Negative for injury, rash, and discoloration, Neuro: Negative for          

      headache, weakness, numbness, tingling, and seizure, Psych: Negative for depression,        

      anxiety, suicide ideation, homicidal ideation, and hallucinations, Allergy/Immunology:      

      Negative for hives, rash, and allergies, Endocrine: Negative for neck swelling,             

      polydipsia, polyuria, polyphagia, and marked weight changes, Hematologic/Lymphatic:         

      Negative for swollen nodes, abnormal bleeding, and unusual bruising.                        

03:49 Abdomen/GI: Positive for abdominal pain, of the epigastric area, right upper quadrant       

      and left upper quadrant.                                                                    

                                                                                                  

Exam:                                                                                             

03:49 Constitutional:  This is a well developed, well nourished patient who is awake, alert,  antonino 

      and in no acute distress. Head/Face:  Normocephalic, atraumatic. Eyes:  Pupils equal        

      round and reactive to light, extra-ocular motions intact.  Lids and lashes normal.          

      Conjunctiva and sclera are non-icteric and not injected.  Cornea within normal limits.      

      Periorbital areas with no swelling, redness, or edema. ENT:  Nares patent. No nasal         

      discharge, no septal abnormalities noted.  Tympanic membranes are normal and external       

      auditory canals are clear.  Oropharynx with no redness, swelling, or masses, exudates,      

      or evidence of obstruction, uvula midline.  Mucous membranes moist. Neck:  Trachea          

      midline, no thyromegaly or masses palpated, and no cervical lymphadenopathy.  Supple,       

      full range of motion without nuchal rigidity, or vertebral point tenderness.  No            

      Meningismus. Chest/axilla:  Normal chest wall appearance and motion.  Nontender with no     

      deformity.  No lesions are appreciated. Cardiovascular:  Regular rate and rhythm with a     

      normal S1 and S2.  No gallops, murmurs, or rubs.  Normal PMI, no JVD.  No pulse             

      deficits. Respiratory:  Lungs have equal breath sounds bilaterally, clear to                

      auscultation and percussion.  No rales, rhonchi or wheezes noted.  No increased work of     

      breathing, no retractions or nasal flaring. Back:  No spinal tenderness.  No                

      costovertebral tenderness.  Full range of motion. Skin:  Warm, dry with normal turgor.      

      Normal color with no rashes, no lesions, and no evidence of cellulitis. MS/ Extremity:      

      Pulses equal, no cyanosis.  Neurovascular intact.  Full, normal range of motion. Neuro:     

       Awake and alert, GCS 15, oriented to person, place, time, and situation.  Cranial          

      nerves II-XII grossly intact.  Motor strength 5/5 in all extremities.  Sensory grossly      

      intact.  Cerebellar exam normal.  Normal gait. Psych:  Awake, alert, with orientation       

      to person, place and time.  Behavior, mood, and affect are within normal limits.            

03:49 Abdomen/GI: Inspection: abdomen appears normal, Bowel sounds: normal, Palpation:            

      moderate abdominal tenderness, in the epigastric area, right upper quadrant and left        

      upper quadrant.                                                                             

                                                                                                  

Vital Signs:                                                                                      

01:40 Weight 107.05 kg; Height 5 ft. 8 in. ; Pain 7/10;                                       vc1 

01:45  / 90; Pulse 89; Resp 20; Temp 98.2; Pulse Ox 100% ;                              vc1 

02:00  / 73; Pulse 64; Resp 18 S; Pulse Ox 100% on R/A;                                 ha1 

03:30  / 73; Pulse 62; Resp 18 S; Pulse Ox 100% on R/A;                                 ha1 

04:00  / 71; Pulse 64; Resp 17 S; Pulse Ox 100% on R/A;                                 ha1 

04:30  / 75; Pulse 68; Resp 18 S; Pulse Ox 100% on R/A;                                 ha1 

05:28  / 96; Pulse 70; Resp 18 S; Pulse Ox 100% on R/A;                                 ha1 

01:40 Body Mass Index 35.88 (107.05 kg, 172.72 cm)                                            vc1 

01:40 Pain Scale: Adult                                                                       vc1 

                                                                                                  

MDM:                                                                                              

02:24 Patient medically screened.                                                             snw 

02:27 Patient medically screened.                                                             antnoino 

03:50 Differential diagnosis: bowel obstruction, cholecystitis, Cholelithiasis,               antonino 

      diverticulitis, gastritis, non-specific abd pain. Data reviewed: vital signs, nurses        

      notes, lab test result(s), radiologic studies, CT scan, ultrasound. Consideration of        

      Admission/Observation Escalation of care including admission/observation considered. I      

      considered the following discharge prescriptions or medication management in the            

      emergency department Medications were administered in the Emergency Department. See         

      MAR. Independent interpretation of the following test(s) in the Emergency Department        

      Radiology Department Ultrasound: My interpretation is ABD PAIN. Test considered but Not     

      performed: EKG: NO EKG. Care significantly affected by the following chronic                

      conditions: NONE.                                                                           

                                                                                                  

                                                                                             

01:36 Order name: Urine W/Microscopic (UAM); Complete Time: 04:44                             snw 

                                                                                             

01:36 Order name: PREGU; Complete Time: 04:44                                                 snw 

                                                                                             

02:25 Order name: CBC with Diff                                                               [facility] 

                                                                                             

02:25 Order name: CMP; Complete Time: 04:53                                                   antonino 

                                                                                             

02:25 Order name: Lipase; Complete Time: 04:54                                                [facility] 

                                                                                             

05:04 Order name: CBC Smear Scan                                                              [facility]

                                                                                             

05:24 Order name: CREATININE WHOLE BLOOD                                                      [facility]

                                                                                             

02:14 Order name: Chest Single View XRAY                                                      snw 

                                                                                             

02:25 Order name: CT Abd/Pelvis - IV Contrast Only                                            [facility] 

                                                                                             

02:25 Order name: US Abdomen Limited                                                          [facility] 

                                                                                             

02:25 Order name: IV Saline Lock; Complete Time: 04:24                                        [facility] 

                                                                                             

02:25 Order name: Labs collected and sent; Complete Time: 04:24                               [facility] 

                                                                                                  

Administered Medications:                                                                         

04:12 Drug: NS 0.9% IV 1000 ml Route: IV; Rate: 1 bolus; Site: left antecubital;              ha1 

04:12 Drug: Famotidine IVP 20 mg Route: IVP; Site: left antecubital;                          ha1 

04:45 Follow up: Response: No adverse reaction; Nausea is decreased                           ha1 

04:14 Drug: Ondansetron IVP 4 mg Route: IVP; Site: left antecubital;                          ha1 

04:45 Follow up: Response: No adverse reaction; Nausea is decreased                           ha1 

04:52 Drug: Sucralfate PO 1 grams Route: PO;                                                  ha1 

05:03 Drug: Ondansetron IVP 4 mg Route: IVP; Site: right antecubital;                         ha1 

05:24 Follow up: Response: No adverse reaction                                                ha1 

05:06 Drug: fentaNYL (PF) IVP 50 mcg Route: IVP; Site: right antecubital;                     ha1 

05:24 Follow up: Response: No adverse reaction; Pain is decreased; RASS: Alert and Calm (0)   ha1 

05:09 Drug: Piperacillin-Tazobactam IVPB 3.375 grams Route: IVPB; Infused Over: 60 mins;      ha1 

      Site: right antecubital;                                                                    

                                                                                                  

                                                                                                  

Disposition Summary:                                                                              

23 04:56                                                                                    

Hospitalization Ordered                                                                           

      Hospitalization Status: Observation                                                     antonino 

      Provider: Ceferino Garcia cha 

      Location: Telemetry/MedSurg (observation)                                               antonino 

      Condition: Stable                                                                       antonino 

      Problem: new                                                                            antonino 

      Symptoms: have improved                                                                 antonino 

      Bed/Room Type: Standard                                                                 antonino 

      Room Assignment: 230(23 05:18)                                                    cg  

      Diagnosis                                                                                   

        - Other cholelithiasis without obstruction                                            antonino 

        - Cholecystitis, unspecified                                                          antonino 

      Forms:                                                                                      

        - Medication Reconciliation Form                                                      antonino 

        - SBAR form                                                                           antonino 

        - Leadership Thank You Letter                                                         [facility] 

Signatures:                                                                                       

Dispatcher [employer]                           Brendon Brady MD MD cha Waters, Shelly, FNP-C                   FNP-Sushantw                                                  

Fabienne Mas RN                       RN   cg                                                   

Rukhsana Price RN RN   vc1                                                  

Sally Vega RN                        RN   ha1                                                  

                                                                                                  

Corrections: (The following items were deleted from the chart)                                    

01:42 01:41 PSHx: None; vc1                                                                   vc1 

05:18 04:56 antonino                                                                                 

                                                                                                  

**************************************************************************************************

## 2023-09-03 LAB
ALBUMIN SERPL BCP-MCNC: 3 G/DL (ref 3.4–5)
ALP SERPL-CCNC: 65 U/L (ref 45–117)
ALT SERPL W P-5'-P-CCNC: 24 U/L (ref 13–56)
AST SERPL W P-5'-P-CCNC: 25 U/L (ref 15–37)
BILIRUB INDIRECT SERPL-MCNC: (no result) MG/DL (ref 0.2–0.8)
BUN BLD-MCNC: 4 MG/DL (ref 7–18)
GLUCOSE SERPLBLD-MCNC: 154 MG/DL (ref 74–106)
HCT VFR BLD CALC: 28.2 % (ref 36–45)
LIPASE SERPL-CCNC: 30 U/L (ref 13–75)
LYMPHOCYTES # SPEC AUTO: 0.5 K/UL (ref 0.7–4.9)
MCV RBC: 68.7 FL (ref 80–100)
PMV BLD: 8.1 FL (ref 7.6–11.3)
POTASSIUM SERPL-SCNC: 3.9 MEQ/L (ref 3.5–5.1)
RBC # BLD: 4.11 M/UL (ref 3.86–4.86)
WBC # BLD AUTO: 7.3 THOU/UL (ref 4.3–10.9)

## 2023-09-03 RX ADMIN — MORPHINE SULFATE PRN MG: 4 INJECTION, SOLUTION INTRAMUSCULAR; INTRAVENOUS at 22:15

## 2023-09-03 RX ADMIN — SODIUM CHLORIDE SCH MLS: 9 INJECTION, SOLUTION INTRAVENOUS at 05:38

## 2023-09-03 RX ADMIN — FAMOTIDINE SCH MG: 10 INJECTION, SOLUTION INTRAVENOUS at 19:55

## 2023-09-03 RX ADMIN — SODIUM CHLORIDE SCH MLS: 9 INJECTION, SOLUTION INTRAVENOUS at 19:55

## 2023-09-03 RX ADMIN — DEXTROSE AND SODIUM CHLORIDE SCH MLS: 5; .45 INJECTION, SOLUTION INTRAVENOUS at 13:35

## 2023-09-03 RX ADMIN — HYDROCODONE BITARTRATE AND ACETAMINOPHEN PRN TAB: 7.5; 325 TABLET ORAL at 03:42

## 2023-09-03 RX ADMIN — HYDROCODONE BITARTRATE AND ACETAMINOPHEN PRN TAB: 7.5; 325 TABLET ORAL at 13:33

## 2023-09-03 RX ADMIN — DEXTROSE AND SODIUM CHLORIDE SCH: 5; .45 INJECTION, SOLUTION INTRAVENOUS at 22:19

## 2023-09-03 RX ADMIN — HYDROCODONE BITARTRATE AND ACETAMINOPHEN PRN TAB: 7.5; 325 TABLET ORAL at 08:58

## 2023-09-03 RX ADMIN — FAMOTIDINE SCH MG: 10 INJECTION, SOLUTION INTRAVENOUS at 09:01

## 2023-09-03 RX ADMIN — SODIUM CHLORIDE SCH MLS: 9 INJECTION, SOLUTION INTRAVENOUS at 12:39

## 2023-09-03 RX ADMIN — MORPHINE SULFATE PRN MG: 4 INJECTION, SOLUTION INTRAMUSCULAR; INTRAVENOUS at 05:38

## 2023-09-03 RX ADMIN — HYDROCODONE BITARTRATE AND ACETAMINOPHEN PRN TAB: 7.5; 325 TABLET ORAL at 19:55

## 2023-09-03 RX ADMIN — DEXTROSE AND SODIUM CHLORIDE SCH MLS: 5; .45 INJECTION, SOLUTION INTRAVENOUS at 05:38

## 2023-09-04 VITALS — TEMPERATURE: 98 F | SYSTOLIC BLOOD PRESSURE: 120 MMHG | DIASTOLIC BLOOD PRESSURE: 74 MMHG

## 2023-09-04 RX ADMIN — HYDROCODONE BITARTRATE AND ACETAMINOPHEN PRN TAB: 7.5; 325 TABLET ORAL at 09:45

## 2023-09-04 RX ADMIN — SODIUM CHLORIDE SCH MLS: 9 INJECTION, SOLUTION INTRAVENOUS at 06:00

## 2023-09-04 RX ADMIN — FAMOTIDINE SCH MG: 10 INJECTION, SOLUTION INTRAVENOUS at 09:45

## 2023-09-04 RX ADMIN — HYDROCODONE BITARTRATE AND ACETAMINOPHEN PRN TAB: 7.5; 325 TABLET ORAL at 01:35

## 2023-09-04 RX ADMIN — MORPHINE SULFATE PRN MG: 4 INJECTION, SOLUTION INTRAMUSCULAR; INTRAVENOUS at 06:02

## 2023-09-04 NOTE — P.PN
Date of Service: 09/04/23


S: Patient feels better today, pain is under much better control.  Able to up 

and ambulate.  Has been using her incentive spirometer.  Voiding well on her 

own.





O: Incisions are clean, dressings intact





A: Surgically stable





P: Patient's pain is better controlled today on oral medication, will discharge 

her home.  She will see me next week in my office.  She is to call for an 

appointment.

## 2023-09-04 NOTE — P.DS
Admission Date: 09/03/23


Discharge Date: 09/04/23


Disposition: ROUTINE DISCHARGE


Discharge Condition: GOOD


Reason for Admission: Acute postoperative abdominal pain


Procedures: 





Laparoscopic cholecystectomy with cholangiogram


Brief History of Present Illness: 





Patient had been experiencing right upper quadrant abdominal pain radiating into

her back at home.  She came to the emergency room for evaluation and treatment.


Hospital Course: 





Patient was evaluated in the emergency room.  It was found that she had 

cholecystitis.  She was brought to the operating room where she underwent a 

laparoscopic cholecystectomy with a normal intraoperative cholangiogram.  The 

patient tolerated the procedure well and was admitted for postoperative pain 

control.  The pain medicine finally started working for the patient she is able 

to get up and move around.  She has a number of children at home and feels she 

is now able to take care of them a whole lot better than she was yesterday.  She

is deemed fit for discharge.


Vital Signs/Physical Exam: 














Temp Pulse Resp BP Pulse Ox


 


 98.0 F   63   16   120/74   100 


 


 09/04/23 08:00  09/04/23 08:00  09/04/23 10:45  09/04/23 08:00  09/04/23 10:45








Laboratory Data at Discharge: 














WBC  7.30 thou/uL (4.3-10.9)   09/03/23  06:50    


 


Hgb  9.1 g/dL (12.0-15.0)  L  09/03/23  06:50    


 


Hct  28.2 % (36.0-45.0)  L  09/03/23  06:50    


 


Plt Count  253 thou/uL (152-406)   09/03/23  06:50    


 


Sodium  137 mEq/L (136-145)   09/03/23  06:50    


 


Potassium  3.9 mEq/L (3.5-5.1)   09/03/23  06:50    


 


BUN  4 mg/dL (7-18)  L  09/03/23  06:50    


 


Creatinine  0.95 mg/dL (0.55-1.02)   09/03/23  06:50    


 


Glucose  154 mg/dL ()  H  09/03/23  06:50    


 


Total Bilirubin  0.3 mg/dL (0.2-1.0)   09/03/23  06:50    


 


AST  25 U/L (15-37)   09/03/23  06:50    


 


ALT  24 U/L (13-56)   09/03/23  06:50    


 


Alkaline Phosphatase  65 U/L ()   09/03/23  06:50    


 


Lipase  30 U/L (13-75)   09/03/23  06:50    








Physician Discharge Instructions: 


Pain medicine as directed.  Milk of magnesia as needed for constipation.  You 

may shower.  Change the surgical dressings as needed.  Any questions or 

problems, contact my office.  You may shower.  Make sure you are up walking 

around.  Call tomorrow for an appointment for next week.


Diet: Regular


Activity: Ad farhat


Followup: 


Ceferino Garcia MD [ACTIVE - CAN ADMIT] -  (Call to schedule appointment)

## 2023-09-21 ENCOUNTER — HOSPITAL ENCOUNTER (EMERGENCY)
Dept: HOSPITAL 97 - ER | Age: 28
Discharge: HOME | End: 2023-09-21
Payer: SELF-PAY

## 2023-09-21 VITALS — OXYGEN SATURATION: 100 % | TEMPERATURE: 97.9 F

## 2023-09-21 DIAGNOSIS — J02.9: Primary | ICD-10-CM

## 2023-09-21 PROCEDURE — 96372 THER/PROPH/DIAG INJ SC/IM: CPT

## 2023-09-21 PROCEDURE — 99284 EMERGENCY DEPT VISIT MOD MDM: CPT

## 2023-09-21 NOTE — EDPHYS
Physician Documentation                                                                           

 [facility]                                                                 

Name: Lorena Rice                                                                                

Age: 28 yrs                                                                                       

Sex: Female                                                                                       

: 1995                                                                                   

MRN: [id number]                                                                                   

Arrival Date: 2023                                                                          

Time: 08:16                                                                                       

Account#: [id number]                                                                            

Bed IW1                                                                                           

Private MD:                                                                                       

ED Physician Arvind Dooley                                                                     

HPI:                                                                                              

                                                                                             

08:34 This 28 yrs old Black Female presents to ER via Ambulatory with complaints of Throat    snw 

      Swelling-Tonsils.                                                                           

08:34 The patient presents with pain, redness. The problem is located in the uvula, left      snw 

      aspect of posterior pharynx and right aspect of posterior pharynx. Onset: The               

      symptoms/episode began/occurred acutely, 2 day(s) ago, and became worse. Duration: The      

      symptoms are continuous. Associated signs and symptoms: Pertinent positives: dysphagia,     

      pain, redness in area, swelling. Severity of symptoms: At their worst the symptoms were     

      moderate. The patient has not experienced similar symptoms in the past. It is unknown       

      whether or not the patient has recently seen a physician.                                   

                                                                                                  

OB/GYN:                                                                                           

08:25 LMP 2023, Pregnancy unknown                                                         ld1 

                                                                                                  

Historical:                                                                                       

- Allergies:                                                                                      

08:25 No Known Allergies;                                                                     ld1 

- PMHx:                                                                                           

08:25 None;                                                                                   ld1 

- PSHx:                                                                                           

08:25  section; tubal ligation;                                                       ld1 

                                                                                                  

- Immunization history:: Adult Immunizations up to date.                                          

- Social history:: Smoking status: Patient denies any tobacco usage or history of.                

                                                                                                  

                                                                                                  

ROS:                                                                                              

08:34 Constitutional: Negative for fever, chills, and weight loss, Eyes: Negative for injury, snw 

      pain, redness, and discharge,                                                               

08:34 Cardiovascular: Negative for chest pain, palpitations, and edema, Respiratory: Negative     

      for shortness of breath, cough, wheezing, and pleuritic chest pain, Abdomen/GI:             

      Negative for abdominal pain, nausea, vomiting, diarrhea, and constipation, Back:            

      Negative for injury and pain, : Negative for injury, bleeding, discharge, and             

      swelling, MS/Extremity: Negative for injury and deformity, Skin: Negative for injury,       

      rash, and discoloration, Neuro: Negative for headache, weakness, numbness, tingling,        

      and seizure, Psych: Negative for depression, anxiety, suicide ideation, homicidal           

      ideation, and hallucinations,                                                               

08:34 ENT: Positive for sore throat,                                                              

08:34 Neck: Positive for swollen nodes,                                                           

                                                                                                  

Exam:                                                                                             

08:34 Constitutional:  This is a well developed, well nourished patient who is awake, alert,  snw 

      and in no acute distress. Head/Face:  Normocephalic, atraumatic. Eyes:  Pupils equal        

      round and reactive to light, extra-ocular motions intact.  Lids and lashes normal.          

      Conjunctiva and sclera are non-icteric and not injected.  Cornea within normal limits.      

      Periorbital areas with no swelling, redness, or edema.                                      

08:34 Chest/axilla:  Normal chest wall appearance and motion.  Nontender with no deformity.       

      No lesions are appreciated. Cardiovascular:  Regular rate and rhythm with a normal S1       

      and S2.  No gallops, murmurs, or rubs.  Normal PMI, no JVD.  No pulse deficits.             

      Respiratory:  Lungs have equal breath sounds bilaterally, clear to auscultation and         

      percussion.  No rales, rhonchi or wheezes noted.  No increased work of breathing, no        

      retractions or nasal flaring. Abdomen/GI:  Soft, non-tender, with normal bowel sounds.      

      No distension or tympany.  No guarding or rebound.  No evidence of tenderness               

      throughout. Back:  No spinal tenderness.  No costovertebral tenderness.  Full range of      

      motion. Skin:  Warm, dry with normal turgor.  Normal color with no rashes, no lesions,      

      and no evidence of cellulitis. MS/ Extremity:  Pulses equal, no cyanosis.                   

      Neurovascular intact.  Full, normal range of motion. Neuro:  Awake and alert, GCS 15,       

      oriented to person, place, time, and situation.  Cranial nerves II-XII grossly intact.      

      Motor strength 5/5 in all extremities.  Sensory grossly intact.  Cerebellar exam            

      normal.  Normal gait. Psych:  Awake, alert, with orientation to person, place and time.     

       Behavior, mood, and affect are within normal limits.                                       

08:34 ENT: External ear(s): are unremarkable, Ear canal(s): are normal, TM's: are normal,         

      Nose: is normal, Mouth: is normal, Posterior pharynx: erythema, that is moderate,           

      Dental exam: normal,                                                                        

08:34 Neck: Lymph nodes: lymphadenopathy is appreciated, anterior cervical nodes,                 

                                                                                                  

Vital Signs:                                                                                      

08:24 Pulse 101; Resp 18; Temp 97.9(O); Pulse Ox 100% on R/A; Weight 116.12 kg; Height 5 ft.  ld1 

      8 in. ; Pain 8/10;                                                                          

08:24 Body Mass Index 38.92 (116.12 kg, 172.72 cm)                                            ld1 

08:24 Pain Scale: Adult                                                                       ld1 

                                                                                                  

MDM:                                                                                              

08:28 Patient medically screened.                                                             snw 

08:36 Differential diagnosis: pharyngitis, strep, mono. Data reviewed: vital signs, nurses    snw 

      notes. I considered the following discharge prescriptions or medication management in       

      the emergency department Medications were administered in the Emergency Department. See     

      MAR. Counseling: I had a detailed discussion with the patient and/or guardian regarding     

      the historical points, exam findings, and any diagnostic results supporting the             

      discharge/admit diagnosis, the need for outpatient follow up, for definitive care, to       

      return to the emergency department if symptoms worsen or persist or if there are any        

      questions or concerns that arise at home. Special discussion: Based on the history and      

      exam findings, there is no indication for further emergent testing or inpatient             

      evaluation. I discussed with the patient/guardian the need to see the primary care          

      provider for further evaluation of the symptoms.                                            

                                                                                                  

Administered Medications:                                                                         

08:41 Drug: Lortab PO Liquid 15 ml PO once Route: PO;                                         ld1 

08:41 Drug: Rocephin (cefTRIAXone) IM 1 grams IM once Route: IM; Site: right deltoid;         ld1 

08:41 Drug: predniSONE PO 40 mg PO once Route: PO;                                            ld1 

08:41 Drug: Famotidine PO 20 mg PO once Route: PO;                                            ld1 

                                                                                                  

                                                                                                  

Disposition:                                                                                      

15:53 Co-signature as Attending Physician, Arvind Dooley MD I agree with the assessment and cp3 

      plan of care.                                                                               

                                                                                                  

Disposition Summary:                                                                              

23 08:28                                                                                    

Discharge Ordered                                                                                 

 Notes:       Location: Home                                                                        
  snw

      Condition: Stable                                                                       snw 

      Diagnosis                                                                                   

        - Acute pharyngitis, unspecified                                                      snw 

      Followup:                                                                               snw 

        - With: Emergency Department                                                               

        - When: As needed                                                                          

        - Reason: Worsening of condition                                                           

      Followup:                                                                               snw 

        - With: Private Physician                                                                  

        - When: 2 - 3 days                                                                         

        - Reason: Recheck today's complaints, Continuance of care, Re-evaluation by your           

      physician                                                                                   

      Discharge Instructions:                                                                     

        - Discharge Summary Sheet                                                             snw 

        - Pharyngitis                                                                         snw 

        - Rehydration, Adult                                                                  snw 

      Forms:                                                                                      

        - Work release form                                                                   snw 

        - Medication Reconciliation Form                                                      snw 

        - Thank You Letter                                                                    snw 

        - Antibiotic Education                                                                snw 

        - Prescription Opioid Use                                                             snw 

        - Patient Portal Instructions                                                         snw 

        - Leadership Thank You Letter                                                         snw 

      Prescriptions:                                                                              

        - Prednisone 20 mg Oral Tablet                                                             

            - take 2 tablets ORAL route once daily for 5 days; 10 tablet; Refills: 0, Product snw 

      Selection Permitted                                                                         

        - Zithromax 500 mg Oral Tablet                                                             

            - take 1 tablet ORAL route once daily for 5 days; 5 tablet; Refills: 0, Product   snw 

      Selection Permitted                                                                         

Signatures:                                                                                       

Estefani Ma, PAP-C                   FNP-Ssuhantw                                                  

Arvind Dooley MD MD   cp3                                                  

Niru Grigsby RN                        RN   ld1                                                  

                                                                                                  

**************************************************************************************************

## 2023-09-21 NOTE — XMS REPORT
Continuity of Care Document

                          Created on:2023



Patient:MARIELA REDD

Sex:Female

:1995

External Reference #:[id number]





Demographics







                          Address                   [address]  



                                                    [place] [address]

 

                          Home Phone                [phone]

 

                          Work Phone                [phone]

 

                          Mobile Phone              [phone] )

 

                          Email Address             [email or web address]

 

                          Preferred Language        English

 

                          Marital Status            Unknown

 

                          Episcopalian Affiliation     Unknown

 

                          Race                      Unknown

 

                          Additional Race(s)        Unavailable



                                                    Black or 

 

                          Ethnic Group              Unknown









Author







                          Organization              [facility]

t

 

                          Address                   [address]



                                                    [place] [address]

 

                          Phone                     [phone]









Support







                Name            Relationship    Address         Phone

 

                CECILIA VALDES               [address]  [place]

[place]



                                                [place] [address] 

 

                DEREK ROBLES               [address]  [address]

able



                                                         



                                                [place] [address] 

 

                Derek Arthur Sibling         [address] [phone]

[phone]



                                                         



                                                [place] [address] 

 

                Cecilia Deleon Mother          [address]  [phone]





                                                [place] [address] 









Care Team Providers







                    Name                Role                Phone

 

                    PCP, PATIENT DOES NOT HAVE A Primary Care Physician UnavailCHRIS Busch     Attending Clinician Unavailable

 

                    Chris Narvaez DO  Attending Clinician [phone]

 

                    SLOAN NELSON    Attending Clinician Unavailable

 

                    Sloan Nelson MD Attending Clinician [phone]

 

                    PORSCHE POWER     Attending Clinician Unavailable

 

                    PORSCHE Rodriges Attending Clinician [phone]

 

                    Tom Bustillo MD  Attending Clinician [phone]

 

                    Doctor Unassigned, No Name Attending Clinician Unavailable

 

                    CYNTHIA FITCH    Attending Clinician Unavailable

 

                    Cynthia Cardenas Attending Clinician [phone]

 

                    BHARTI FUENTES     Attending Clinician Unavailable

 

                    Bharti Fuentes MD  Attending Clinician [phone]

 

                    Fabienne Steele RN     Attending Clinician Unavailable

 

                    Cristobal Lopez Attending Clinician [phone]

 

                    SABINA AVILEZ   Attending Clinician Unavailable

 

                    Tess EMERSON, Jasmin KERR Attending Clinician [phone]

[phone]

 

                    [facility] Attending Clinician Unavailable

 

                    Prem Batista MD  Attending Clinician [phone]

 

                    Karen EMERSON, Katy Young Attending Clinician [phone]

 

                    Rahul FNP, Crystal R Attending Clinician [phone]

 

                    Visit, Stiven-Rmchp Nurse Attending Clinician Unavailable

 

                    Marquis WHCNP, Harleen C Attending Clinician [phone]

[phone]

 

                    Wilton EMERSON, Shanell   Attending Clinician [phone]

 

                    Tommy EMERSON, Carlos Eduardo   Attending Clinician [phone]

 

                    CHRIS NARVAEZ     Admitting Clinician Unavailable

 

                    Tom Bustillo MD  Admitting Clinician [phone]

 

                    CYNTHIA FITCH    Admitting Clinician Unavailable

 

                    BHARTI FUENTES     Admitting Clinician Unavailable

 

                    Prem Batista MD  Admitting Clinician [phone]

 

                    Shanell Núñez MD   Admitting Clinician [phone]









Payers







           Payer Name Policy Type Policy Number Effective Date Expiration Date REYES MELENDREZ            [id number]  2019            



           HEALTH                           00:00:00              







Problems







       Condition Condition Condition Status Onset  Resolution Last   Treating Co

mments 

Source



       Name   Details Category        Date   Date   Treatment Clinician        



                                                 Date                 

 

       Request Request Disease Active                       Overview: Univ

ers



       for    for                  8-15                        Formattin ity of



       sterilizat sterilizat               00:00:                      g of this

 [place]



       ion    ion                  00                          note   Medical



                                                               might be Branch



                                                               different 



                                                               from the 



                                                               original. 



                                                               Added  



                                                               automatic 



                                                               ally from 



                                                               request 



                                                               for    



                                                               surgery 



                                                               [id number] 

 

       Postpartum Postpartum Disease Active                              U

nivers



       care and care and               8-14                               ity of



       examinatio examinatio               00:00:                             [facility]

[place]



       n      n                    00                                 Medical



       immediatel immediatel                                                  Br

anch



       y after y after                                                  



       delivery delivery                                                  

 

       38 weeks 38 weeks Disease Active                              Unive

rs



       gestation gestation               7-26                               ity 

of



       of     of                   00:00:                             [place]



       pregnancy pregnancy               00                                 Medi

eliza



                                                                      Branch

 

       S/P    S/P    Disease Active                              Univers



                       7-26                               ity of



       section section               00:00:                             [place]



                                   00                                 Medical



                                                                      Branch

 

       APH    APH    Disease Active                              Univers



       (antepartu (antepartu                                              it

y of



       m      m                    00:00:                             [place]



       hemorrhage hemorrhage               00                                 Me

dical



       ), third ), third                                                  Branch



       trimester trimester                                                  

 

       Decreased Decreased Disease Active                              Uni

vers



       fetal  fetal                                               ity of



       movements, movements,               00:00:                             Te

[place]



       third  third                00                                 Medical



       trimester, trimester,                                                  Br

anch



       not    not                                                     



       applicable applicable                                                  



       or     or                                                      



       unspecifie unspecifie                                                  



       d      d                                                       

 

       Susceptibl Susceptibl Disease Active                              U

nivers



       e to   e to                                                ity of



       Varicella Varicella               00:00:                             [place]

[place]



       (non-immun (non-immun               00                                 Me

dical



       e),    e),                                                     Branch



       currently currently                                                  



       pregnant pregnant                                                  



       in third in third                                                  



       trimester trimester                                                  

 

       BMI    BMI    Disease Active                              Univers



       39.0-39.9, 39.0-39.9,                                              it

y of



       adult  adult                00:00:                             [place]



                                   00                                 Medical



                                                                      Branch

 

       Limited Limited Disease Active                              Univers



       prenatal prenatal                                              ity of



       care in care in               00:00:                             [place]



       third  third                00                                 Medical



       trimester trimester                                                  Bran

ch

 

       Multiparit Multiparit Disease Active                              U

nivers



       y      y                                                   ity of



                                   00:00:                             [place]



                                   00                                 Medical



                                                                      Branch

 

       Tubal  Tubal  Disease Active                              Univers



       ligation ligation                                              ity of



       status status               00:00:                             [place]



                                   00                                 Medical



                                                                      Branch

 

       Cessation Cessation Disease Active                              Uni

vers



       of tobacco of tobacco                                              it

y of



       use in use in               00:00:                             [place]



       previous previous               00                                 Medica

l



       12 months 12 months                                                  Bran

ch

 

       Tubal  Tubal  Disease Active                              Univers



       ligation ligation                                              ity of



       status status               00:00:                             [place]



                                   00                                 Medical



                                                                      Branch

 

       History of History of Disease Active                              U

nivers



                                                      ity of



       delivery, delivery,               00:00:                             [place]

s



       currently currently               00                                 Medi

eliza



       pregnant pregnant                                                  Branch



       in third in third                                                  



       trimester trimester                                                  

 

       Obesity Obesity Disease Active                              Univers



       affecting affecting                                              ity 

of



       pregnancy pregnancy               00:00:                             [place]

s



       in third in third               00                                 Medica

l



       trimester trimester                                                  Bran

ch

 

       Anemia of Anemia of Disease Active                              Uni

vers



       mother in mother in               4-03                               ity 

of



       pregnancy, pregnancy,               00:00:                             Te

[place]



       antepartum antepartum               00                                 Me

dical



                                                                      Branch







Allergies, Adverse Reactions, Alerts







       Allergy Allergy Status Severity Reaction(s) Onset  Inactive Treating Comm

ents 

Source



       Name   Type                        Date   Date   Clinician        

 

       NO KNOWN Drug   Active                                           Univers



       ALLERGIE Class                                                   ity of



       S                                                              [facility]







Social History







           Social Habit Start Date Stop Date  Quantity   Comments   Source

 

           ASSERTION  2018-11-10            Pregnant              University 



                      00:00:00                                    [facility]

 

           Exposure to 2022-04-15 2022 Not sure              [facility]



           SARS-CoV-2 00:00:00   13:31:00                         [facility]



           (event)                                                Branch

 

           Alcohol intake 2022 Current               [facility]



                      00:00:00   00:00:00   non-drinker of            [place] Medi

eliza



                                            alcohol (finding)            Branch

 

           Tobacco use and 2019 Never used            Universit

y of



           exposure   00:00:00   00:00:00                         [facility]

 

           Education  2019 [address] [place] [address]



                      00:00:00   00:00:00                         [facility]

 

           History of            2018 Cigarette Smoker            Universi

ty of



           tobacco use            00:00:00                         [facility]

 

           Sex Assigned At 1995                       Universit

y of



           Birth      00:00:00   00:00:00                         [facility]









                Smoking Status  Start Date      Stop Date       Source

 

                Former smoker   2019 00:00:00 2019 00:00:00 Universi

ty of [facility]

 

                Unknown if ever smoked                                 Universit

y of [facility]







Medications







       Ordered Filled Start  Stop   Current Ordering Indication Dosage Frequency

 Signature

                    Comments            Components          Source



     Medication Medication Date Date Medication? Clinician                (SIG) 

          



     Name Name                                                   

 

     HYDROcodone      2022- No             1{tbl}      1 tablet,         

  Univers



     -acetaminop      -25                          Oral,           ity of



     hen (NORCO)      00:00: 22:51                          ONCE, 1           Te

x[facility]



      mg      00   :00                           dose, On           Medica

l



     tablet 1                                         Mon            Branch



     tablet                                         22 at           



                                                  1900,           



                                                  Routine           

 

     ketorolac            Yes       [id number] 10mg      Take 1          

 Univers



     10 mg      4-25                9103           tablet by           ity of



     tablet      00:00:                               mouth           [place]



               00                                 every 6           Medical



                                                  (six)           Branch



                                                  hours as           



                                                  needed for           



                                                  Pain           



                                                  (scale           



                                                  7-10).           

 

     metroNIDAZO            Yes       [id number] 500mg      Take 1         

  Univers



      mg      4-25                               tablet by           ity o

f



     tablet      00:00:                               mouth 2           [place]



               00                                 (two)           Medical



                                                  times           Branch



                                                  daily.           

 

     ibuprofen            Yes       [id number] 800mg      Take 1           

Univers



     800 mg      4-13                               tablet by           ity of



     tablet      00:00:                               mouth           [place]



               00                                 every 8           Medical



                                                  (eight)           Branch



                                                  hours as           



                                                  needed for           



                                                  Pain           



                                                  (scale           



                                                  4-6).           

 

     methocarbam            Yes       [id number] 500mg      Take 1         

  Univers



     oL        4-13                               tablet by           ity of



     (ROBAXIN)      00:00:                               mouth           [place]



     500 mg      00                                 every 6           Medical



     tablet                                         (six)           Branch



                                                  hours as           



                                                  needed for           



                                                  Pain           



                                                  (scale           



                                                  7-10)           



                                                  (MUSCLE           



                                                  SPASM).           

 

     methocarbam            Yes       [id number] 500mg      Take 1         

  Univers



     oL        4-13                               tablet by           ity of



     (ROBAXIN)      00:00:                               mouth           [place]



     500 mg      00                                 every 6           Medical



     tablet                                         (six)           Branch



                                                  hours as           



                                                  needed for           



                                                  Pain           



                                                  (scale           



                                                  7-10)           



                                                  (MUSCLE           



                                                  SPASM).           

 

     ibuprofen      2022- No        [id number] 800mg      Take 1          

 Univers



     800 mg      4-13 04-25                          tablet by           ity of



     tablet      00:00: 00:00                          mouth           [place]



               00   :00                           every 8           Medical



                                                  (eight)           Branch



                                                  hours as           



                                                  needed for           



                                                  Pain           



                                                  (scale           



                                                  4-6).           

 

     acetaminoph      2021- No             1000mg      1,000 mg,         

  Univers



     en        --                          Oral,           ity of



     (TYLENOL)      05:30: 04:31                          ONCE, 1           [place]

s



     tablet      00   :00                           dose, On           Medical



     1,000 mg                                         Sat            Branch



                                                  21           



                                                  at 2330,           



                                                  ASAP           

 

     ibuprofen      2021- No             800mg      800 mg,           Uni

vers



     (IBU)      -                          Oral,           ity of



     tablet 800      04:30: 03:32                          ONCE, 1           Julio

as



     mg        00   :00                           dose, On           Medical



                                                  Sat            Branch



                                                  21           



                                                  at 2230,           



                                                  ASAP           

 

     ibuprofen      2021      Yes       [id number] 600mg      Take 1           

Univers



     600 mg      2-25                               tablet by           ity of



     tablet      00:00:                               mouth           [place]



               00                                 every 6           Medical



                                                  (six)           Branch



                                                  hours as           



                                                  needed for           



                                                  Pain           



                                                  (scale           



                                                  4-6).           

 

     benzonatate      2021      Yes       [id number] 100mg      Take 1         

  Univers



     100 mg      2-25                               capsule by           ity of



     capsule      00:00:                               mouth 3           [place]



               00                                 (three)           Medical



                                                  times           Branch



                                                  daily as           



                                                  needed for           



                                                  Cough.           

 

     ondansetron      2021      Yes       [id number] 4mg       Take 1          

 Univers



     (ZOFRAN      2-25                               tablet by           ity of



     ODT) 4 mg      00:00:                               mouth           [place]



     disintegrat      00                                 every 8           Medic

al



     ing tablet                                         (eight)           [facility]



                                                  hours as           



                                                  needed for           



                                                  Nausea and           



                                                  Vomiting           



                                                  (N/V).           

 

     ibuprofen      2021      Yes       [id number] 600mg      Take 1           

[facility]



     600 mg      2-25                               tablet by           [facility]



     tablet      00:00:                               mouth           [place]



               00                                 every 6           [facility]



                                                  (six)           [facility]



                                                  hours as           



                                                  needed for           



                                                  Pain           



                                                  (scale           



                                                  4-6).           

 

     benzonatate      2021      Yes       [id number] 100mg      Take 1         

  [facility]



     100 mg      2-25                               capsule by           [facility]



     capsule      00:00:                               mouth 3           [place]



               00                                 (three)           [facility]



                                                  times           [facility]



                                                  daily as           



                                                  needed for           



                                                  Cough.           

 

     ondansetron      2021      Yes       [id number] 4mg       Take 1          

 [facility]



     (ZOFRAN      2-25                               tablet by           [facility] of



     ODT) 4 mg      00:00:                               mouth           [place]



     disintegrat      00                                 every 8           Medic

al



     ing tablet                                         (eight)           [facility]



                                                  hours as           



                                                  needed for           



                                                  Nausea and           



                                                  Vomiting           



                                                  (N/V).           

 

     benzonatate      2021      Yes       [id number] 100mg      Take 1         

  [facility]



     100 mg      2-25                               capsule by           [facility]



     capsule      00:00:                               mouth 3           [place]



               00                                 (three)           [facility]



                                                  times           [facility]



                                                  daily as           



                                                  needed for           



                                                  Cough.           

 

     ondansetron      2021      Yes       [id number] 4mg       Take 1          

 [facility]



     (ZOFRAN      2-25                               tablet by           ity of



     ODT) 4 mg      00:00:                               mouth           [place]



     disintegrat      00                                 every 8           Medic

al



     ing tablet                                         (eight)           [facility]



                                                  hours as           



                                                  needed for           



                                                  Nausea and           



                                                  Vomiting           



                                                  (N/V).           

 

     ibuprofen      2021- No        [id number] 600mg      Take 1          

 [facility]



     600 mg      2-25 04-25                          tablet by           ity of



     tablet      00:00: 00:00                          mouth           [place]



               00   :00                           every 6           [facility]



                                                  (six)           [facility]



                                                  hours as           



                                                  needed for           



                                                  Pain           



                                                  (scale           



                                                  4-6).           

 

     fluconazole      2021- No        [id number] 150mg      Take 1          

 [facility]



     150 mg      -                          tablet by           [facility]



     tablet      00:00: 05:59                          mouth once           [place]

[facility]



               00   :00                           now for 1           [facility]



                                                  dose.           [facility]

 

     ibuprofen      2021- No             400mg      400 mg,           [facility]

[facility]



     (IBU)                                Oral,           [facility]



     tablet 400      09:15: 08:38                          ONCE, 1           [facility]

[facility]



     mg        00   :00                           dose, On           [facility]



                                                  Sat            [facility]



                                                  21           



                                                  at 0315,           



                                                  Routine           

 

     lidocaine-e      2021- No             10mL      10 mL,           [facility]

vers



     pinephrine                                Intraderma           it

y of



     (XYLOCAINE      08:15: 08:38                          l, ONCE, 1           

[place]



     WITH      00   :00                           dose, On           Medical



     EPINEPHRINE                                         Sat            [facility]



     ) 1                                          21           



     %-1:100,000                                         at 0215,           



     injection                                         Routine           



     10 mL                                                        

 

     maalox:diph      2021- No             15mL      15 mL,           [facility]

vers



     enhydrAMINE                                Oral,           ity of



     :lidocaine      04:30: 03:40                          ONCE, 1           [facility]

[facility]



     2 % viscous      00   :00                           dose, On           Medi

[facility]



     1:1:1                                         Fri            [facility]



     (FIRST-MOUT                                         21           



     [employer] BL[employer])                                         at 2230,           



     oral                                         Routine           



     suspension                                                        



     15 mL                                                        

 

     dexamethaso      2021- No             10mg      10 mg, IV           

[facility]



     ne                                  Push,           [facility]



     (DECADRON      04:30: 03:39                          ONCE, 1           [place]

[facility]



     PHOSPHATE)      00   :00                           dose, On           Medic

al



     injection                                         Fri            [facility]



     10 mg                                         21           



                                                  at 2230,           



                                                  STAT           

 

     iopamidol      2021- No        [id number] 100mL      100 mL,         

  [facility]



     (ISOVUE                                Intravenou           ity o

f



     370-500 mL)      03:50: 03:51                          s, ONCE, 1          

 [place]



     injection      00   :00                           dose, On           [facility]

l



     100 mL                                         Fri            [facility]



                                                  21           



                                                  at 2200,           



                                                  Routine           

 

     methylPREDN      2021      Yes       [id number]           Take by          

 [facility]



     ISolone 4                                     mouth           [facility]



     mg tablets      00:00:                               SEE-INSTRU           [facility]

[place]



               00                                 CTIONS.           [facility]



                                                  follow           [facility]



                                                  package           



                                                  directions           

 

     methylPREDN      2021      Yes       [id number]           Take by          

 [facility]



     ISolone 4                                     mouth           [facility]



     mg tablets      00:00:                               SEE-INSTRU           [facility]

[place]



               00                                 CTIONS.           [facility]



                                                  follow           [facility]



                                                  package           



                                                  directions           

 

     methylPREDN      2021      Yes       [id number]           Take by          

 [facility]



     ISolone 4                                     mouth           [facility]



     mg tablets      00:00:                               SEE-INSTRU           [facility]

[place]



               00                                 CTIONS.           [facility]



                                                  follow           [facility]



                                                  package           



                                                  directions           

 

     methylPREDN      2021      Yes       [id number]           Take by          

 [facility]



     ISolone 4                                     mouth           [facility]



     mg tablets      00:00:                               SEE-INSTRU           T

[place]



               00                                 CTIONS.           [facility]



                                                  follow           [facility]



                                                  package           



                                                  directions           

 

     methylPREDN      2021      Yes       [id number]           Take by          

 [facility]



     ISolone 4      -27                               mouth           [facility]



     mg tablets      00:00:                               SEE-INSTRU           [facility]

[facility]



               00                                 CTIONS.           [facility]



                                                  follow           [facility]



                                                  package           



                                                  directions           

 

     methylPREDN      2021      Yes       [id number]           Take by          

 [facility]



     ISolone 4      1-27                               mouth           [facility]



     mg tablets      00:00:                               SEE-INSTRU           [facility]

[facility]



               00                                 CTIONS.           [facility]



                                                  follow           [facility]



                                                  package           



                                                  directions           

 

     amoxicillin      2021- No        [id number] 1{tbl}      Take 1        

   [facility]



     -clavulanat       12-08                          tablet by           it

y of



     e         00:00: 05:59                          mouth 2           [place]



     (AUGMENTIN)      00   :00                           (two)           [facility]



     875-125 mg                                         times           [facility]



     per tablet                                         daily for           



                                                  10 days.           

 

     amoxicillin      2021- No        [id number] 1{tbl}      Take 1        

   [facility]



     -clavulanat       12-08                          tablet by           it

y of



     e         00:00: 05:59                          mouth 2           [place]



     (AUGMENTIN)      00   :00                           (two)           [facility]



     875-125 mg                                         times           [facility]



     per tablet                                         daily for           



                                                  10 days.           

 

     amoxicillin      2021- No        [id number] 1{tbl}      Take 1        

   [facility]



     -clavulanat       12-08                          tablet by           it

y of



     e         00:00: 05:59                          mouth 2           [place]



     (AUGMENTIN)      00   :00                           (two)           [facility]



     875-125 mg                                         times           [facility]



     per tablet                                         daily for           



                                                  10 days.           

 

     lidocaine      2021- No             15mL      15 mL,           [facility]

rs



     2% viscous                                Oral,           ity of



     (LIDOCAINE      18:45: 17:46                          ONCE, 1           [facility]

[facility]



     VISCOUS) 2      00   :00                           dose, On           Medic

al



     % solution                                         Wed            [facility]



     15 mL                                         21           



                                                  at 1245,           



                                                  ASAP           

 

     dexamethaso      2021- No             10mg      10 mg,           [facility]

vers



     ne                                  Oral,           [facility]



     (DECADRON      18:45: 17:46                          ONCE, 1           [place]

[facility]



     PHOSPHATE)      00   :00                           dose, On           Medic

al



     injection                                         Wed            [facility]



     10 mg                                         21           



                                                  at 1245,           



                                                  Routine           

 

     penicillin      2021- No             1.210      1.2            [facility]

rs



     g                                   [place]           [facility] of



     benzathine      18:45: 17:45                          Units,           [place]

[facility]



     (BICILLIN      00   :00                           Intramuscu           Medi

[facility]



     L-A)                                         lar, ONCE,           [facility]



     injection                                         1 dose, On           



     1.2 Million                                         Wed            



     Units                                         21           



                                                  at 1245,           



                                                  ASAP<br>Re           



                                                  ason for           



                                                  Anti-Infec           



                                                  tive:           



                                                  Empiric           



                                                  Therapy           



                                                  for            



                                                  Suspected           



                                                  Infection<           



                                                  br>Empiric           



                                                  Therapy           



                                                  Site:           



                                                  HEENT<br>D           



                                                  uration of           



                                                  therapy:           



                                                  72 hours           

 

     iopamidol      2021- No        [id number] 100mL      100 mL,          

 [facility]



     (ISOVUE      0-29 10-29                          Intravenou           ity o

f



     370-500 mL)      19:00: 17:40                          s, ONCE, 1          

 [place]



     injection      00   :00                           dose, On           [facility]

l



     100 mL                                         Fri            [facility]



                                                  10/29/21           



                                                  at 1400,           



                                                  Routine           

 

     FENTanyl PF      2021- No             100ug      100 mcg,           

[facility]



     (SUBLIMAZE      0-29 10-                          Slow IV           ity o

f



     (PF))      18:30: 17:52                          Push,           [place]



     injection      00   :00                           ONCE, 1           Medical



     100 mcg                                         dose, On           [facility]



                                                  Fri            



                                                  10/29/21           



                                                  at 1330,           



                                                  STAT           

 

     ibuprofen      2021      Yes       [id number] 600mg      Take 1           

[facility]



     600 mg      0-29                               tablet by           [facility]



     tablet      00:00:                               mouth           [place]



               00                                 every 8           [facility]



                                                  (eight)           [facility]



                                                  hours as           



                                                  needed for           



                                                  Pain           



                                                  (scale           



                                                  4-6).           

 

     dicyclomine      2021      Yes       [id number] 20mg      Take 1          

 [facility]



     20 mg      0-29                               tablet by           [facility]



     tablet      00:00:                               mouth 4           [place]



               00                                 (four)           [facility]



                                                  times           [facility]



                                                  daily.           

 

     ibuprofen      2021      Yes       [id number] 600mg      Take 1           

[facility]



     600 mg      0-29                               tablet by           ity of



     tablet      00:00:                               mouth           [place]



               00                                 every 8           [facility]



                                                  (eight)           [facility]



                                                  hours as           



                                                  needed for           



                                                  Pain           



                                                  (scale           



                                                  4-6).           

 

     dicyclomine      2021      Yes       [id number] 20mg      Take 1          

 [facility]



     20 mg      0-29                               tablet by           [facility]



     tablet      00:00:                               mouth 4           [place]



               00                                 (four)           [facility]



                                                  times           [facility]



                                                  daily.           

 

     ibuprofen      2021      Yes       [id number] 600mg      Take 1           

[facility]



     600 mg      0-29                               tablet by           ity of



     tablet      00:00:                               mouth           [place]



               00                                 every 8           [facility]



                                                  (eight)           [facility]



                                                  hours as           



                                                  needed for           



                                                  Pain           



                                                  (scale           



                                                  4-6).           

 

     dicyclomine      2021      Yes       [id number] 20mg      Take 1          

 [facility]



     20 mg      0-29                               tablet by           [facility]



     tablet      00:00:                               mouth 4           [place]



               00                                 (four)           [facility]



                                                  times           [facility]



                                                  daily.           

 

     ibuprofen      2021      Yes       [id number] 600mg      Take 1           

[facility]



     600 mg      0-29                               tablet by           ity of



     tablet      00:00:                               mouth           [place]



               00                                 every 8           [facility]



                                                  (eight)           [facility]



                                                  hours as           



                                                  needed for           



                                                  Pain           



                                                  (scale           



                                                  4-6).           

 

     dicyclomine      2021      Yes       [id number] 20mg      Take 1          

 [facility]



     20 mg      0-29                               tablet by           [facility]



     tablet      00:00:                               mouth 4           [place]



               00                                 (four)           [facility]



                                                  times           [facility]



                                                  daily.           

 

     ibuprofen      2021      Yes       [id number] 600mg      Take 1           

[facility]



     600 mg      0-29                               tablet by           ity of



     tablet      00:00:                               mouth           [place]



               00                                 every 8           [facility]



                                                  (eight)           [facility]



                                                  hours as           



                                                  needed for           



                                                  Pain           



                                                  (scale           



                                                  4-6).           

 

     dicyclomine      2021      Yes       [id number] 20mg      Take 1          

 [facility]



     20 mg      0-29                               tablet by           [facility]



     tablet      00:00:                               mouth 4           [place]



               00                                 (four)           [facility]



                                                  times           [facility]



                                                  daily.           

 

     ibuprofen      2021      Yes       [id number] 600mg      Take 1           

[facility]



     600 mg      0-29                               tablet by           [facility]



     tablet      00:00:                               mouth           [place]



               00                                 every 8           [facility]



                                                  (eight)           [facility]



                                                  hours as           



                                                  needed for           



                                                  Pain           



                                                  (scale           



                                                  4-6).           

 

     dicyclomine      2021      Yes       [id number] 20mg      Take 1          

 [facility]



     20 mg      0-29                               tablet by           [facility]



     tablet      00:00:                               mouth 4           [place]



               00                                 (four)           Medical



                                                  times           [facility]



                                                  daily.           

 

     ibuprofen      2021      Yes       [id number] 600mg      Take 1           

[facility]



     600 mg      0-29                               tablet by           [facility]



     tablet      00:00:                               mouth           [place]



               00                                 every 8           [facility]



                                                  (eight)           [facility]



                                                  hours as           



                                                  needed for           



                                                  Pain           



                                                  (scale           



                                                  4-6).           

 

     dicyclomine      2021      Yes       [id number] 20mg      Take 1          

 [facility]



     20 mg      0-29                               tablet by           [facility]



     tablet      00:00:                               mouth 4           [place]



               00                                 (four)           Medical



                                                  times           [facility]



                                                  daily.           

 

     ibuprofen      2021      Yes       [id number] 600mg      Take 1           

[facility]



     600 mg      0-29                               tablet by           [facility]



     tablet      00:00:                               mouth           [place]



               00                                 every 8           [facility]



                                                  (eight)           [facility]



                                                  hours as           



                                                  needed for           



                                                  Pain           



                                                  (scale           



                                                  4-6).           

 

     dicyclomine      2021      Yes       [id number] 20mg      Take 1          

 [facility]



     20 mg      0-29                               tablet by           [facility]



     tablet      00:00:                               mouth 4           [place]



               00                                 (four)           Medical



                                                  times           [facility]



                                                  daily.           

 

     ibuprofen      2021      Yes       [id number] 600mg      Take 1           

[facility]



     600 mg      0-29                               tablet by           [facility]



     tablet      00:00:                               mouth           [place]



               00                                 every 8           [facility]



                                                  (eight)           [facility]



                                                  hours as           



                                                  needed for           



                                                  Pain           



                                                  (scale           



                                                  4-6).           

 

     dicyclomine      2021      Yes       [id number] 20mg      Take 1          

 [facility]



     20 mg      0-29                               tablet by           [facility]



     tablet      00:00:                               mouth 4           [place]



               00                                 (four)           Medical



                                                  times           [facility]



                                                  daily.           

 

     dicyclomine      2021      Yes       [id number] 20mg      Take 1          

 [facility]



     20 mg      0-29                               tablet by           [facility]



     tablet      00:00:                               mouth 4           [place]



               00                                 (four)           [facility]



                                                  times           [facility]



                                                  daily.           

 

     ibuprofen      2021- No        [id number] 600mg      Take 1          

 [facility]



     600 mg      0-29 04-25                          tablet by           [facility]



     tablet      00:00: 00:00                          mouth           [place]



               00   :00                           every 8           [facility]



                                                  (eight)           [facility]



                                                  hours as           



                                                  needed for           



                                                  Pain           



                                                  (scale           



                                                  4-6).           

 

     ibuprofen      2020- No             800mg      800 mg,           [facility]

[facility]



     (IBU)                                Oral,           [facility]



     tablet 800      00:15: 23:30                          ONCE, 1           [facility]

[facility]



     mg        00   :00                           dose, Sat           Medical



                                                  20           [facility]



                                                  at 1815,           



                                                  ASAP           

 

     lidocaine      2020- No             10mL      10 mL,           [facility]

rs



     2% viscous                                Oral,           [facility]



     (LIDOCAINE      00:15: 23:30                          ONCE, 1           [facility]

[facility]



     VISCOUS) 2      00   :00                           dose, Sat           [facility]

[facility]iza



     % solution                                         20           [facility]

h



     10 mL                                         at 1815,           



                                                  ASAP           

 

     dexamethaso      2020- No             10mg      10 mg,           [facility]

[facility]



     ne                                  Intramuscu           [facility] of



     (DECADRON      00:15: 23:30                          lar, ONCE,           [facility]

[facility]



     PHOSPHATE)      00   :00                           1 dose,           [facility]

l



     injection                                         Sat            [facility]



     10 mg                                         20           



                                                  at 1815,           



                                                  STAT           

 

     No known      2020      No                                      [facility]



     medications                                                    [facility] of



               18:14:                                              [place]



               13                                                Medical



                                                                 [facility]

 

     cephALEXin      2020- No        [id number] 500mg      Take 1          

 [facility]



     500 mg      02                          tablet by           [facility]



     tablet      00:00: 05:59                          mouth 2           [place]



               00   :00                           (two)           [facility]



                                                  times           [facility]



                                                  daily for           



                                                  10 days.           

 

     cephALEXin      2020- No        [id number] 500mg      Take 1          

 [facility]



     500 mg      02                          tablet by           [facility]



     tablet      00:00: 05:59                          mouth 2           [place]



               00   :00                           (two)           Medical



                                                  times           [facility]



                                                  daily for           



                                                  10 days.           

 

     lactated      2019-0      Yes            1000mL      at 75           [facility]

s



     ringers IV      9-05                               mL/hr,           [facility]



     infusion      14:15:                               1,000 mL,           [place]

[facility]



     1,000 mL      00                                 IV             [facility]



                                                  Infusion,           [facility]



                                                  CONTINUOUS           



                                                  , Starting           



                                                  u 19           



                                                  at 0915,           



                                                  Until           



                                                  Discontinu           



                                                  ed,            



                                                  Routine,           



                                                  PACU           

 

     HYDROcodone      -0      Yes            1{tbl}      1 tablet,          

 [facility]



     -acetaminop      9-05                               Oral, PRN,           it

y of



     hen (NORCO      14:13:                               1 dose,           [place]

[facility]



     5) 5-325 mg      31                                 Starting           Medi

eliza



     tablet 1                                         Thu 19           [facility]

h



     tablet                                         at 0913,           



                                                  Until           



                                                  Discontinu           



                                                  ed,            



                                                  Routine,           



                                                  Pain           



                                                  (scale           



                                                  7-10), DSU           



                                                  Recovery           

 

     ibuprofen      -      Yes            800mg      800 mg,           [facility]

ers



     (IBU)      9-05                               Oral, PRN,           [facility]



     tablet 800      14:13:                               1 dose,           [place]

[facility]



     mg        31                                 Starting           [facility]



                                                  Thu 19           [facility]



                                                  at 0913,           



                                                  Until           



                                                  Discontinu           



                                                  ed,            



                                                  Routine,           



                                                  Pain           



                                                  (scale           



                                                  1-3), Pain           



                                                  (scale           



                                                  4-6), DSU           



                                                  Recovery           

 

     ibuprofen            Yes            800mg      800 mg,           [facility]

ers



     (IBU)      9-05                               Oral, PRN,           [facility]



     tablet 800      14:13:                               1 dose,           [place]

[facility]



     mg        31                                 Starting           [facility]



                                                  Thu 19           [facility]



                                                  at 0913,           



                                                  Until           



                                                  Discontinu           



                                                  ed,            



                                                  Routine,           



                                                  Pain           



                                                  (scale           



                                                  1-3), DSU           



                                                  Recovery           

 

     HYDROmorpho      -0      Yes            .2mg      0.2 mg,           [facility]

vers



     ne        -05                               Slow IV           [facility] of



     (DILAUDID)      14:13:                               Push,           [place]



     injection      20                                 Q5MIN PRN,           Medi

eliza



     0.2 mg                                         10 doses,           [facility]



                                                  Starting           



                                                  Thu 19           



                                                  at 0913,           



                                                  Until           



                                                  Discontinu           



                                                  ed,            



                                                  Routine,           



                                                  Pain           



                                                  (scale           



                                                  7-10),           



                                                  PACU<br>Us           



                                                  e approved           



                                                  by             



                                                  (Faculty):           



                                                  [facility],           



                                                  PAIN           



                                                  SERVICE           

 

     FENTanyl PF      -0      Yes            25ug      25 mcg,           [facility]

[facility]



     (SUBLIMAZE      05                               Slow IV           [facility]



     (PF))      14:13:                               Push,           [place]



     injection      20                                 Q5MIN PRN,           Medi

eliza



     25 mcg                                         4 doses,           [facility]



                                                  Starting           



                                                  Thu 19           



                                                  at 0913,           



                                                  Until           



                                                  Discontinu           



                                                  ed,            



                                                  Routine,           



                                                  Pain           



                                                  (scale           



                                                  4-6), PACU           

 

     ondansetron      2019-0      Yes            4mg       4 mg, Slow           

[facility]



     (ZOFRAN      9-05                               IV Push,           [facility]



     (PF))      14:13:                               PRN, 1           [place]



     injection 4      20                                 dose,           Medical



     mg                                           Starting           [facility]



                                                  Thu 19           



                                                  at 0913,           



                                                  Until           



                                                  Discontinu           



                                                  ed,            



                                                  Routine,           



                                                  Nausea and           



                                                  Vomiting           



                                                  (N/V),           



                                                  PACU           

 

     bupivacaine      2019-0      Yes                      PRN,           [facility]

s



     (preserv      9-05                               Starting           [facility] of



     free)      13:56:                               Thu 19           [place]



     (SENSORCAIN      00                                 at 0856,           Medi

[facility]



     E MPF) 0.25                                         Until           [facility]



     % (2.5                                         Discontinu           



     mg/mL)                                         ed,            



     injection                                         Routine,           



                                                  Intra-op           

 

     ibuprofen      -      Yes       [id number] 800mg      Take 1           

[facility]



     800 mg      9-05                               tablet by           [facility]



     tablet      00:00:                               mouth           [place]



               00                                 every 6           [facility]



                                                  (six)           [facility]



                                                  hours as           



                                                  needed for           



                                                  Pain           



                                                  (scale           



                                                  4-6).           

 

     HYDROcodone      -      Yes       [id number] 1{tbl}      Take 1      

     [facility]



     -acetaminop      9-05                108            tablet by           [facility]

 of



     hen 5-325      00:00:                               mouth           [place]



     mg tablet      00                                 every 6           [facility]



                                                  (six)           [facility]



                                                  hours as           



                                                  needed for           



                                                  Pain           



                                                  (scale           



                                                  7-10).           

 

     ibuprofen       2020- No        [id number] 800mg      Take 1          

 [facility]



     800 mg      9-05 04-28                          tablet by           [facility] of



     tablet      00:00: 00:00                          mouth           [place]



               00   :00                           every 6           [facility]



                                                  (six)           [facility]



                                                  hours as           



                                                  needed for           



                                                  Pain           



                                                  (scale           



                                                  4-6).           

 

     HYDROcodone       2020- No        [id number] 1{tbl}      Take 1       

    [facility]



     -acetaminop      9-05 04-28                          tablet by           it

y of



     hen 5-325      00:00: 00:00                          mouth           [place]



     mg tablet      00   :00                           every 6           [facility]



                                                  (six)           [facility]



                                                  hours as           



                                                  needed for           



                                                  Pain           



                                                  (scale           



                                                  7-10).           

 

     ibuprofen            Yes       [id number] 800mg      Take 1         

  [facility]



     800 mg      8-15                108            tablet by           [facility]



     tablet      00:00:                               mouth           [place]



               00                                 every 6           [facility]



                                                  (six)           [facility]



                                                  hours as           



                                                  needed for           



                                                  Pain           



                                                  (scale           



                                                  4-6).           

 

     HYDROcodone            Yes       [id number] 1{tbl}      Take 1      

     [facility]



     -acetaminop      8-15                108            tablet by           [facility]

 of



     hen 5-325      00:00:                               mouth           [place]



     mg tablet      00                                 every 6           [facility]



                                                  (six)           [facility]



                                                  hours as           



                                                  needed for           



                                                  Pain           



                                                  (scale           



                                                  7-10).           

 

     ibuprofen            Yes       [id number] 800mg      Take 1         

  [facility]



     800 mg      8-15                108            tablet by           ity of



     tablet      00:00:                               mouth           [place]



               00                                 every 6           [facility]



                                                  (six)           [facility]



                                                  hours as           



                                                  needed for           



                                                  Pain           



                                                  (scale           



                                                  4-6).           

 

     HYDROcodone            Yes       [id number] 1{tbl}      Take 1      

     [facility]



     -acetaminop      8-15                108            tablet by           [facility]

 of



     hen 5-325      00:00:                               mouth           [place]



     mg tablet      00                                 every 6           [facility]



                                                  (six)           [facility]



                                                  hours as           



                                                  needed for           



                                                  Pain           



                                                  (scale           



                                                  7-10).           

 

     ibuprofen            Yes       [id number] 800mg      Take 1         

  [facility]



     800 mg      8-15                108            tablet by           [facility]



     tablet      00:00:                               mouth           [place]



               00                                 every 6           Medical



                                                  (six)           Branch



                                                  hours as           



                                                  needed for           



                                                  Pain           



                                                  (scale           



                                                  4-6).           

 

     HYDROcodone            Yes       [id number] 1{tbl}      Take 1      

     [facility]



     -acetaminop      8-15                108            tablet by           [facility]

 of



     hen 5-325      00:00:                               mouth           [place]



     mg tablet      00                                 every 6           Medical



                                                  (six)           Branch



                                                  hours as           



                                                  needed for           



                                                  Pain           



                                                  (scale           



                                                  7-10).           

 

     ibuprofen       2019- No        [id number] 800mg      Take 1        

   [facility]



     800 mg      8-15 09-05           108            tablet by           [facility] of



     tablet      00:00: 00:00                          mouth           [place]



               00   :00                           every 6           Medical



                                                  (six)           Branch



                                                  hours as           



                                                  needed for           



                                                  Pain           



                                                  (scale           



                                                  4-6).           

 

     HYDROcodone       2019- No        [id number] 1{tbl}      Take 1     

      [facility]



     -acetaminop      8-15 09-05           108            tablet by           it

y of



     hen 5-325      00:00: 00:00                          mouth           [place]



     mg tablet      00   :00                           every 6           Medical



                                                  (six)           [facility]



                                                  hours as           



                                                  needed for           



                                                  Pain           



                                                  (scale           



                                                  7-10).           

 

     docusate            Yes       [id number] 240mg      Take 1           [facility]

[facility]



     calcium 240      7-26                               capsule by           it

y of



     mg capsule      00:00:                               mouth once           [facility]

[facility]



               00                                 daily as           [facility]



                                                  needed for           [facility]



                                                  Constipati           



                                                  on. May           



                                                  substitute           



                                                  for what           



                                                  is in           



                                                  stock and           



                                                  covered by           



                                                  patient           



                                                  plan           

 

     ferrous            Yes       [id number] 325mg      Take 1           [facility]

[facility]



     sulfate 325      7-26                               tablet by           [facility]

 of



     mg (65 mg      00:00:                               mouth 3           [place]



     iron)      00                                 (three)           Medical



     tablet                                         times           [facility]



                                                  daily with           



                                                  meals. May           



                                                  substitute           



                                                  for what           



                                                  is in           



                                                  stock and           



                                                  covered by           



                                                  patient           



                                                  plan           

 

     ibuprofen            Yes       [id number] 600mg      Take 1           

[facility]



     600 mg      7-26                               tablet by           [facility]



     tablet      00:00:                               mouth           [place]



               00                                 every 6           Medical



                                                  (six)           [facility]



                                                  hours as           



                                                  needed for           



                                                  Pain           



                                                  (scale           



                                                  1-3) or           



                                                  Pain           



                                                  (scale           



                                                  4-6)           



                                                  (Pain).           



                                                  Take with           



                                                  food or           



                                                  milk.           

 

     foLIC acid            Yes       [id number] 1mg       Take 1           

[facility]



     1 mg tablet      7-26                               tablet by           [facility]

 of



               00:00:                               mouth           [place]



               00                                 daily.           Medical



                                                                 Branch

 

     ascorbic            Yes       [id number] 500mg      Take 1           [facility]

[facility]



     acid,      7-26                               tablet by           [facility]



     vitamin C,      00:00:                               mouth 3           [place]

[facility]



     (VITAMIN C)      00                                 (three)           [facility]

al



     500 mg                                         times           [facility]



     tablet                                         daily.           

 

     HYDROcodone            Yes       [id number] 1{tbl}      Take 1        

   [facility]



     -acetaminop      7-26                               tablet by           [facility]

 of



     hen 5-325      00:00:                               mouth           [place]



     mg tablet      00                                 every 6           Medical



                                                  (six)           [facility]



                                                  hours as           



                                                  needed for           



                                                  Pain           



                                                  (scale           



                                                  7-10).           

 

     docusate            Yes       [id number] 240mg      Take 1           [facility]

[facility]



     calcium 240      7-26                               capsule by           it

y of



     mg capsule      00:00:                               mouth once           [facility]

[facility]



               00                                 daily as           [facility]



                                                  needed for           [facility]



                                                  Constipati           



                                                  on. May           



                                                  substitute           



                                                  for what           



                                                  is in           



                                                  stock and           



                                                  covered by           



                                                  patient           



                                                  plan           

 

     ferrous            Yes       [id number] 325mg      Take 1           [facility]

[facility]



     sulfate 325      7-26                               tablet by           [facility]

 of



     mg (65 mg      00:00:                               mouth 3           [place]



     iron)      00                                 (three)           Medical



     tablet                                         times           [facility]



                                                  daily with           



                                                  meals. May           



                                                  substitute           



                                                  for what           



                                                  is in           



                                                  stock and           



                                                  covered by           



                                                  patient           



                                                  plan           

 

     ibuprofen            Yes       [id number] 600mg      Take 1           

[facility]



     600 mg      7-26                               tablet by           [facility]



     tablet      00:00:                               mouth           [place]



               00                                 every 6           Medical



                                                  (six)           [facility]



                                                  hours as           



                                                  needed for           



                                                  Pain           



                                                  (scale           



                                                  1-3) or           



                                                  Pain           



                                                  (scale           



                                                  4-6)           



                                                  (Pain).           



                                                  Take with           



                                                  food or           



                                                  milk.           

 

     foLIC acid            Yes       [id number] 1mg       Take 1           

[facility]



     1 mg tablet      7-26                               tablet by           [facility]

 of



               00:00:                               mouth           [place]



               00                                 daily.           Medical



                                                                 [facility]

 

     ascorbic            Yes       [id number] 500mg      Take 1           [facility]

[facility]



     acid,      7-26                               tablet by           [facility]



     vitamin C,      00:00:                               mouth 3           [place]

[facility]



     (VITAMIN C)      00                                 (three)           [facility]

al



     500 mg                                         times           [facility]



     tablet                                         daily.           

 

     HYDROcodone            Yes       [id number] 1{tbl}      Take 1        

   [facility]



     -acetaminop      7-26                               tablet by           [facility]

 of



     hen 5-325      00:00:                               mouth           [place]



     mg tablet      00                                 every 6           Medical



                                                  (six)           [facility]



                                                  hours as           



                                                  needed for           



                                                  Pain           



                                                  (scale           



                                                  7-10).           

 

     docusate            Yes       [id number] 240mg      Take 1           [facility]

[facility]



     calcium 240      7-26                               capsule by           it

y of



     mg capsule      00:00:                               mouth once           [facility]

[facility]



               00                                 daily as           [facility]



                                                  needed for           [facility]



                                                  Constipati           



                                                  on. May           



                                                  substitute           



                                                  for what           



                                                  is in           



                                                  stock and           



                                                  covered by           



                                                  patient           



                                                  plan           

 

     ferrous            Yes       [id number] 325mg      Take 1           [facility]

[facility]



     sulfate 325      7-26                               tablet by           [facility]

 of



     mg (65 mg      00:00:                               mouth 3           [place]



     iron)      00                                 (three)           Medical



     tablet                                         times           [facility]



                                                  daily with           



                                                  meals. May           



                                                  substitute           



                                                  for what           



                                                  is in           



                                                  stock and           



                                                  covered by           



                                                  patient           



                                                  plan           

 

     ibuprofen            Yes       [id number] 600mg      Take 1           

[facility]



     600 mg      7-26                               tablet by           [facility]



     tablet      00:00:                               mouth           [place]



               00                                 every 6           Medical



                                                  (six)           [facility]



                                                  hours as           



                                                  needed for           



                                                  Pain           



                                                  (scale           



                                                  1-3) or           



                                                  Pain           



                                                  (scale           



                                                  4-6)           



                                                  (Pain).           



                                                  Take with           



                                                  food or           



                                                  milk.           

 

     foLIC acid            Yes       [id number] 1mg       Take 1           

[facility]



     1 mg tablet      7-26                               tablet by           [facility]

 of



               00:00:                               mouth           [place]



               00                                 daily.           Medical



                                                                 [facility]

 

     ascorbic            Yes       [id number] 500mg      Take 1           [facility]

[facility]



     acid,      7-26                               tablet by           [facility]



     vitamin C,      00:00:                               mouth 3           [place]

[facility]



     (VITAMIN C)      00                                 (three)           [facility]

al



     500 mg                                         times           [facility]



     tablet                                         daily.           

 

     HYDROcodone            Yes       [id number] 1{tbl}      Take 1        

   [facility]



     -acetaminop      7-26                               tablet by           [facility]

 of



     hen 5-325      00:00:                               mouth           [place]



     mg tablet      00                                 every 6           Medical



                                                  (six)           [facility]



                                                  hours as           



                                                  needed for           



                                                  Pain           



                                                  (scale           



                                                  7-10).           

 

     docusate            Yes       [id number] 240mg      Take 1           [facility]

[facility]



     calcium 240      7-26                               capsule by           it

y of



     mg capsule      00:00:                               mouth once           [facility]

[facility]



               00                                 daily as           [facility]



                                                  needed for           [facility]



                                                  Constipati           



                                                  on. May           



                                                  substitute           



                                                  for what           



                                                  is in           



                                                  stock and           



                                                  covered by           



                                                  patient           



                                                  plan           

 

     ferrous            Yes       [id number] 325mg      Take 1           [facility]

[facility]



     sulfate 325      7-26                               tablet by           [facility]

 of



     mg (65 mg      00:00:                               mouth 3           [place]



     iron)      00                                 (three)           Medical



     tablet                                         times           [facility]



                                                  daily with           



                                                  meals. May           



                                                  substitute           



                                                  for what           



                                                  is in           



                                                  stock and           



                                                  covered by           



                                                  patient           



                                                  plan           

 

     ibuprofen      -      Yes       [id number] 600mg      Take 1           

[facility]



     600 mg      7-26                               tablet by           [facility]



     tablet      00:00:                               mouth           [place]



               00                                 every 6           [facility]



                                                  (six)           [facility]



                                                  hours as           



                                                  needed for           



                                                  Pain           



                                                  (scale           



                                                  1-3) or           



                                                  Pain           



                                                  (scale           



                                                  4-6)           



                                                  (Pain).           



                                                  Take with           



                                                  food or           



                                                  milk.           

 

     foLIC acid            Yes       [id number] 1mg       Take 1           

[facility]



     1 mg tablet      7-26                               tablet by           [facility]

 of



               00:00:                               mouth           [place]



               00                                 daily.           Medical



                                                                 [facility]

 

     ascorbic            Yes       [id number] 500mg      Take 1           [facility]

[facility]



     acid,      7-26                               tablet by           [facility]



     vitamin C,      00:00:                               mouth 3           [place]

[facility]



     (VITAMIN C)      00                                 (three)           [facility]

al



     500 mg                                         times           [facility]



     tablet                                         daily.           

 

     HYDROcodone            Yes       [id number] 1{tbl}      Take 1        

   [facility]



     -acetaminop      7-26                               tablet by           [facility]

 of



     hen 5-325      00:00:                               mouth           [place]



     mg tablet      00                                 every 6           Medical



                                                  (six)           [facility]



                                                  hours as           



                                                  needed for           



                                                  Pain           



                                                  (scale           



                                                  7-10).           

 

     ascorbic            Yes       [id number] 500mg      Take 1           [facility]

[facility]



     acid,      7-26                               tablet by           [facility]



     vitamin C,      00:00:                               mouth 3           [place]

[facility]



     (VITAMIN C)      00                                 (three)           [facility]

al



     500 mg                                         times           [facility]



     tablet                                         daily.           

 

     ascorbic            Yes       [id number] 500mg      Take 1           [facility]

[facility]



     acid,      7-26                               tablet by           [facility]



     vitamin C,      00:00:                               mouth 3           [place]

[facility]



     (VITAMIN C)      00                                 (three)           Med[facility]

al



     500 mg                                         times           [facility]



     tablet                                         daily.           

 

     docusate            Yes       [id number] 240mg      Take 1           [facility]

[facility]



     calcium 240      7-26                               capsule by           it

y of



     mg capsule      00:00:                               mouth once           [facility]

[facility]



               00                                 daily as           [facility]



                                                  needed for           [facility]



                                                  Constipati           



                                                  on. May           



                                                  substitute           



                                                  for what           



                                                  is in           



                                                  stock and           



                                                  covered by           



                                                  patient           



                                                  plan           

 

     ferrous      2019-0      Yes       [id number] 325mg      Take 1           [facility]

[facility]



     sulfate 325      7-26                               tablet by           [facility]

 of



     mg (65 mg      00:00:                               mouth 3           [place]



     iron)      00                                 (three)           Medical



     tablet                                         times           [facility]



                                                  daily with           



                                                  meals. May           



                                                  substitute           



                                                  for what           



                                                  is in           



                                                  stock and           



                                                  covered by           



                                                  patient           



                                                  plan           

 

     ibuprofen            Yes       [id number] 600mg      Take 1           

[facility]



     600 mg      7-26                               tablet by           ity of



     tablet      00:00:                               mouth           [place]



               00                                 every 6           Medical



                                                  (six)           [facility]



                                                  hours as           



                                                  needed for           



                                                  Pain           



                                                  (scale           



                                                  1-3) or           



                                                  Pain           



                                                  (scale           



                                                  4-6)           



                                                  (Pain).           



                                                  Take with           



                                                  food or           



                                                  milk.           

 

     ascorbic            Yes       [id number] 500mg      Take 1           [facility]

[facility]



     acid,      7-26                               tablet by           [facility]



     vitamin C,      00:00:                               mouth 3           [place]

[facility]



     (VITAMIN C)      00                                 (three)           Med[facility]

al



     500 mg                                         times           [facility]



     tablet                                         daily.           

 

     foLIC acid            Yes       [id number] 1mg       Take 1           

[facility]



     1 mg tablet      7-26                               tablet by           [facility]

 of



               00:00:                               mouth           [place]



               00                                 daily.           Medical



                                                                 [facility]

 

     ascorbic            Yes       [id number] 500mg      Take 1           [facility]

[facility]



     acid,      7-26                               tablet by           [facility] of



     vitamin C,      00:00:                               mouth 3           [place]

[facility]



     (VITAMIN C)      00                                 (three)           [facility]

al



     500 mg                                         times           [facility]



     tablet                                         daily.           

 

     docusate            Yes       [id number] 240mg      Take 1           [facility]

n[facility]



     calcium 240      7-26                               capsule by           it

y of



     mg capsule      00:00:                               mouth once           [facility]

[facility]



               00                                 daily as           [facility]



                                                  needed for           [facility]



                                                  Constipati           



                                                  on. May           



                                                  substitute           



                                                  for what           



                                                  is in           



                                                  stock and           



                                                  covered by           



                                                  patient           



                                                  plan           

 

     ferrous            Yes       [id number] 325mg      Take 1           [facility]

[facility]



     sulfate 325      7-26                               tablet by           [facility]

 of



     mg (65 mg      00:00:                               mouth 3           [place]



     iron)      00                                 (three)           Medical



     tablet                                         times           [facility]



                                                  daily with           



                                                  meals. May           



                                                  substitute           



                                                  for what           



                                                  is in           



                                                  stock and           



                                                  covered by           



                                                  patient           



                                                  plan           

 

     ibuprofen            Yes       [id number] 600mg      Take 1           

[facility]



     600 mg      7-26                               tablet by           ity of



     tablet      00:00:                               mouth           [place]



               00                                 every 6           Medical



                                                  (six)           [facility]



                                                  hours as           



                                                  needed for           



                                                  Pain           



                                                  (scale           



                                                  1-3) or           



                                                  Pain           



                                                  (scale           



                                                  4-6)           



                                                  (Pain).           



                                                  Take with           



                                                  food or           



                                                  milk.           

 

     foLIC acid            Yes       [id number] 1mg       Take 1           

[facility]



     1 mg tablet      7-26                               tablet by           ity

 of



               00:00:                               mouth           [place]



               00                                 daily.           Medical



                                                                 [facility]

 

     ascorbic            Yes       [id number] 500mg      Take 1           [facility]

n[facility]



     acid,      7-                               tablet by           [facility] of



     vitamin C,      00:00:                               mouth 3           [place]

[facility]



     (VITAMIN C)      00                                 (three)           Medic

al



     500 mg                                         times           [facility]



     tablet                                         daily.           

 

     HYDROcodone            Yes       [id number] 1{tbl}      Take 1        

   [facility]



     -acetaminop      7-26                               tablet by           ity

 of



     hen 5-325      00:00:                               mouth           [place]



     mg tablet      00                                 every 6           Medical



                                                  (six)           [facility]



                                                  hours as           



                                                  needed for           



                                                  Pain           



                                                  (scale           



                                                  7-10).           

 

     ascorbic       2019- No        [id number] 500mg      Take 1           

[facility]



     acid,      - 09-05                          tablet by           ity of



     vitamin C,      00:00: 00:00                          mouth 3           [facility]

[facility]



     (VITAMIN C)      00   :00                           (three)           Medic

al



     500 mg                                         times           [facility]



     tablet                                         daily.           

 

     docusate       2019- No        [id number] 240mg      Take 1           

[facility]



     calcium 240       08-15                          capsule by           i

ty of



     mg capsule      00:00: 00:00                          mouth once           

[place]



               00   :00                           daily as           Medical



                                                  needed for           [facility]



                                                  Constipati           



                                                  on. May           



                                                  substitute           



                                                  for what           



                                                  is in           



                                                  stock and           



                                                  covered by           



                                                  patient           



                                                  plan           

 

     ferrous       2019- No        [id number] 325mg      Take 1           [facility]

n[facility]



     sulfate 325       08-15                          tablet by           it

y of



     mg (65 mg      00:00: 00:00                          mouth 3           [place]

[facility]



     iron)      00   :00                           (three)           Medical



     tablet                                         times           [facility]



                                                  daily with           



                                                  meals. May           



                                                  substitute           



                                                  for what           



                                                  is in           



                                                  stock and           



                                                  covered by           



                                                  patient           



                                                  plan           

 

     ibuprofen       2019- No        [id number] 600mg      Take 1          

 [facility]



     600 mg       08-15                          tablet by           ity of



     tablet      00:00: 00:00                          mouth           [place]



               00   :00                           every 6           Medical



                                                  (six)           [facility]



                                                  hours as           



                                                  needed for           



                                                  Pain           



                                                  (scale           



                                                  1-3) or           



                                                  Pain           



                                                  (scale           



                                                  4-6)           



                                                  (Pain).           



                                                  Take with           



                                                  food or           



                                                  milk.           

 

     foLIC acid       2019- No        [id number] 1mg       Take 1          

 [facility]



     1 mg tablet       08-15                          tablet by           it

y of



               00:00: 00:00                          mouth           [place]



               00   :00                           daily.           Medical



                                                                 [facility]

 

     HYDROcodone       2019- No        [id number] 1{tbl}      Take 1       

    [facility]



     -acetaminop      - 08-15                          tablet by           it

y of



     hen 5-325      00:00: 00:00                          mouth           [place]



     mg tablet      00   :00                           every 6           Medical



                                                  (six)           [facility]



                                                  hours as           



                                                  needed for           



                                                  Pain           



                                                  (scale           



                                                  7-10).           

 

     human      - 2019- No             .5mL      0.5 mL,           [facility]



     papillomav                                Intramuscu           it

y of



     vac,9-carmelo(P      11:42: 18:03                          lar,           [place]



     F)        18   :00                           ONCE-PRIOR           Medical



     (GARDASIL 9                                         TO             [facility]



     (PF)) vial                                         DISCHARGE,           



     0.5 mL                                         1 dose,           



                                                  Starting           



                                                  u            



                                                  19 at           



                                                  0642,           



                                                  Until           



                                                  Discontinu           



                                                  ed,            



                                                  Routine,           



                                                  Give           



                                                  vaccine           



                                                  prior to           



                                                  discharge           

 

     varicella       2019- No             .5mL      0.5 mL,           [facility]

ers



     virus                                Subcutaneo           ity of



     vaccine      11:42: 19:25                          ,            [place]



     live      08   :00                           ONCE-PRIOR           Medical



     (VARIVAX                                         TO             [facility]



     (PF))                                         DISCHARGE,           



     injection                                         1 dose,           



     0.5 mL                                         Starting           



                                                  Thu            



                                                  19 at           



                                                  0642,           



                                                  Until           



                                                  Discontinu           



                                                  ed,            



                                                  Routine,           



                                                  Give           



                                                  vaccine           



                                                  prior to           



                                                  discharge           

 

     HYDROcodone      -      Yes            2{tbl}      2 tablet,          

 [facility]



     -acetaminop      7-25                               Oral,           ity of



     hen (NORCO      05:14:                               Q6HPRN,           [place]

[facility]



     5) 5-325 mg      00                                 Starting           Medi

eliza



     tablet 2                                         Thu            [facility]



     tablet                                         19 at           



                                                  0014,           



                                                  Until           



                                                  Discontinu           



                                                  ed,            



                                                  Routine,           



                                                  Pain           



                                                  (scale           



                                                  7-10), If           



                                                  uncontroll           



                                                  ed by           



                                                  Ibuprofen           

 

     HYDROcodone      -      Yes            1{tbl}      1 tablet,          

 [facility]



     -acetaminop      7-25                               Oral,           ity of



     hen (NORCO      05:14:                               Q6HPRN,           [place]

[facility]



     5) 5-325 mg      00                                 Starting           Medi

eliza



     tablet 1                                         Thu            [facility]



     tablet                                         19 at           



                                                  0014,           



                                                  Until           



                                                  Discontinu           



                                                  ed,            



                                                  Routine,           



                                                  Pain           



                                                  (scale           



                                                  4-6), If           



                                                  uncontroll           



                                                  ed by           



                                                  Ibuprofen           

 

     ibuprofen      -0      Yes            600mg      600 mg,           [facility]

ers



     (IBU)      7-25                               Oral,           ity of



     tablet 600      05:14:                               Q6HPRN,           [place]

[facility]



     mg        00                                 Starting           Medical



                                                  Thu            [facility]



                                                  19 at           



                                                  0014,           



                                                  Until           



                                                  Discontinu           



                                                  ed,            



                                                  Routine,           



                                                  Pain           



                                                  (scale           



                                                  1-3)           

 

     diphenhydrA            Yes            25mg      25 mg,           [facility]

ers



     MINE      7-25                               Oral,           ity of



     (BENADRYL)      05:14:                               Q6HPRN,           [place]

[facility]



     tablet 25      00                                 Starting           [facility]

l



     mg                                           Thu            [facility]



                                                  19 at           



                                                  0014,           



                                                  Until           



                                                  Discontinu           



                                                  ed,            



                                                  Routine,           



                                                  Sleep,           



                                                  Itching           

 

     ondansetron      2019-0      Yes            4mg       4 mg, Slow           

[facility]



     (ZOFRAN      7-25                               IV Push,           ity of



     (PF))      05:14:                               Q8HPRN,           [place]



     injection 4      00                                 Starting           Medi

eliza



     mg                                           Thu            [facility]



                                                  19 at           



                                                  0014,           



                                                  Until           



                                                  Discontinu           



                                                  ed,            



                                                  Routine,           



                                                  Nausea and           



                                                  Vomiting           



                                                  (N/V)           

 

     bisacodyl      2019-      Yes            10mg      10 mg,           [facility]

s



     (DULCOLAX)      725                               Rectal,           ity of



     suppository      05:14:                               QDADAYPRN           

[place]



     10 mg      00                                 Starting           Medical



                                                  u            [facility]anch



                                                  19 at           



                                                  0014,           



                                                  Until           



                                                  Discontinu           



                                                  ed,            



                                                  Routine,           



                                                  Constipati           



                                                  on             

 

     simethicone            Yes            160mg      160 mg,           Un

kamlesh



     (GAS      25                               Oral,           ity of



     RELIEF)      05:14:                               PC+HSPRN,           [place]



     chewable      00                                 Starting           Medical



     tablet 160                                         [facility]



     mg                                           19 at           



                                                  0014,           



                                                  Until           



                                                  Discontinu           



                                                  ed,            



                                                  Routine,           



                                                  Gas            

 

     docusate            Yes            240mg      240 mg,           Unive

rs



     calcium      725                               Oral,           ity of



     (SURFAK)      05:14:                               Julio QURESHI

as



     capsule 240      00                                 Starting           Medi

eliza



     mg                                           Thu            Branch



                                                  19 at           



                                                  0014,           



                                                  Until           



                                                  Discontinu           



                                                  ed,            



                                                  Routine,           



                                                  Constipati           



                                                  on             

 

     magnesium            Yes            30mL      30 mL,           Univer

s



     hydroxide                                     Oral,           ity of



     (MILK OF      05:14:                               QDADAYPRJulio DOS SANTOS

as



     MAGNESIA)      00                                 Starting           Medica

l



     400 mg/5 mL                                         Thu            Branch



     suspension                                         19 at           



     30 mL                                         0014,           



                                                  Until           



                                                  Discontinu           



                                                  ed,            



                                                  Routine,           



                                                  Constipati           



                                                  on             

 

     naloxone       2019- No             .4mg      0.4 mg,           Unive

rs



     (NARCAN)       0727                          Slow IV           ity of



     injection      05:13: 05:12                          Push, PRN           [place]

[place]



     0.4 mg      52   :52                           - SEE           Medical



                                                  INSTRUCTIO           Branch



                                                  NS,            



                                                  Starting           



                                                  Thu            



                                                  19 at           



                                                  0013,           



                                                  Until Sat           



                                                  19 at           



                                                  0012,           



                                                  Routine,           



                                                  Analgesia           



                                                  Recovery,           



                                                  PACU           

 

     diphenhydrA      2019-0 2019- No             25mg      25 mg,           Uni

vers



     MINE                                Slow IV           ity of



     (BENADRYL)      05:13: 05:12                          Push,           [place]



     injection      52   :52                           Q4HPRN,           Medical



     25 mg                                         Starting           Branch



                                                  Thu            



                                                  19 at           



                                                  0013,           



                                                  Until Fri           



                                                  19 at           



                                                  0012,           



                                                  Routine,           



                                                  Itching,           



                                                  PACU           

 

     prenatal      2019- No                       Take by           Unive

rs



     vit                                 mouth.           ity of



     calc,iron,f      04:10: 00:00                                         [place]



     olic      56   :00                                          Medical



     (PRENATAL                                                        Branch



     VITAMIN                                                        



     ORAL)                                                        

 

     ferrous      2019- No             325mg      Take 325           Univ

ers



     sulfate 325                                mg by           ity of



     mg (65 mg      04:10: 00:00                          mouth 3           [place]

s



     iron)      56   :00                           (three)           Medical



     tablet                                         times           Branch



                                                  daily with           



                                                  meals.           

 

     azithromyci      2019- No             500mg      500 mg, IV         

  Univers



     n                                   Piggyback,           ity of



     (ZITHROMAX)      01:59: 05:07                          O.R.           [place]



     500 mg in      39   :00                           HOLDING           Medical



     NaCl 0.9%                                         ONCE, 1           Branch



     (NS) 250 mL                                         dose,           



     VIAL-MATE                                         Starting           



     IV                                           Wed            



     piggyback                                         19 at           



                                                  ,           



                                                  Until           



                                                  Discontinu           



                                                  ed, 250           



                                                  mL<br>Reas           



                                                  on for           



                                                  Anti-Infec           



                                                  tive:           



                                                  Surgical           



                                                  Prophylaxi           



                                                  s<br>Surgi           



                                                  eliza            



                                                  Prophylaxi           



                                                  s:             



                                                  OB/GYN&lt;           



                                                  br>Duratio           



                                                  n of           



                                                  therapy:           



                                                  within 24           



                                                  hours of           



                                                  surgery           

 

     ceFAZolin      2019- No             2000mg      2 g (2,000          

 Univers



     in dextrose      -25                          mg), IV           ity 

of



     (iso-os)      01:58: 02:08                          Piggyback,           [place]

[place]



     (ANCEF) 2      45   :00                           O.R.           Medical



     gram/100 mL                                         HOLDING           Branc

h



     Piggyback 2                                         ONCE, 1           



     g                                            dose,           



                                                  Starting           



                                                  19 at           



                                                  ,           



                                                  Until 19 at           



                                                  , 100           



                                                  mL<br>Reas           



                                                  on for           



                                                  Anti-Infec           



                                                  tive:           



                                                  Surgical           



                                                  Prophylaxi           



                                                  s<br>Surgi           



                                                  eliza            



                                                  Prophylaxi           



                                                  s:             



                                                  OB/GYN<br>           



                                                  Duration           



                                                  of             



                                                  therapy:           



                                                  within 24           



                                                  hours of           



                                                  surgery           

 

     sodium       2019- No             30mL      30 mL,           Univers



     citrate-cit                                Oral,           ity of



     brayan acid      01:58: 02:09                          PRE-PROCED           Te

[place]



     (BICITRA)      44   :00                           URE ONCE,           Medic

al



     500-334                                         1 dose,           Branch



     mg/5 mL                                         Starting           



     solution 30                                         Wed            



     mL                                           19 at           



                                                  2058,           



                                                  Until 19 at           



                                                  2109,           



                                                  Routine,           



                                                  Surgery           

 

     LR 1000 mL      2019- No             2mU/min      2              Uni

vers



     + oxytocin                                sally-unit           it

y of



     20 units IV      15:45: 05:14                          s/min (6           T

[place]



     Solution      16   :04                           mL/hr), at           Medic

al



                                                  6 mL/hr,           Branch



                                                  IV             



                                                  Infusion,           



                                                  TITRATE,           



                                                  Starting           



                                                  19 at           



                                                  1045,           



                                                  Until Thu           



                                                  19 at           



                                                  0014,           



                                                  ASAP,           



                                                  Oxytocin           



                                                  induction.           

 

     sodium       2019- No             30mL      30 mL,           Univers



     citrate-cit                                Oral,           ity of



     brayan acid      15:44: 00:09                          PRE-PROCED           Te

[place]



     (BICITRA)      10   :00                           URE ONCE,           Medic

al



     500-334                                         1 dose,           Branch



     mg/5 mL                                         Starting           



     solution 30                                         Wed            



     mL                                           19 at           



                                                  1044,           



                                                  Until           



                                                  Discontinu           



                                                  ed,            



                                                  Routine,           



                                                  Surgery/Pr           



                                                  ocedure           

 

     lactated      2019- No             500mL      at 999           Unive

rs



     ringers IV                                mL/hr, 500           it

y of



     infusion      15:44: 05:14                          mL, IV           [place]



     500 mL      10   :04                           Infusion,           Medical



                                                  PRN - SEE           Branch



                                                  INSTRUCTIO           



                                                  NS,            



                                                  Starting           



                                                  19 at           



                                                  1044,           



                                                  Until Thu           



                                                  19 at           



                                                  0014,           



                                                  Routine           

 

     No known                No                                      [facility]



     medications                                                        [facility]

 

     No known                No                                      [facility]



     medications                                                        [facility]







Vital Signs







             Vital Name   Observation Time Observation Value Comments     Source

 

             Systolic blood 2022 22:30:00 144 mm[Hg]                Univer

sity of



             [facility]

 

             Diastolic blood 2022 22:30:00 99 mm[Hg]                 Unive

rsity of



             [facility]

 

             Heart rate   2022 22:30:00 74 /min                   Universi

ty [facility]

 

             Respiratory rate 2022 22:30:00 16 /min                   [facility]

 

             Oxygen saturation in 2022 22:30:00 100 /min                  

[facility]



             Arterial blood by                                        [place] [facility]

eliza



             Pulse oximetry                                        Branch

 

             Body temperature 2022 18:32:00 36.83 Megha                 Univ

ersity of



                                                                 [place] [facility]



                                                                 Branch

 

             Body weight  2022 18:32:00 107.049 kg                Universi

ty of



                                                                 [place] [facility]



                                                                 Branch

 

             BMI          2022 18:32:00 35.88 kg/m2               Universi

ty of



                                                                 [place] [facility]



                                                                 Branch

 

             Systolic blood 2022 02:40:00 133 mm[Hg]                Univer

sity of



             pressure                                            [place] [facility]



                                                                 Branch

 

             Diastolic blood 2022 02:40:00 86 mm[Hg]                 Unive

rsity of



             pressure                                            [place] [facility]



                                                                 Branch

 

             Heart rate   2022 02:40:00 76 /min                   Universi

ty of



                                                                 [place] [facility]



                                                                 Branch

 

             Respiratory rate 2022 02:40:00 16 /min                   Univ

ersity of



                                                                 [place] [facility]



                                                                 Branch

 

             Oxygen saturation in 2022 02:40:00 99 /min                   

[facility]



             Arterial blood by                                        [place] [facility]

eliza



             Pulse oximetry                                        Branch

 

             Body temperature 2022 00:04:00 37.28 Megha                 Univ

ersity of



                                                                 [place] [facility]



                                                                 Branch

 

             Body height  2022 00:04:00 172.7 cm                  Universi

ty of



                                                                 [place] [facility]



                                                                 Branch

 

             Body weight  2022 00:04:00 107.049 kg                Universi

ty of



                                                                 [place] [facility]



                                                                 Branch

 

             BMI          2022 00:04:00 35.88 kg/m2               Universi

ty of



                                                                 [place] [facility]



                                                                 Branch

 

             Systolic blood 2021 05:42:00 134 mm[Hg]                Univer

sity of



             pressure                                            [place] [facility]



                                                                 Branch

 

             Diastolic blood 2021 05:42:00 68 mm[Hg]                 Unive

rsity of



             pressure                                            [place] [facility]



                                                                 Branch

 

             Heart rate   2021 05:42:00 107 /min                  Universi

ty of



                                                                 [place] [facility]



                                                                 Branch

 

             Body temperature 2021 05:42:00 38.11 Megha                 Univ

ersity of



                                                                 [place] [facility]



                                                                 Branch

 

             Respiratory rate 2021 05:42:00 18 /min                   Univ

ersity of



                                                                 [place] [facility]



                                                                 Branch

 

             Oxygen saturation in 2021 05:42:00 98 /min                   

[facility]



             Arterial blood by                                        [place] [facility]

eliza



             Pulse oximetry                                        Branch

 

             Body height  2021 03:25:00 172.7 cm                  Universi

ty of



                                                                 [place] [facility]



                                                                 Branch

 

             Body weight  2021 03:25:00 111.585 kg                Universi

ty of



                                                                 [place] [facility]



                                                                 Branch

 

             BMI          2021 03:25:00 37.40 kg/m2               Universi

ty of



                                                                 [place] [facility]



                                                                 Branch

 

             Systolic blood 2021 13:49:00 139 mm[Hg]                Univer

sity of



             pressure                                            [place] [facility]



                                                                 Branch

 

             Diastolic blood 2021 13:49:00 88 mm[Hg]                 Unive

rsity of



             pressure                                            [place] [facility]



                                                                 Branch

 

             Heart rate   2021 13:49:00 91 /min                   Universi

ty of



                                                                 [place] [facility]



                                                                 Branch

 

             Body temperature 2021 13:49:00 36.72 Megha                 Univ

ersity of



                                                                 [place] [facility]



                                                                 Branch

 

             Respiratory rate 2021 13:49:00 18 /min                   Univ

ersity of



                                                                 [place] [facility]



                                                                 Branch

 

             Body height  2021 13:49:00 172.7 cm                  Universi

ty of



                                                                 [place] [facility]



                                                                 Branch

 

             Body weight  2021 13:49:00 107.049 kg                Universi

ty of



                                                                 [place] [facility]



                                                                 Branch

 

             BMI          2021 13:49:00 35.88 kg/m2               Universi

ty of



                                                                 [place] [facility]



                                                                 Branch

 

             Oxygen saturation in 2021 13:49:00 99 /min                   

[facility]



             Arterial blood by                                        [place] [facility]

eliza



             Pulse oximetry                                        Branch

 

             Systolic blood 2021 06:52:00 123 mm[Hg]                Univer

sity of



             pressure                                            [place] [facility]



                                                                 Branch

 

             Diastolic blood 2021 06:52:00 82 mm[Hg]                 Unive

rsity of



             pressure                                            [place] [facility]



                                                                 Branch

 

             Heart rate   2021 06:52:00 62 /min                   Universi

ty of



                                                                 [place] [facility]



                                                                 Branch

 

             Body temperature 2021 06:52:00 36.72 Megha                 [facility]

[facility]



                                                                 [facility]



                                                                 [facility]

 

             Respiratory rate 2021 06:52:00 16 /min                   [facility]iv

[facility]



                                                                 [facility]



                                                                 [facility]

 

             Body weight  2021 06:52:00 107.049 kg                [facility]

ty of



                                                                 [facility]



                                                                 [facility]

 

             BMI          2021 06:52:00 35.88 kg/m2               [facility]

ty of



                                                                 [facility]



                                                                 [facility]anch

 

             Oxygen saturation in 2021 06:52:00 99 /min                   

[facility]



             Arterial blood by                                        [facility]

[facility]



             Pulse oximetry                                        Branch

 

             Systolic blood 2021 05:38:00 172 mm[Hg]                [facility]iver

[facility]ity of



             pressure                                            [facility]



                                                                 [facility]anch

 

             Diastolic blood 2021 05:38:00 105 mm[Hg]                Unive

[facility]ity of



             pressure                                            [facility]



                                                                 [facility]anch

 

             Heart rate   2021 05:38:00 74 /min                   [facility]

ty of



                                                                 [facility]



                                                                 [facility]

 

             Respiratory rate 2021 05:38:00 16 /min                   Univ

[facility]



                                                                 [facility]



                                                                 [facility]

 

             Oxygen saturation in 2021 05:00:00 97 /min                   

[facility]



             Arterial blood by                                        [facility]

[facility]



             Pulse oximetry                                        Branch

 

             Body temperature 2021 03:02:00 37.06 Megha                 Un[facility]

[facility]



                                                                 [facility]



                                                                 [facility]

 

             Body height  2021 03:02:00 172.7 cm                  [facility]

ty of



                                                                 [facility]



                                                                 [facility]

 

             Body weight  2021 03:02:00 107.049 kg                [facility]

ty of



                                                                 [facility]



                                                                 [facility]

 

             BMI          2021 03:02:00 35.88 kg/m2               [facility]

ty of



                                                                 [facility]



                                                                 [facility]

 

             Heart rate   2021 16:59:00 89 /min                   [facility]

ty of



                                                                 [facility]



                                                                 [facility]

 

             Body temperature 2021 16:59:00 37.17 Megha                 [facility]

[facility]



                                                                 [facility]



                                                                 [facility]

 

             Respiratory rate 2021 16:59:00 18 /min                   [facility]iv

[facility]



                                                                 [facility]



                                                                 [facility]

 

             Body weight  2021 16:59:00 107.049 kg                [facility]

ty of



                                                                 [facility]



                                                                 [facility]

 

             BMI          2021 16:59:00 35.88 kg/m2               [facility]

ty of



                                                                 [facility]



                                                                 [facility]

 

             Oxygen saturation in 2021 16:59:00 99 /min                   

[facility]



             Arterial blood by                                        [facility]

[facility]



             Pulse oximetry                                        Branch

 

             Systolic blood 2021-10-29 19:00:00 122 mm[Hg]                [facility]

[facility]ity of



             pressure                                            [facility]



                                                                 [facility]

 

             Diastolic blood 2021-10-29 19:00:00 76 mm[Hg]                 Unive

[facility] of



             pressure                                            [facility]



                                                                 [facility]

 

             Heart rate   2021-10-29 19:00:00 54 /min                   [facility]

ty of



                                                                 [facility]



                                                                 [facility]

 

             Oxygen saturation in 2021-10-29 19:00:00 100 /min                  

[facility]



             Arterial blood by                                        [facility]

[facility]



             Pulse oximetry                                        Branch

 

             Respiratory rate 2021-10-29 18:00:00 18 /min                   Univ

[facility] of



                                                                 [facility]



                                                                 [facility]

 

             Body temperature 2021-10-29 14:24:00 36.72 Megha                 [facility]

[facility] of



                                                                 [facility]



                                                                 [facility]

 

             Body weight  2021-10-29 14:24:00 107.049 kg                [facility]

ty of



                                                                 [facility]



                                                                 [facility]

 

             BMI          2021-10-29 14:24:00 35.88 kg/m2               [facility]

ty of



                                                                 [facility]



                                                                 [facility]

 

             Systolic blood 2020 00:00:00 119 mm[Hg]                [facility]

[facility]ity of



             pressure                                            [facility]



                                                                 [facility]

 

             Diastolic blood 2020 00:00:00 79 mm[Hg]                 Unive

[facility] of



             pressure                                            [facility]



                                                                 [facility]

 

             Heart rate   2020 00:00:00 84 /min                   [facility]

ty of



                                                                 [facility]



                                                                 [facility]

 

             Body temperature 2020 00:00:00 36.83 Megha                 [facility]

[facility]



                                                                 [facility]



                                                                 [facility]

 

             Respiratory rate 2020 00:00:00 22 /min                   [facility]iv

[facility] of



                                                                 [facility]



                                                                 [facility]

 

             Oxygen saturation in 2020 00:00:00 100 /min                  

[facility]



             Arterial blood by                                        [facility]

[facility]



             Pulse oximetry                                        Branch

 

             Body weight  2020 22:45:00 107.049 kg                [facility]

ty of



                                                                 [facility]



                                                                 [facility]

 

             BMI          2020 22:45:00 35.88 kg/m2               [facility]

ty of



                                                                 [facility]



                                                                 [facility]

 

             Systolic blood 2020 20:41:00 134 mm[Hg]                [facility]

[facility]ity of



             pressure                                            [facility]



                                                                 [facility]

 

             Diastolic blood 2020 20:41:00 87 mm[Hg]                 [facility]

[facility] of



             pressure                                            [facility]



                                                                 [facility]

 

             Heart rate   2020 20:41:00 91 /min                   [facility]

ty of



                                                                 [facility]



                                                                 [facility]

 

             Body temperature 2020 20:41:00 37.22 Megha                 [facility]

[facility]



                                                                 [facility]



                                                                 [facility]

 

             Respiratory rate 2020 20:41:00 18 /min                   [facility]iv

[facility]



                                                                 [facility]



                                                                 [facility]

 

             Body weight  2020 20:41:00 105.235 kg                [facility]

ty of



                                                                 [facility]



                                                                 [facility]

 

             BMI          2020 20:41:00 35.28 kg/m2               [facility]

ty of



                                                                 [facility]



                                                                 [facility]

 

             Oxygen saturation in 2020 20:41:00 99 /min                   

[facility]



             Arterial blood by                                        [facility]

[facility]



             Pulse oximetry                                        Branch

 

             Oxygen saturation in 2019 15:45:00 99 /min                   

[facility]



             Arterial blood by                                        [facility]

[facility]



             Pulse oximetry                                        Branch

 

             Systolic blood 2019 15:15:00 123 mm[Hg]                [facility]iver

[facility]ity of



             pressure                                            [facility]



                                                                 [facility]

 

             Diastolic blood 2019 15:15:00 86 mm[Hg]                 Unive

[facility]ity of



             pressure                                            [facility]



                                                                 [facility]

 

             Heart rate   2019 15:15:00 74 /min                   [facility]

ty of



                                                                 [facility]



                                                                 [facility]

 

             Respiratory rate 2019 15:15:00 17 /min                   Univ

ers[facility] of



                                                                 [facility]



                                                                 [facility]

 

             Body temperature 2019 14:00:00 36.22 Megha                 Univ

[facility] of



                                                                 [facility]



                                                                 Branch

 

             Body height  2019 11:12:00 172.7 cm                  [facility]

ty of



                                                                 [facility]



                                                                 [facility]

 

             Body weight  2019 11:12:00 109 kg                    [facility]

ty of



                                                                 [facility]



                                                                 [facility]

 

             BMI          2019 11:12:00 36.54 kg/m2               Universi

ty of



                                                                 [facility]



                                                                 Branch

 

             Systolic blood 2019-08-15 15:26:00 142 mm[Hg]                Univer

[facility]ity of



             pressure                                            [facility]



                                                                 [facility]

 

             Diastolic blood 2019-08-15 15:26:00 93 mm[Hg]                 [facility]

[facility] of



             pressure                                            [facility]



                                                                 [facility]

 

             Heart rate   2019-08-15 15:26:00 72 /min                   [facility]

ty of



                                                                 [facility]



                                                                 [facility]

 

             Body temperature 2019-08-15 15:26:00 35.28 Megha                 [facility]

[facility] of



                                                                 [facility]



                                                                 [facility]

 

             Respiratory rate 2019-08-15 15:26:00 16 /min                   Univ

ers[facility] of



                                                                 [facility]



                                                                 Branch

 

             Body height  2019-08-15 15:26:00 172.7 cm                  [facility]

ty of



                                                                 [facility]



                                                                 [facility]

 

             Body weight  2019-08-15 15:26:00 105.8 kg                  [facility]

ty of



                                                                 [facility]



                                                                 [facility]

 

             BMI          2019-08-15 15:26:00 35.47 kg/m2               Universi

ty of



                                                                 [facility]

 

             Oxygen saturation in 2019-08-15 15:26:00 100 /min                  

[facility]



             Arterial blood by                                        [facility]

[facility]



             Pulse oximetry                                        Branch

 

             Systolic blood 2019 15:07:00 120 mm[Hg]                Univer

[facility]ity of



             pressure                                            [facility]



                                                                 [facility]

 

             Diastolic blood 2019 15:07:00 82 mm[Hg]                 Unive

rs[facility] of



             pressure                                            [facility]



                                                                 [facility]

 

             Heart rate   2019 15:06:00 68 /min                   [facility]

ty of



                                                                 [facility]



                                                                 [facility]

 

             Body temperature 2019 15:06:00 36.67 Megha                 Univ

ers[facility] of



                                                                 [facility]



                                                                 Branch

 

             Respiratory rate 2019 15:06:00 16 /min                   Univ

ers[facility] of



                                                                 [facility]



                                                                 [facility]

 

             Body height  2019 15:06:00 172.7 cm                  [facility]

ty of



                                                                 [facility]



                                                                 [place]

 

             Body weight  2019 15:06:00 106.369 kg                [facility]

ty of



                                                                 [facility]



                                                                 [facility]anch

 

             BMI          2019 15:06:00 35.66 kg/m2               [facility]

ty of



                                                                 [facility]



                                                                 [facility]

 

             Systolic blood 2019 16:16:00 124 mm[Hg]                Univer

sity of



             pressure                                            [facility]



                                                                 [facility]anch

 

             Diastolic blood 2019 16:16:00 78 mm[Hg]                 Unive

rsity of



             pressure                                            [facility]

 

             Heart rate   2019 16:11:00 87 /min                   Univers[facility]

ty of



                                                                 [facility]



                                                                 [place]

 

             Body temperature 2019 16:11:00 37.22 Megha                 [facility]



                                                                 [facility]

 

             Respiratory rate 2019 16:11:00 16 /min                   Univ

ers[facility] of



                                                                 [facility]

 

             Body height  2019 16:11:00 172.7 cm                  Univers[facility]

ty of



                                                                 [facility]



                                                                 [place]

 

             Body weight  2019 16:11:00 110.791 kg                [facility]

ty of



                                                                 [facility]



                                                                 [facility]anch

 

             BMI          2019 16:11:00 37.14 kg/m2               Univers[facility]

ty of



                                                                 [facility]



                                                                 [facility]

 

             Systolic blood 2019 13:00:00 122 mm[Hg]                Univer

sity of



             [facility]

 

             Diastolic blood 2019 13:00:00 78 mm[Hg]                 Unive

rsity of



             [facility]

 

             Heart rate   2019 13:00:00 80 /min                   Universi

ty of



                                                                 [facility]



                                                                 [place]

 

             Body temperature 2019 13:00:00 36.61 Megha                 [facility]

 

             Respiratory rate 2019 13:00:00 20 /min                   [facility]

 

             Oxygen saturation in 2019 13:00:00 99 /min                   

[facility]



             Arterial blood by                                        [facility]

[facility]



             Pulse oximetry                                        Branch







Procedures







                Procedure       Date / Time     Performing Clinician Source



                                Performed                       

 

                US PELVIS COMPLETE WITH 2022 22:18:00 Chris Narvaez

[facility]



                TRANSVAGINAL                                    [facility]

 

                COMP. METABOLIC PANEL 2022 19:43:00 Chris Narvaez

[facility]



                ([id number])                                         Medical [place]

 

                CBC WITH DIFF   2022 19:43:00 Chris Narvaez o

f [facility]

 

                URINALYSIS      2022 19:43:00 Chris Narvaez o

f [facility]

 

                ADC CLC OR [facility] ONLY - 2022 19:43:00 Chris Narvaez

[facility]



                WET PREP                                        [facility]

 

                CONSENT/REFUSAL FOR 2022 18:24:53 Doctor Unassigned, No [facility] 

[place]



                DIAGNOSIS AND TREATMENT                 Name            [facility]

 

                POCT PREGNANCY TEST 2022 01:40:00 Sloan Nelson [facility]

 

                URINALYSIS      2022 01:38:00 Sloan Nelson [facility]

 

                RAPID INFLUENZA A/B 2021 03:32:00 [employer] [facility]

 

                COVID-19 (ID NOW RAPID 2021 03:32:00 [employer] [facility]

[facility]



                TESTING)                                        Medical Branch

 

                CONSENT/REFUSAL FOR 2021 03:00:49 Doctor Unassigned, No [facility] 

[place]



                DIAGNOSIS AND TREATMENT                 Name            [facility] 

Branch

 

                CONSENT/REFUSAL FOR 2021 13:45:43 Doctor Unassigned, No [facility]

[facility] 

[place]



                DIAGNOSIS AND TREATMENT                 Name            [facility]

 

                EMERGENCY DEPARTMENT 2021 06:01:00 Doctor Unassigned, No [facility]

[facility] 

[place]



                DOCUMENTS                       Name            [facility]

 

                CT SOFT TISSUE NECK W 2021 03:55:42 Cynthia Fitch

[facility]



                CONTRAST                                        [facility]

 

                PREGNANCY TEST, SERUM 2021 03:36:00 Cynthia Fitch

[facility]

 

                BASIC METABOLIC PANEL 2021 03:36:00 [facility]

[facility]



                (NA, K, CL, CO2,                                 Medical Branch



                GLUCOSE, BUN,                                   



                CREATININE, CA)                                 

 

                CBC WITH DIFF   2021 03:36:00 Cynthia Fitch [facility]

 

                NOTICE OF PRIVACY 2021 02:57:07 Doctor Unassigned, No [facility]

[facility] [place]



                PRACTICES                       Name            [facility]

 

                CONSENT/REFUSAL FOR 2021 02:55:44 Doctor Unassigned, No Un

[facility] 

[place]



                DIAGNOSIS AND TREATMENT                 Name            [facility]

 

                RAPID STREP SCREEN FOR 2021 17:03:00 Chris Narvaez

[facility]



                GROUP A                                         Medical Branch

 

                CONSENT/REFUSAL FOR 2021 16:53:00 Doctor Unassigned, No Un

[facility] 

[place]



                DIAGNOSIS AND TREATMENT                 Name            Medical 

Branch

 

                CT ABDOMEN PELVIS W 2021-10-29 17:50:39 Bharti Fuentes [facility]



                CONTRAST                                        [facility]

 

                US PELVIS COMPLETE WITH 2021-10-29 16:38:35 Bharti Fuentes [facility]



                TRANSVAGINAL                                    Medical Branch

 

                POCT PREGNANCY TEST 2021-10-29 14:34:00 Bharti Fuentes [facility]



                                                                Medical Branch

 

                LIPASE          2021-10-29 14:31:00 [facility]o [facility]

 

                COMP. METABOLIC PANEL 2021-10-29 14:31:00 Bharti Fuentes [facility]



                ([id number])                                         Medical [place]

 

                CBC WITH DIFF   2021-10-29 14:31:00 Fu[facility]

 

                URINALYSIS      2021-10-29 14:31:00 Alfredo [facility]

 

                COVID-19 (ID NOW RAPID 2021-10-29 14:31:00 Bharti Fuentes [facility]



                TESTING)                                        Medical Branch

 

                NOTICE OF PRIVACY 2021-10-29 14:20:02 Doctor Unassigned, No [facility]

[facility]            Medical Branch

 

                CONSENT/REFUSAL FOR 2021-10-29 14:19:50 Doctor Unassigned, No [facility]

[facility] 

[place]



                DIAGNOSIS AND TREATMENT                 Name            [facility]

 

                RAPID STREP SCREEN FOR 2020 23:28:00 [facility]obal [facility]



                [facility]                                         Medical Branch

 

                POCT PREGNANCY TEST 2020 23:01:00 Cristobal Rodrigues [facility]

 

                NOTICE OF PRIVACY 2020 22:36:17 Doctor Unassigned, No [facility]



                PRACTICES                       Name            Medical Branch

 

                CONSENT/REFUSAL FOR 2020 22:36:05 Doctor Unassigned, No Un

[facility] 

[place]



                DIAGNOSIS AND TREATMENT                 Name            Medical 

Branch

 

                INDIRECT ANTIGLOBULIN 2019 11:30:00 Katy Jones [facility]



                TEST                            Hannah           [facility]

 

                ASSIGNMENT OF BENEFITS 2019 09:55:52 Doctor Unassigned, No

 [facility]

 

                TYPE AND SCREEN 2019-08-15 15:52:00 Lizet Sanderscarter  [facility]

 

                CBC WITH DIFFERENTIAL 2019-08-15 15:51:00 [facility]

 

                ASSIGNMENT OF BENEFITS 2019-08-15 15:15:47 Doctor Unassigned, No

 [facility]

 

                DAY SURGERY - [place] 2019-08-15 05:01:00 Doctor Unassigned, N

o [facility]

 

                CBC WITH DIFFERENTIAL 2019 07:36:00 Pancho   [facility]

 

                VENOUS CORD GAS 2019 03:29:00 Claudia, [facility]

 

                 SECTION 2019 02:20:00 Edson Pierce [facility]

 

                URIC ACID       2019 02:06:00 [place] [facility]

 

                CBC WITH DIFFERENTIAL 2019 02:06:00 Filomena [facility]

 

                SGOT (ASPARTATE AMINO 2019 02:06:00 [facility]



                TRANSFER)                                       Medical Branch

 

                CREATININE      2019 02:06:00 [facility]

 

                ALANINE AMINO   2019 02:06:00 [facility]



                TRANSFERASE(SGPT                                 Medical Branch

 

                LACTATE DEHYDROGENASE 2019 02:06:00 Filomena [facility]

 

                URINALYSIS      2019 02:05:00 [facility]

 

                PROTEIN CREAT RATIO 2019 02:05:00 [facility]



                URINE RANDOM                                    Medical Branch

 

                CBC WITH DIFFERENTIAL 2019 16:13:00 Claudia, [facility]

 

                HEPATITIS B SURFACE 2019 16:13:00 Claudia, Brennan        [facility]

[facility] [place]



                ANTIGEN                                         [facility]

 

                GALV ONLY - SYPHILIS 2019 16:13:00 Claudia, [facility]



                IGG/IGM                                         Medical Branch

 

                TYPE AND SCREEN 2019 15:54:00 Brennan Taylor o

f [facility]







Encounters







        Start   End     Encounter Admission Attending Care    Care    Encounter 

Source



        Date/Time Date/Time Type    Type    Clinicians Facility Department ID   

   

 

        2021-10-30         Emergency                 [facility]    [id number] 

Univers



        07[facility]04[facility]42                                                         [facility]

 

        2021-10-28         Emergency                 [facility]    [id number] 

Univers



        23:03:26                                                         [facility]

 

        2023 Outpatient                 SFA     SFA     [id number]

023 Michael



        14:53:18 14:53:18                                         0912    F



                                                                        [place]

 

        2022 [facility]       [facility]     [id number]

34 Univers



        13:33:00 18:01:00                 CHRIS                         cristina [facility]

 

        2022 Emergency         [facility]    [id number]

4190 Univers



        13:33:00 18:01:00                 Chris MANZANARES 350.1.13.10         i

ty of



                                                [place] 4.2.7.2.686         [facility]  [phone]         [address]

 

        2022 Emergency X       NITA [facility]    ERT     [id number]

07 Univers



        19:06:00 21:48:00                 SLOAN                          [facility]

 

        2022 Emergency         [facility]    [id number]

2788 Univers



        19:06:00 21:48:00                 Sloan MANZANARES 350.1.13.10         

ity [facility] 4.2.7.2.686         [facility]  [phone]         [address]

 

        2021 Emergency X       PORSCHE POWER [facility]    ERT     [id number]

2044 Univers



        21:27:00 23:45:00                                                 ity [facility]

 

        2021 Emergency         PORSCHE Power [facility]    1.2.840.114 [id number]

[id number] Univers



        21:27:00 23:45:00                 Carol MANZANARES 350.1.13.10         i

ty of



                                                [place] 4.2.7.2.686         [facility]  [phone]         Medi

eliza



                                                        084             Branch

 

        2021 Emergency X       SINGER, [facility]    ERT     [id number]

02 Univers



        07:49:00 08:30:00                 CHRIS moulton [facility]

 

        2021 Emergency         Singer, [facility]    [id number]

9273 Univers



        07:49:00 08:30:00                 Chris MANZANARES 350.1.13.10         i

ty of



                                                [facility] 4.2.7.2.686         [facility]  [phone]         Medi

eliza



                                                        084             Branch

 

        2021 Emergency         Creighton, TRAUMA  1.2.840.114 [id number]

[id number] Univers



        00:53:00 02:30:00                 [facility]  350.1.13.10         it

y of



                                                        4.2.7.2.686         [place]

[place]



                                                        [phone]         Medi

eliza



                                                        014             Branch

 

        2021 Orders          Doctor  ALEKSEY    1.2.840.114 [id number]

77 Univers



        00:00:00 00:00:00 Only            Unassigned, SHERRIE   350.1.13.10       

  ity of



                                        [facility] 4.2.7.2.686         Julio

as



                                                        [phone]         Medi

eliza



                                                        009             Branch

 

        2021 Emergency X       FITCH, [facility]    ERT     [id number]

561 Univers



        21:05:00 23:46:00                 CYNTHIA moulton [facility]

 

        2021 Emergency X       FITCH, [facility]    ERT     [id number]

665 Univers



        21:05:00 23:46:00                 CYNTHAI moulton [facility]

 

        2021 Emergency         Fitch, [facility]    1.2.840.114 [id number]

[id number] Univers



        21:05:00 23:46:00                 Cynthia MANZANARES 350.1.13.10         i

ty of



                                                [facility] 4.2.7.2.686         [facility]  [phone]         Medi

eliza



                                                        084             Branch

 

        2021 Emergency X       SINGER, [facility]    ERT     [id number]

35 Univers



        11:03:00 12:01:00                 CHRIS moulton [facility]

 

        2021 Emergency         [facility]    [id number]

6143 Univers



        11:03:00 12:01:00                 Chris MANZANARES 350.1.13.10         i

ty of



                                                [place] 4.2.7.2.686         [facility]  [phone]         [address]

 

        2021 Orders          Doctor  ALEKSEY    1.2.840.114 [id number]

39 Univers



        00:00:00 00:00:00 Only            Unassigned, SHERRIE   350.1.13.10       

  ity of



                                        [facility] [facility] 4.2.7.2.686         Julio

as



                                                        [phone]         [address]

 

        2021-10-29 2021-10-29 Emergency X       [facility]    ERT     [id number]

42 Univers



        09:23:00 14:05:00                 BHARTI moulton [facility]

 

        2021-10-29 2021-10-29 Emergency         [facility]    [id number]

7153 Univers



        09:23:00 14:05:00                 Bharti MANZANARES 350.1.13.10         i

ty of



                                                [place] 4.2.7.2.686         [facility]  [phone]         [address]

 

        2021-10-29 2021-10-29 Orders          Doctor  ALEKSEY    1.2.840.114 [id number]

46 Univers



        00:00:00 00:00:00 Only            Unassigned, SHERRIE   350.1.13.10       

  ity of



                                        [facility] 4.2.7.2.686         Julio

as



                                                        [phone]         [address]

 

        2020 Telephone         ALEKSEY Steele    [id number]

0681 Univers



        00:00:00 00:00:00                 Fabienne ALTAMIRANO   350.1.13.10         it

y of



                                                HOSPITAL 4.2.7.2.686         Julio

as



                                                        [phone]         [address]

 

        2020 Emergency         [facility]    1.2.840.114 [id number]

[id number] Univers



        16:49:00 18:31:00                 Cristobal Manzanares 350.1.13.10        

 ity of



                                                [place] 4.2.7.2.686         [place]

[facility]  [phone]         Medi

eliza



                                                        084             Branch

 

        2020 Orders          Doctor  ALEKSEY    1.2.840.114 [id number]

68 Univers



        00:00:00 00:00:00 Only            Unassigned, SHERRIE   350.1.13.10       

  ity of



                                        [facility] 4.2.7.2.686         Julio

as



                                                        [phone]         Medi

eliza



                                                        009             Branch

 

        2020 Outpatient R       FATMATA [facility]    [id number]

[id number] Univers



        11:00:00 11:00:00                 OMAYEMI                         ity of



                                                                        [facility]

 

        2020 Telephone         Tess [facility]    1.2.840.114 

[id number] Univers



        00:00:00 00:00:00                 , Jasmin [facility]  350.1.13.10       

  ity of



                                        CIRILO Manzanares 4.2.7.2.686         Julio

as



                                                [employer]io [phone]         [address]



                                                Office                  



                                                Building                 



                                                One                     

 

        2020 Urgent          Pob1, [facility]    1.

2.840.114 [id number] 

Univers



        15:27:21 15:47:21 Care            Jasmin Pulido [facility]  350.1

.13.10         ity of



                                                [place] 4.2.7.2.686         Julio

as



                                                [employer]io [phone]         [address]



                                                Office                  



                                                Building                 



                                                One                     

 

        2020 Outpatient R               [facility]    [id number]

423 Univers



        15:40:00 15:40:00                                                 ity of



                                                                        [facility]

 

        2019 Hospital         Jo Ann Batista  [id number] [id number]

9052 Univers



        04:56:19 10:45:00 Encounter         Prem Calvoy   350.1.13.10         

ity of



                                                [facility] 4.2.7.2.686         Julio

as



                                                        [phone]         Medi

eliza



                                                        104             Branch

 

        2019 Orders          Doctor RYDER    1.2.840.114 [id number]

35 Univers



        00:00:00 00:00:00 Only            Unassigned, SHERRIE   350.1.13.10       

  ity of



                                        [facility] 4.2.7.2.686         Julio

as



                                                        [phone]         Medi

eliza



                                                        009             [place]

 

        2019-08-15 2019-08-15 Hospital         Jo Ann Batista  [id number]

3615 Univers



        10:17:00 13:55:00 Encounter         Prem SIERRA  [place]   350.1.13.10         

ity of



                                                Hospital 4.2.7.2.686         Julio

as



                                                        [phone]         Medi

eliza



                                                        101             Branch

 

        2019-08-15 2019-08-15 Orders          Doctor  ALEKSEY    1.2.840.114 [id number]

83 Univers



        00:00:00 00:00:00 Only            Unassigned, SHERRIE   350.1.13.10       

  ity of



                                        [facility] 4.2.7.2.686         Julio

as



                                                        [phone]         Medi

eliza



                                                        009             [place]

 

        2019-08-15 2019-08-15 Prep For         HOA Jones 1.2.840.114

 [id number] 

Univers



        00:00:00 00:00:00 Surgery         [facility] 350.1.13.10         i

ty of



                                        [facility] 4.2.7.2.686         [place]

[place]



                                                        [phone]         Medi

eliza



                                                        113             Branch

 

        2019 Routine         Rahul [facility]    1.2.840.114 [id number]

94 Univers



        09:50:02 10:35:34 Prenatal         Crystal [facility]  350.1.13.10       

  ity of



                        Visit                   REGIONAL 4.2.7.2.686         Julio

as



                                                MATERNAL [phone]         Med

ical



                                                & CHILD [address] [place] [address]                 

 

        2019 Nurse           [facility]    1.2

.840.114 [id number] 

Univers



        10:43:43 11:33:58 Visit           Harleen Cho OB/GY[facility]  350.1.13.

10         ity of



                                                REGIONAL 4.2.7.2.686         Julio

as



                                                MATERNAL [phone]         Med

ical



                                                & CHILD [address] [place] [address]                 

 

        2019 Hospital         ALEKSEY Núñez    1.2.840.114 [id number]

458 Univers



        09:36:00 15:52:00 Encounter         Shanell ALTAMIRANO   350.1.13.10        

 ity of



                                                [facility] 4.2.7.2.686         Julio

as



                                                        [phone]         Medi

eliza



                                                        063             Branch

 

        2019 Prep For         MARI Sanders 1.2.840.114 [id number]

[id number] [facility]



        00:00:00 00:00:00 Surgery         Carlos Eduardo [facility] 350.1.13.10         i

ty of



                                                CLINICS 4.2.7.2.686         [place]

[place]



                                                        [phone]         Medi

eliza



                                                        113             Branch







Results







           Test Description Test Time  Test Comments Results    Result Comments 

Source









                    COMP. METABOLIC PANEL (16976) 2022 20:15:18 









                      Test Item  Value      Reference Range Interpretation Comme

nts









             NA (test code = 7725524636) 140 mmol/L   135-145                   

 

             K (test code = 0313565483) 3.7 mmol/L   3.5-5.0                   

 

             CL (test code = 1999236129) 103 mmol/L                       

 

             CO2 TOTAL (test code = 8097363545) 29 mmol/L    23-31              

       

 

             AGAP (test code = 2815198928)              2-16                    

  

 

             BUN (test code = 3194728403) 13 mg/dL     7-23                     

 

 

             GLUCOSE (test code = 3732585149) 75 mg/dL                    

     

 

             CREATININE (test code = 0.93 mg/dL   0.50-1.04                 



             0242569262)                                         

 

             TOTAL BILI (test code = 0.4 mg/dL    0.1-1.1                   



             5565530824)                                         

 

             CALCIUM (test code = 9937472090) 8.5 mg/dL    8.6-10.6     L       

     

 

             T PROTEIN (test code = 1386826671) 8.1 g/dL     6.3-8.2            

       

 

             ALBUMIN (test code = 6387650420) 4.3 g/dL     3.5-5.0              

     

 

             ALK PHOS (test code = 2047558227) 80 U/L                     

      

 

             ALTv (test code = 1742-6) 15 U/L       5-35                      

 

             AST(SGOT) (test code = 6254507474) 29 U/L       13-40              

       

 

             eGFR (test code = 4447390395)              mL/min/1.73m2           

   

 

             ALFONZO (test code = ALFONZO) Association of Glomerular                    

       



                          Filtration Rate (GFR) and Staging                     

      



                          of Kidney Disease*                           



                          +-----------------------+--------                     

      



                          -------------+-------------------                     

      



                          ------+| GFR (mL/min/1.73 m2) ?|                      

     



                          With Kidney Damage ?| ?Without                        

   



                          Kidney                                 



                          Damage+-----------------------+--                     

      



                          -------------------+-------------                     

      



                          ------------+| ?>90 ? ? ? ? ? ? ?                     

      



                          ? ?| ?Stage one ? ? ? ? ?| ?                          

 



                          Normal ? ? ? ? ? ? ?                           



                          ?+-----------------------+-------                     

      



                          --------------+------------------                     

      



                          -------+| ?60-89 ? ? ? ? ? ? ? ?|                     

      



                          ?Stage two ? ? ? ? ?| ? Decreased                     

      



                          GFR ? ? ? ?                            



                          +-----------------------+--------                     

      



                          -------------+-------------------                     

      



                          ------+| ?30-59 ? ? ? ? ? ? ? ?|                      

     



                          ?Stage three ? ? ? ?| ? Stage                         

  



                          three ? ? ? ? ?                           



                          +-----------------------+--------                     

      



                          -------------+-------------------                     

      



                          ------+| ?15-29 ? ? ? ? ? ? ? ?|                      

     



                          ?Stage four ? ? ? ? | ? Stage                         

  



                          four ? ? ? ? ?                           



                          ?+-----------------------+-------                     

      



                          --------------+------------------                     

      



                          -------+| ?<15 (or dialysis) ? ?|                     

      



                          ?Stage five ? ? ? ? | ? Stage                         

  



                          five ? ? ? ? ?                           



                          ?+-----------------------+-------                     

      



                          --------------+------------------                     

      



                          -------+ *Each stage assumes the                      

     



                          associated GFR level has been in                      

     



                          effect for at least three months.                     

      



                          ?Stages 1 to 5, with or without                       

    



                          kidney disease, indicate chronic                      

     



                          kidney disease. Notes:                           



                          Determination of stages one and                       

    



                          two (with eGFR >59mL/min/1.73 m2)                     

      



                          requires estimation of kidney                         

  



                          damage for at least three months                      

     



                          as defined by structural or                           



                          functional abnormalities of the                       

    



                          kidney, manifested by                           



                          either:Pathological abnormalities                     

      



                          or Markers of kidney damage                           



                          (including abnormalities in the                       

    



                          composition of the blood or urine                     

      



                          or abnormalities in imaging                           



                          tests).                                

 

             Lab Interpretation (test code = Abnormal                           

    



             90916-0)                                            



[facility] WITH [id number] 19:53:31





             Test Item    Value        Reference Range Interpretation Comments

 

             WBC (test code =              See_Comment                [Automated



             6190-2)                                             message] The sy

stem



                                                                 which generated



                                                                 this result



                                                                 transmitted



                                                                 reference range

:



                                                                 4.30 - 11.10



                                                                 10*3/?L. The



                                                                 reference range

 was



                                                                 not used to



                                                                 interpret this



                                                                 result as



                                                                 normal/abnormal

.

 

             RBC (test code =              See_Comment                [Automated



             349-8)                                              message] The sy

stem



                                                                 which generated



                                                                 this result



                                                                 transmitted



                                                                 reference range

:



                                                                 3.93 - 5.25



                                                                 10*6/?L. The



                                                                 reference range

 was



                                                                 not used to



                                                                 interpret this



                                                                 result as



                                                                 normal/abnormal

.

 

             HGB (test code = 9.7 g/dL     11.6-15.0    L            



             718-7)                                              

 

             HCT (test code = 32.3 %       35.7-45.2    L            



             4544-3)                                             

 

             MCV (test code = 76.0 fL      80.6-95.5    L            



             787-2)                                              

 

             MCH (test code = 22.8 pg      25.9-32.8    L            



             785-6)                                              

 

             MCHC (test code = 30.0 g/dL    31.6-35.1    L            



             786-4)                                              

 

             RDW-SD (test code = 50.2 fL      39.0-49.9    H            



             84861-4)                                            

 

             RDW-CV (test code = 18.1 %       12.0-15.5    H            



             788-0)                                              

 

             PLT (test code =              See_Comment  H             [Automated



             777-3)                                              message] The sy

stem



                                                                 which generated



                                                                 this result



                                                                 transmitted



                                                                 reference range

:



                                                                 166 - 358 10*3/

?L.



                                                                 The reference r

luis



                                                                 was not used to



                                                                 interpret this



                                                                 result as



                                                                 normal/abnormal

.

 

             MPV (test code = 9.8 fL       9.5-12.9                  



             14246-9)                                            

 

             NRBC/100 WBC (test              See_Comment                [Automat

ed



             code = 7778774533)                                        message] 

The system



                                                                 which generated



                                                                 this result



                                                                 transmitted



                                                                 reference range

:



                                                                 0.0 - 10.0 /100



                                                                 WBCs. The refer

ence



                                                                 range was not u

sed



                                                                 to interpret th

is



                                                                 result as



                                                                 normal/abnormal

.

 

             NRBC x10^3 (test code <0.01        See_Comment                [Auto

mated



             = [id number])                                        message] The s

ystem



                                                                 which generated



                                                                 this result



                                                                 transmitted



                                                                 reference range

:



                                                                 10*3/?L. The



                                                                 reference range

 was



                                                                 not used to



                                                                 interpret this



                                                                 result as



                                                                 normal/abnormal

.

 

             GRAN MAT (NEUT) % 51.2 %                                 



             (test code = 770-8)                                        

 

             IMM GRAN % (test code 0.20 %                                 



             = [id number])                                        

 

             LYMPH % (test code = 36.1 %                                 



             736-9)                                              

 

             MONO % (test code = 9.4 %                                  



             5905-5)                                             

 

             EOS % (test code = 2.4 %                                  



             713-8)                                              

 

             BASO % (test code = 0.7 %                                  



             706-2)                                              

 

             GRAN MAT x10^3(ANC) 2.95 10*3/uL 1.88-7.09                 



             (test code =                                        



             1517740704)                                         

 

             IMM GRAN x10^3 (test <0.03        0.00-0.06                 



             code = 4148491281)                                        

 

             LYMPH x10^3 (test code 2.08 10*3/uL 1.32-3.29                 



             = 731-0)                                            

 

             MONO x10^3 (test code 0.54 10*3/uL 0.33-0.92                 



             = 742-7)                                            

 

             EOS x10^3 (test code = 0.14 10*3/uL 0.03-0.39                 



             711-2)                                              

 

             BASO x10^3 (test code 0.04 10*3/uL 0.01-0.07                 



             = 704-7)                                            

 

             Lab Interpretation Abnormal                               



             (test code = 65272-2)                                        



[id number]





             Test Item    Value        Reference Range Interpretation Comments

 

             POCT PREG (test code = 1605) negative                              

 

 

             On board controls acceptable with present                          

      



             C Line (test code = 3574)                                        

 

             POCT PREG LOT # (test code = 3575) [id number]                      

       

 

             POCT PREG TEST  DATE (test                         

     



             code = 3576)                                        

 

             Lab Interpretation (test code = Normal                             

    



             95181-4)                                            



[facility] [id number] 03[facility]59:03





             Test Item    Value        Reference Range Interpretation Comments

 

             PREG SERUM (test code Negative                               



             = 4950304848)                                        

 

             ALFONZO (test code = ALFONZO) Less than 10 IU/L. ?If                       

    



                          low titer or ectopic                           



                          pregnancy is suspected,                           



                          resubmit specimen in                           



                          48-72 hours.                           



[facility] METABOLIC PANEL (NA, K, CL, CO2, 
GLUCOSE, BUN, CREATININE, CA)2021 03:55:47





             Test Item    Value        Reference Range Interpretation Comments

 

             NA (test code = 135 mmol/L   135-145                   



             0412790316)                                         

 

             K (test code = 4.8 mmol/L   3.5-5.0                   



             8759563297)                                         

 

             CL (test code = 102 mmol/L                       



             8191836978)                                         

 

             CO2 TOTAL (test code = 25 mmol/L    23-31                     



             6491435265)                                         

 

             AGAP (test code =              2-16                      



             2405701728)                                         

 

             BUN (test code = 12 mg/dL     7-23                      



             7815862709)                                         

 

             GLUCOSE (test code = 129 mg/dL           H            



             4518734290)                                         

 

             CREATININE (test code = 0.70 mg/dL   0.50-1.04                 



             8201223376)                                         

 

             CALCIUM (test code = 9.1 mg/dL    8.6-10.6                  



             1091320714)                                         

 

             eGFR (test code =              mL/min/1.73m2              



             6249911681)                                         

 

             ALFONZO (test code = ALFONZO) Association of                           



                          Glomerular Filtration                           



                          Rate (GFR) and Staging                           



                          of Kidney Disease*                           



                          +---------------------                           



                          --+-------------------                           



                          --+-------------------                           



                          ------+| GFR                           



                          (mL/min/1.73 m2) ?|                           



                          With Kidney Damage ?|                           



                          ?Without Kidney                           



                          Damage+---------------                           



                          --------+-------------                           



                          --------+-------------                           



                          ------------+| ?>90 ?                           



                          ? ? ? ? ? ? ? ?|                           



                          ?Stage one ? ? ? ? ?|                           



                          ? Normal ? ? ? ? ? ? ?                           



                          ?+--------------------                           



                          ---+------------------                           



                          ---+------------------                           



                          -------+| ?60-89 ? ? ?                           



                          ? ? ? ? ?| ?Stage two                           



                          ? ? ? ? ?| ? Decreased                           



                          GFR ? ? ? ?                            



                          +---------------------                           



                          --+-------------------                           



                          --+-------------------                           



                          ------+| ?30-59 ? ? ?                           



                          ? ? ? ? ?| ?Stage                           



                          three ? ? ? ?| ? Stage                           



                          three ? ? ? ? ?                           



                          +---------------------                           



                          --+-------------------                           



                          --+-------------------                           



                          ------+| ?15-29 ? ? ?                           



                          ? ? ? ? ?| ?Stage four                           



                          ? ? ? ? | ? Stage four                           



                          ? ? ? ? ?                              



                          ?+--------------------                           



                          ---+------------------                           



                          ---+------------------                           



                          -------+| ?<15 (or                           



                          dialysis) ? ?| ?Stage                           



                          five ? ? ? ? | ? Stage                           



                          five ? ? ? ? ?                           



                          ?+--------------------                           



                          ---+------------------                           



                          ---+------------------                           



                          -------+ *Each stage                           



                          assumes the associated                           



                          GFR level has been in                           



                          effect for at least                           



                          three months. ?Stages                           



                          1 to 5, with or                           



                          without kidney                           



                          disease, indicate                           



                          chronic kidney                           



                          disease. Notes:                           



                          Determination of                           



                          stages one and two                           



                          (with eGFR                             



                          >59mL/min/1.73 m2)                           



                          requires estimation of                           



                          kidney damage for at                           



                          least three months as                           



                          defined by structural                           



                          or functional                           



                          abnormalities of the                           



                          kidney, manifested by                           



                          either:Pathological                           



                          abnormalities or                           



                          Markers of kidney                           



                          damage (including                           



                          abnormalities in the                           



                          composition of the                           



                          blood or urine or                           



                          abnormalities in                           



                          imaging tests).                           

 

             Lab Interpretation Abnormal                               



             (test code = 76235-9)                                        



[facility] WITH [id number] 03:44:45





             Test Item    Value        Reference Range Interpretation Comments

 

             WBC (test code =              See_Comment                [Automated



             9377-2)                                             message] The sy

stem



                                                                 which generated



                                                                 this result



                                                                 transmitted



                                                                 reference range

:



                                                                 4.30 - 11.10



                                                                 10*3/?L. The



                                                                 reference range

 was



                                                                 not used to



                                                                 interpret this



                                                                 result as



                                                                 normal/abnormal

.

 

             RBC (test code =              See_Comment                [Automated



             917-8)                                              message] The sy

stem



                                                                 which generated



                                                                 this result



                                                                 transmitted



                                                                 reference range

:



                                                                 3.93 - 5.25



                                                                 10*6/?L. The



                                                                 reference range

 was



                                                                 not used to



                                                                 interpret this



                                                                 result as



                                                                 normal/abnormal

.

 

             HGB (test code = 9.6 g/dL     11.6-15.0    L            



             718-7)                                              

 

             HCT (test code = 31.1 %       35.7-45.2    L            



             4544-3)                                             

 

             MCV (test code = 76.4 fL      80.6-95.5    L            



             787-2)                                              

 

             MCH (test code = 23.6 pg      25.9-32.8    L            



             785-6)                                              

 

             MCHC (test code = 30.9 g/dL    31.6-35.1    L            



             786-4)                                              

 

             RDW-SD (test code = 45.8 fL      39.0-49.9                 



             73012-3)                                            

 

             RDW-CV (test code = 16.6 %       12.0-15.5    H            



             788-0)                                              

 

             PLT (test code =              See_Comment                [Automated



             777-3)                                              message] The sy

stem



                                                                 which generated



                                                                 this result



                                                                 transmitted



                                                                 reference range

:



                                                                 166 - 358 10*3/

?L.



                                                                 The reference r

luis



                                                                 was not used to



                                                                 interpret this



                                                                 result as



                                                                 normal/abnormal

.

 

             MPV (test code = 9.7 fL       9.5-12.9                  



             71787-9)                                            

 

             NRBC/100 WBC (test              See_Comment                [Automat

ed



             code = 2226124091)                                        message] 

The system



                                                                 which generated



                                                                 this result



                                                                 transmitted



                                                                 reference range

:



                                                                 0.0 - 10.0 /100



                                                                 WBCs. The refer

ence



                                                                 range was not u

sed



                                                                 to interpret th

is



                                                                 result as



                                                                 normal/abnormal

.

 

             NRBC x10^3 (test code <0.01        See_Comment                [Auto

mated



             = [id number])                                        message] The s

ystem



                                                                 which generated



                                                                 this result



                                                                 transmitted



                                                                 reference range

:



                                                                 10*3/?L. The



                                                                 reference range

 was



                                                                 not used to



                                                                 interpret this



                                                                 result as



                                                                 normal/abnormal

.

 

             GRAN MAT (NEUT) % 49.9 %                                 



             (test code = 770-8)                                        

 

             IMM GRAN % (test code 0.20 %                                 



             = 1743043328)                                        

 

             LYMPH % (test code = 38.3 %                                 



             736-9)                                              

 

             MONO % (test code = 9.2 %                                  



             5905-5)                                             

 

             EOS % (test code = 1.7 %                                  



             713-8)                                              

 

             BASO % (test code = 0.7 %                                  



             706-2)                                              

 

             GRAN MAT x10^3(ANC) 3.00 10*3/uL 1.88-7.09                 



             (test code =                                        



             7079182285)                                         

 

             IMM GRAN x10^3 (test <0.03        0.00-0.06                 



             code = 2146869480)                                        

 

             LYMPH x10^3 (test code 2.30 10*3/uL 1.32-3.29                 



             = 731-0)                                            

 

             MONO x10^3 (test code 0.55 10*3/uL 0.33-0.92                 



             = 742-7)                                            

 

             EOS x10^3 (test code = 0.10 10*3/uL 0.03-0.39                 



             711-2)                                              

 

             BASO x10^3 (test code 0.04 10*3/uL 0.01-0.07                 



             = 704-7)                                            

 

             Lab Interpretation Abnormal                               



             (test code = 57238-1)                                        



[facility]39





             Test Item    Value        Reference Range Interpretation Comments

 

             NA (test code = 137 mmol/L   135-145                   



             0690913147)                                         

 

             K (test code = 3.8 mmol/L   3.5-5.0                   



             4493924032)                                         

 

             CL (test code = 105 mmol/L                       



             7095464083)                                         

 

             CO2 TOTAL (test code = 25 mmol/L    23-31                     



             8203516507)                                         

 

             AGAP (test code =              2-16                      



             4530426813)                                         

 

             BUN (test code = 9 mg/dL      7-23                      



             5858822848)                                         

 

             GLUCOSE (test code = 126 mg/dL           H            



             5429772488)                                         

 

             CREATININE (test code = 0.76 mg/dL   0.50-1.04                 



             9382167523)                                         

 

             TOTAL BILI (test code = 0.4 mg/dL    0.1-1.1                   



             6533951707)                                         

 

             CALCIUM (test code = 9.1 mg/dL    8.6-10.6                  



             9714491356)                                         

 

             T PROTEIN (test code = 7.5 g/dL     6.3-8.2                   



             3798412210)                                         

 

             ALBUMIN (test code = 4.0 g/dL     3.5-5.0                   



             4545704938)                                         

 

             ALK PHOS (test code = 68 U/L                           



             3012598731)                                         

 

             ALTv (test code = 13 U/L       5-35                      



             1742-6)                                             

 

             AST(SGOT) (test code = 23 U/L       13-40                     



             4661657682)                                         

 

             eGFR (test code =              mL/min/1.73m2              



             5479450729)                                         

 

             ALFONZO (test code = ALFONZO) Association of                           



                          Glomerular Filtration                           



                          Rate (GFR) and Staging                           



                          of Kidney Disease*                           



                          +---------------------                           



                          --+-------------------                           



                          --+-------------------                           



                          ------+| GFR                           



                          (mL/min/1.73 m2) ?|                           



                          With Kidney Damage ?|                           



                          ?Without Kidney                           



                          Damage+---------------                           



                          --------+-------------                           



                          --------+-------------                           



                          ------------+| ?>90 ?                           



                          ? ? ? ? ? ? ? ?|                           



                          ?Stage one ? ? ? ? ?|                           



                          ? Normal ? ? ? ? ? ? ?                           



                          ?+--------------------                           



                          ---+------------------                           



                          ---+------------------                           



                          -------+| ?60-89 ? ? ?                           



                          ? ? ? ? ?| ?Stage two                           



                          ? ? ? ? ?| ? Decreased                           



                          GFR ? ? ? ?                            



                          +---------------------                           



                          --+-------------------                           



                          --+-------------------                           



                          ------+| ?30-59 ? ? ?                           



                          ? ? ? ? ?| ?Stage                           



                          three ? ? ? ?| ? Stage                           



                          three ? ? ? ? ?                           



                          +---------------------                           



                          --+-------------------                           



                          --+-------------------                           



                          ------+| ?15-29 ? ? ?                           



                          ? ? ? ? ?| ?Stage four                           



                          ? ? ? ? | ? Stage four                           



                          ? ? ? ? ?                              



                          ?+--------------------                           



                          ---+------------------                           



                          ---+------------------                           



                          -------+| ?<15 (or                           



                          dialysis) ? ?| ?Stage                           



                          five ? ? ? ? | ? Stage                           



                          five ? ? ? ? ?                           



                          ?+--------------------                           



                          ---+------------------                           



                          ---+------------------                           



                          -------+ *Each stage                           



                          assumes the associated                           



                          GFR level has been in                           



                          effect for at least                           



                          three months. ?Stages                           



                          1 to 5, with or                           



                          without kidney                           



                          disease, indicate                           



                          chronic kidney                           



                          disease. Notes:                           



                          Determination of                           



                          stages one and two                           



                          (with eGFR                             



                          >59mL/min/1.73 m2)                           



                          requires estimation of                           



                          kidney damage for at                           



                          least three months as                           



                          defined by structural                           



                          or functional                           



                          abnormalities of the                           



                          kidney, manifested by                           



                          either:Pathological                           



                          abnormalities or                           



                          Markers of kidney                           



                          damage (including                           



                          abnormalities in the                           



                          composition of the                           



                          blood or urine or                           



                          abnormalities in                           



                          imaging tests).                           

 

             Lab Interpretation Abnormal                               



             (test code = 84508-8)                                        



[facility]38





             Test Item    Value        Reference Range Interpretation Comments

 

             LIPASE (test code = 1306390753) 78 U/L       0-220                 

    

 

             Lab Interpretation (test code = Normal                             

    



             43839-2)                                            



[facility]41:38





             Test Item    Value        Reference Range Interpretation Comments

 

             WBC (test code =              See_Comment                [Automated



             8415-2)                                             message] The sy

stem



                                                                 which generated



                                                                 this result



                                                                 transmitted



                                                                 reference range

:



                                                                 4.30 - 11.10



                                                                 10*3/?L. The



                                                                 reference range

 was



                                                                 not used to



                                                                 interpret this



                                                                 result as



                                                                 normal/abnormal

.

 

             RBC (test code =              See_Comment                [Automated



             801-3)                                              message] The sy

stem



                                                                 which generated



                                                                 this result



                                                                 transmitted



                                                                 reference range

:



                                                                 3.93 - 5.25



                                                                 10*6/?L. The



                                                                 reference range

 was



                                                                 not used to



                                                                 interpret this



                                                                 result as



                                                                 normal/abnormal

.

 

             HGB (test code = 9.6 g/dL     11.6-15.0    L            



             718-7)                                              

 

             HCT (test code = 31.4 %       35.7-45.2    L            



             4544-3)                                             

 

             MCV (test code = 76.2 fL      80.6-95.5    L            



             787-2)                                              

 

             MCH (test code = 23.3 pg      25.9-32.8    L            



             785-6)                                              

 

             MCHC (test code = 30.6 g/dL    31.6-35.1    L            



             786-4)                                              

 

             RDW-SD (test code = 46.2 fL      39.0-49.9                 



             33075-0)                                            

 

             RDW-CV (test code = 16.8 %       12.0-15.5    H            



             788-0)                                              

 

             PLT (test code =              See_Comment                [Automated



             777-3)                                              message] The sy

stem



                                                                 which generated



                                                                 this result



                                                                 transmitted



                                                                 reference range

:



                                                                 166 - 358 10*3/

?L.



                                                                 The reference r

luis



                                                                 was not used to



                                                                 interpret this



                                                                 result as



                                                                 normal/abnormal

.

 

             MPV (test code = 9.7 fL       9.5-12.9                  



             43038-3)                                            

 

             NRBC/100 WBC (test              See_Comment                [Automat

ed



             code = 0448953974)                                        message] 

The system



                                                                 which generated



                                                                 this result



                                                                 transmitted



                                                                 reference range

:



                                                                 0.0 - 10.0 /100



                                                                 WBCs. The refer

ence



                                                                 range was not u

sed



                                                                 to interpret th

is



                                                                 result as



                                                                 normal/abnormal

.

 

             NRBC x10^3 (test code <0.01        See_Comment                [Auto

mated



             = 5856523673)                                        message] The s

ystem



                                                                 which generated



                                                                 this result



                                                                 transmitted



                                                                 reference range

:



                                                                 10*3/?L. The



                                                                 reference range

 was



                                                                 not used to



                                                                 interpret this



                                                                 result as



                                                                 normal/abnormal

.

 

             GRAN MAT (NEUT) % 50.0 %                                 



             (test code = 770-8)                                        

 

             IMM GRAN % (test code 0.20 %                                 



             = 7440060826)                                        

 

             LYMPH % (test code = 39.3 %                                 



             736-9)                                              

 

             MONO % (test code = 7.1 %                                  



             5905-5)                                             

 

             EOS % (test code = 2.8 %                                  



             713-8)                                              

 

             BASO % (test code = 0.6 %                                  



             706-2)                                              

 

             GRAN MAT x10^3(ANC) 2.67 10*3/uL 1.88-7.09                 



             (test code =                                        



             5974658850)                                         

 

             IMM GRAN x10^3 (test <0.03        0.00-0.06                 



             code = 7474969260)                                        

 

             LYMPH x10^3 (test code 2.10 10*3/uL 1.32-3.29                 



             = 731-0)                                            

 

             MONO x10^3 (test code 0.38 10*3/uL 0.33-0.92                 



             = 742-7)                                            

 

             EOS x10^3 (test code = 0.15 10*3/uL 0.03-0.39                 



             711-2)                                              

 

             BASO x10^3 (test code 0.03 10*3/uL 0.01-0.07                 



             = 704-7)                                            

 

             Lab Interpretation Abnormal                               



             (test code = 18578-0)                                        



[facility] Pregnancy Test2021-10-29 14:34:00





             Test Item    Value        Reference Range Interpretation Comments

 

             POCT PREG (test code = 1605) negative                              

 

 

             On board controls acceptable with present                          

      



             C Line (test code = 3574)                                        

 

             POCT PREG LOT # (test code = 3575) [id number]                      

       

 

             POCT PREG TEST  DATE (test                                   

     



             code = 3576)                                        

 

             Lab Interpretation (test code = Normal                             

    



             49696-3)                                            



[facility]  
23:58:00





             Test Item    Value        Reference Range Interpretation Comments

 

             Streptococcus pyogenes (group A) Positive     Negative     A       

     



             antigen (test code = 78937-7)                                      

  

 

             Lab Interpretation (test code = Abnormal                           

    



             08915-3)                                            



[facility] PREGNANCY [id number] 23:01:00





             Test Item    Value        Reference Range Interpretation Comments

 

             POCT PREG (test code = 1605) negative                              

 

 

             POCT PREG LOT # (test code = 3575) [id number]                      

       

 

             POCT PREG TEST  DATE (test 2022                        

     



             code = 3576)                                        

 

             Lab Interpretation (test code = Normal                             

    



             61283-7)                                            



[facility] [id number] 13:02:18





             Test Item    Value        Reference Range Interpretation Comments

 

             IAT (test code = Negative                               Performed a

t [facility]



             [address])                                               Laboratory Serv

ices - GAL



                                                                 Blood [address]

[place], [place]



                                                                 [id number] Free:



                                                                 [phone]

A No.



                                                                 [id number]



[facility] WITH DIFFERENTIAL2019-08-15 16:43:00





             Test Item    Value        Reference Range Interpretation Comments

 

             WBC (test code =              See_Comment  L             [Automated



             6690-2)                                             message] The sy

stem



                                                                 which generated



                                                                 this result



                                                                 transmitted



                                                                 reference range

:



                                                                 4.30 - 11.10



                                                                 10*3/?L. The



                                                                 reference range

 was



                                                                 not used to



                                                                 interpret this



                                                                 result as



                                                                 normal/abnormal

.

 

             RBC (test code =              See_Comment                [Automated



             789-8)                                              message] The sy

stem



                                                                 which generated



                                                                 this result



                                                                 transmitted



                                                                 reference range

:



                                                                 3.93 - 5.25



                                                                 10*6/?L. The



                                                                 reference range

 was



                                                                 not used to



                                                                 interpret this



                                                                 result as



                                                                 normal/abnormal

.

 

             HGB (test code = 10.6 g/dL    11.6-15      L            



             718-7)                                              

 

             HCT (test code = 36.1 %       35.7-45.2                 



             4544-3)                                             

 

             MCV (test code = 78.5 fL      80.6-95.5    L            



             787-2)                                              

 

             MCH (test code = 23.0 pg      25.9-32.8    L            



             785-6)                                              

 

             MCHC (test code = 29.4 g/dL    31.6-35.1    L            



             786-4)                                              

 

             RDW-SD (test code = 54.3 fL      39-49.9      H            



             96826-4)                                            

 

             RDW-CV (test code = 19.1 %       12-15.5      H            



             788-0)                                              

 

             PLT (test code =              See_Comment                [Automated



             777-3)                                              message] The sy

stem



                                                                 which generated



                                                                 this result



                                                                 transmitted



                                                                 reference range

:



                                                                 166 - 358 10*3/

?L.



                                                                 The reference r

luis



                                                                 was not used to



                                                                 interpret this



                                                                 result as



                                                                 normal/abnormal

.

 

             MPV (test code = 11.1 fL      9.5-12.9                  



             20396-2)                                            

 

             NRBC/100 WBC (test              See_Comment                [Automat

ed



             code = 1801878811)                                        message] 

The system



                                                                 which generated



                                                                 this result



                                                                 transmitted



                                                                 reference range

:



                                                                 0.0 - 10.0 /100



                                                                 WBCs. The refer

ence



                                                                 range was not u

sed



                                                                 to interpret th

is



                                                                 result as



                                                                 normal/abnormal

.

 

             NRBC x10^3 (test code <0.01        See_Comment                [Auto

mated



             = 2991528694)                                        message] The s

ystem



                                                                 which generated



                                                                 this result



                                                                 transmitted



                                                                 reference range

:



                                                                 10*3/?L. The



                                                                 reference range

 was



                                                                 not used to



                                                                 interpret this



                                                                 result as



                                                                 normal/abnormal

.

 

             GRAN MAT (NEUT) % 39.5 %                                 



             (test code = 770-8)                                        

 

             IMM GRAN % (test code 0.00 %                                 



             = 5228224987)                                        

 

             LYMPH % (test code = 48.6 %                                 



             736-9)                                              

 

             MONO % (test code = 7.7 %                                  



             5905-5)                                             

 

             EOS % (test code = 2.9 %                                  



             713-8)                                              

 

             BASO % (test code = 1.3 %                                  



             706-2)                                              

 

             GRAN MAT x10^3(ANC) 1.23 10*3/uL 1.88-7.09    L            



             (test code =                                        



             4966716435)                                         

 

             IMM GRAN x10^3 (test <0.03        0-0.06                    



             code = 5651524702)                                        

 

             LYMPH x10^3 (test code 1.51 10*3/uL 1.32-3.29                 



             = 731-0)                                            

 

             MONO x10^3 (test code 0.24 10*3/uL 0.33-0.92    L            



             = 742-7)                                            

 

             EOS x10^3 (test code = 0.09 10*3/uL 0.03-0.39                 



             711-2)                                              

 

             BASO x10^3 (test code 0.04 10*3/uL 0.01-0.07                 



             = 704-7)                                            

 

             ELLIPTO/OVAL (test 2+           See_Comment  A             [Automat

ed



             code = 17672-5)                                        message] The

 system



                                                                 which generated



                                                                 this result



                                                                 transmitted



                                                                 reference range

:



                                                                 (none). The



                                                                 reference range

 was



                                                                 not used to



                                                                 interpret this



                                                                 result as



                                                                 normal/abnormal

.

 

             Lab Interpretation Abnormal                               



             (test code = 85041-6)                                        



[facility] The Type and Screen expires 
at midnight on the 3rd day after it was drawn. A current Type and Screen is 
required when RBCs are requested. For all other blood products, a Type and Scree
n performed during the current hospitalizati...2019-08-15 16:35:07





             Test Item    Value        Reference Range Interpretation Comments

 

             ABO & RH (test code O POSITIVE                             Performe

d at [facility]



             = 20)                                               Laboratory Serv

ices -



                                                                 [facility]

[address] [place] [address]

s



                                                                 [id number] Free:



                                                                 [phone]

A No.



                                                                 [id number]

 

             IAT (test code = Negative                               Performed a

t [facility]



             1185)                                               Laboratory Serv

ices -



                                                                 [facility]

[address] [place] [address]

s



                                                                 [id number] Free:



                                                                 [phone]

A No.



                                                                 [id number]



[facility]/[id number] 
14:50:00





             Test Item    Value        Reference Range Interpretation Comments

 

             Syphilis IgG/IgM (test Non-reactive Non-reactive              



             code = 94065-0)                                        

 

             ALFONZO (test code = ALFONZO) Non-reactive - No                           



                          serologic evidence                           



                          of T. pallidum                           



                          infection. Cannot                           



                          exclude incubating                           



                          or early syphilis.                           



                          Submit a second                           



                          specimen in 2-4                           



                          weeks if syphilis is                           



                          clinically                             



                          suspected.Equivocal                           



                          - Further testing to                           



                          follow.Reactive -                           



                          Further testing to                           



                          follow.                                

 

             Lab Interpretation (test Normal                                 



             code = 16699-7)                                        



[facility] WITH [id number] 07:57:00





             Test Item    Value        Reference Range Interpretation Comments

 

             WBC (test code =              See_Comment                [Automated



             8490-2)                                             message] The sy

stem



                                                                 which generated



                                                                 this result



                                                                 transmitted



                                                                 reference range

:



                                                                 4.30 - 11.10



                                                                 10*3/?L. The



                                                                 reference range

 was



                                                                 not used to



                                                                 interpret this



                                                                 result as



                                                                 normal/abnormal

.

 

             RBC (test code =              See_Comment  L             [Automated



             789-8)                                              message] The sy

stem



                                                                 which generated



                                                                 this result



                                                                 transmitted



                                                                 reference range

:



                                                                 3.93 - 5.25



                                                                 10*6/?L. The



                                                                 reference range

 was



                                                                 not used to



                                                                 interpret this



                                                                 result as



                                                                 normal/abnormal

.

 

             HGB (test code = 7.9 g/dL     11.6-15      L            



             718-7)                                              

 

             HCT (test code = 27.1 %       35.7-45.2    L            



             4544-3)                                             

 

             MCV (test code = 79.9 fL      80.6-95.5    L            



             787-2)                                              

 

             MCH (test code = 23.3 pg      25.9-32.8    L            



             785-6)                                              

 

             MCHC (test code = 29.2 g/dL    31.6-35.1    L            



             786-4)                                              

 

             RDW-SD (test code = 56.2 fL      39-49.9      H            



             84525-3)                                            

 

             RDW-CV (test code = 19.7 %       12-15.5      H            



             788-0)                                              

 

             PLT (test code =              See_Comment  L             [Automated



             777-3)                                              message] The sy

stem



                                                                 which generated



                                                                 this result



                                                                 transmitted



                                                                 reference range

:



                                                                 166 - 358 10*3/

?L.



                                                                 The reference r

luis



                                                                 was not used to



                                                                 interpret this



                                                                 result as



                                                                 normal/abnormal

.

 

             MPV (test code = 10.3 fL      9.5-12.9                  



             27570-5)                                            

 

             NRBC/100 WBC (test              See_Comment                [Automat

ed



             code = 7747440931)                                        message] 

The system



                                                                 which generated



                                                                 this result



                                                                 transmitted



                                                                 reference range

:



                                                                 0.0 - 10.0 /100



                                                                 WBCs. The refer

ence



                                                                 range was not u

sed



                                                                 to interpret th

is



                                                                 result as



                                                                 normal/abnormal

.

 

             NRBC x10^3 (test code <0.01        See_Comment                [Auto

mated



             = 4001464103)                                        message] The s

ystem



                                                                 which generated



                                                                 this result



                                                                 transmitted



                                                                 reference range

:



                                                                 10*3/?L. The



                                                                 reference range

 was



                                                                 not used to



                                                                 interpret this



                                                                 result as



                                                                 normal/abnormal

.

 

             GRAN MAT (NEUT) % 76.9 %                                 



             (test code = 770-8)                                        

 

             IMM GRAN % (test code 0.60 %                                 



             = 9974052707)                                        

 

             LYMPH % (test code = 14.2 %                                 



             736-9)                                              

 

             MONO % (test code = 7.6 %                                  



             5905-5)                                             

 

             EOS % (test code = 0.3 %                                  



             713-8)                                              

 

             BASO % (test code = 0.4 %                                  



             706-2)                                              

 

             GRAN MAT x10^3(ANC) 5.96 10*3/uL 1.88-7.09                 



             (test code =                                        



             0523885425)                                         

 

             IMM GRAN x10^3 (test 0.05 10*3/uL 0-0.06                    



             code = 0698249234)                                        

 

             LYMPH x10^3 (test code 1.10 10*3/uL 1.32-3.29    L            



             = 731-0)                                            

 

             MONO x10^3 (test code 0.59 10*3/uL 0.33-0.92                 



             = 742-7)                                            

 

             EOS x10^3 (test code = <0.03        0.03-0.39    L            



             711-2)                                              

 

             BASO x10^3 (test code 0.03 10*3/uL 0.01-0.07                 



             = 704-7)                                            

 

             Lab Interpretation Abnormal                               



             (test code = 98317-2)                                        



[id number] [facility]:33:00





             Test Item    Value        Reference Range Interpretation Comments

 

             BASE EXCESS, CORD              mEq/L                     



             (test code =                                        



             5835319365)                                         

 

             AC PH, CORD (BEAKER)              7.18-7.38                 



             (test code =                                        



             2026546994)                                         

 

             PC02, CORD (test code              See_Comment                [Auto

mated message] The



             = 1922786573)                                        system which g

enerated this



                                                                 result transmit

tisha



                                                                 reference range

: 32 - 66



                                                                 mmHg. The refer

ence range



                                                                 was not used to

 interpret



                                                                 this result as



                                                                 normal/abnormal

.

 

             PO2, CORD (test code              See_Comment                [Autom

ated message] The



             = 0531686154)                                        system which g

enerated this



                                                                 result transmit

tisha



                                                                 reference range

: 10 - 30



                                                                 mmHg. The refer

ence range



                                                                 was not used to

 interpret



                                                                 this result as



                                                                 normal/abnormal

.

 

             BICARBONATE, CORD              See_Comment                [Automate

d message] The



             (test code =                                        system which ge

nerated this



             7025922207)                                         result transmit

tisha



                                                                 reference range

: 17 - 27



                                                                 mEq/L. The refe

rence range



                                                                 was not used to

 interpret



                                                                 this result as



                                                                 normal/abnormal

.



[id number]





             Test Item    Value        Reference Range Interpretation Comments

 

             VENOUS BASE EXCESS,              mEq/L                     



             CORD (test code =                                        



             5345453981)                                         

 

             VENOUS PH, CORD (test              7.25-7.45                 



             code = 5786655887)                                        

 

             VENOUS PC02, CORD              See_Comment                [Automate

d message] The



             (test code =                                        system which ge

nerated



             2132522501)                                         this result tra

nsmitted



                                                                 reference range

: 27 - 49



                                                                 mmHg. The refer

ence range



                                                                 was not used to

 interpret



                                                                 this result as



                                                                 normal/abnormal

.

 

             VENOUS PO2, CORD (test              See_Comment                [Aut

omated message] The



             code = 5543602246)                                        system wh

[facility] generated



                                                                 this result tra

nsmitted



                                                                 reference range

: 17 - 41



                                                                 mmHg. The refer

ence range



                                                                 was not used to

 interpret



                                                                 this result as



                                                                 normal/abnormal

.

 

             VENOUS BICARBONATE,              See_Comment                [Automa

tisha message] The



             CORD (test code =                                        system whi

ch generated



             1108090836)                                         this result tra

nsmitted



                                                                 reference range

: 12 - 29



                                                                 mEq/L. The refe

rence range



                                                                 was not used to

 interpret



                                                                 this result as



                                                                 normal/abnormal

.



[facility]





             Test Item    Value        Reference Range Interpretation Comments

 

             APPEARANCE (test code = Hazy         Clear        A            



             8238785622)                                         

 

             COLOR (test code = Yellow       Yellow                    



             9571038093)                                         

 

             PH (test code =              4.8-8.0                   



             0022105105)                                         

 

             SP GRAVITY (test code =              1.003-1.030               



             0539667428)                                         

 

             GLU U QUAL (test code = Normal       Normal                    



             2259652793)                                         

 

             BLOOD (test code = 1+           Negative     A            



             5420388347)                                         

 

             KETONES (test code = 80 mg/dL     Negative     A            



             8343844571)                                         

 

             PROTEIN (test code = 30 mg/dL     Negative     A            



             2887-8)                                             

 

             UROBILIN (test code = 4.0 mg/dL    Normal       A            



             6224769224)                                         

 

             BILIRUBIN (test code = Negative     Negative                  



             8951796328)                                         

 

             NITRITE (test code = Negative     Negative                  



             9470393394)                                         

 

             LEUK SALLY (test code = 250/uL       Negative     A            



             4646603575)                                         

 

             RBC/HPF (test code =              See_Comment  H             [Autom

ated message]



             2615810309)                                         The system [employer]



                                                                 generated this



                                                                 result transmit

tisha



                                                                 reference range

: 0 -



                                                                 3 HPF. The refe

rence



                                                                 range was not u

sed



                                                                 to interpret th

is



                                                                 result as



                                                                 normal/abnormal

.

 

             WBC/HPF (test code =              See_Comment  H             [Autom

ated message]



             0057643484)                                         The system [employer]



                                                                 generated this



                                                                 result transmit

tisha



                                                                 reference range

: 0 -



                                                                 5 HPF. The refe

rence



                                                                 range was not u

sed



                                                                 to interpret th

is



                                                                 result as



                                                                 normal/abnormal

.

 

             BACTERIA (test code = Negative     Negative                  



             4951929966)                                         

 

             MUCOUS (test code = Slight       Negative LPF A            



             8495963528)                                         

 

             SQ EPITH (test code =              See_Comment                [Auto

mated message]



             1332741226)                                         The system [employer]



                                                                 generated this



                                                                 result transmit

tisha



                                                                 reference range

: <=2



                                                                 HPF. The refere

nce



                                                                 range was not u

sed



                                                                 to interpret th

is



                                                                 result as



                                                                 normal/abnormal

.

 

             YEAST BUD (test code = <1           See_Comment                [Aut

omated message]



             8623552502)                                         The system [employer]



                                                                 generated this



                                                                 result transmit

tisha



                                                                 reference range

: <=1



                                                                 HPF. The refere

nce



                                                                 range was not u

sed



                                                                 to interpret th

is



                                                                 result as



                                                                 normal/abnormal

.

 

             Lab Interpretation (test Abnormal                               



             code = 38105-3)                                        



[facility] [id number] 02:58:00





             Test Item    Value        Reference Range Interpretation Comments

 

             URIC ACID (test code = 2864166784) 4.9 mg/dL    2.9-6              

       

 

             Lab Interpretation (test code = Normal                             

    



             24883-3)                                            



[facility] Center[facility] [id number] 02:58:00





             Test Item    Value        Reference Range Interpretation Comments

 

             CREATININE (test code 0.54 mg/dL   0.5-1.04                  



             = 9774103448)                                        

 

             eGFR Calculation              mL/min/1.73m2              



             (Non-)                                        



             (test code =                                        



             3601222847)                                         

 

             eGFR Calculation              mL/min/1.73m2              



             (African American)                                        



             (test code =                                        



             4882327305)                                         

 

             ALFONZO (test code = ALFONZO) Association of                           



                          Glomerular Filtration                           



                          Rate (GFR) and Staging                           



                          of Kidney                              



                          Disease*+--------------                           



                          ---------+-------------                           



                          --------+--------------                           



                          -----------+| GFR                           



                          (mL/min/1.73 m2)?| With                           



                          Kidney Damage?|?Without                           



                          Kidney                                 



                          Damage+----------------                           



                          -------+---------------                           



                          ------+----------------                           



                          ---------+|?>90?|?Stage                           



                          one?|?                                 



                          Normal?+---------------                           



                          --------+--------------                           



                          -------+---------------                           



                          ----------+|?60-89?|?St                           



                          age two?|? Decreased                           



                          GFR?                                   



                          +----------------------                           



                          -+---------------------                           



                          +----------------------                           



                          ---+|?30-59?|?Stage                           



                          three?|? Stage three?                           



                          +----------------------                           



                          -+---------------------                           



                          +----------------------                           



                          ---+|?15-29?|?Stage                           



                          four? |? Stage                           



                          four?+-----------------                           



                          ------+----------------                           



                          -----+-----------------                           



                          --------+|?<15 (or                           



                          dialysis)?|?Stage five?                           



                          |? Stage                               



                          five?+-----------------                           



                          ------+----------------                           



                          -----+-----------------                           



                          --------+*Each stage                           



                          assumes the associated                           



                          GFR level has been in                           



                          effect for at least                           



                          three months.?Stages 1                           



                          to 5, with or without                           



                          kidney disease,                           



                          indicate chronic kidney                           



                          disease.Notes:                           



                          Determination of stages                           



                          one and two (with eGFR                           



                          >59mL/min/1.73 m2)                           



                          requires estimation of                           



                          kidney damage for at                           



                          least three months as                           



                          defined by structural                           



                          or functional                           



                          abnormalities of the                           



                          kidney, manifested by                           



                          either:Pathological                           



                          abnormalities or                           



                          Markers of kidney                           



                          damage (including                           



                          abnormalities in the                           



                          composition of the                           



                          blood or urine or                           



                          abnormalities in                           



                          imaging tests).                           



[facility] 
02:58:00





             Test Item    Value        Reference Range Interpretation Comments

 

             AST(SGOT) (test code = 8438159707) 28 U/L       13-40              

       

 

             Lab Interpretation (test code = Normal                             

    



             39043-0)                                            



[facility] CenterAlanine [facility] Transferase (SGPT)2019 
02:58:00





             Test Item    Value        Reference Range Interpretation Comments

 

             ALT(SGPT) (test code = 2388963249) 22 U/L       9-51               

       

 

             Lab Interpretation (test code = Normal                             

    



             45971-3)                                            



[facility] CenterLactate Mwybceowpwjrx2639-14-35 02:58:00





             Test Item    Value        Reference Range Interpretation Comments

 

             LDH (test code = 4489366374) 597 U/L      300-600                  

 

 

             Lab Interpretation (test code = Normal                             

    



             58543-9)                                            



[facility] CenterProtein CREAT Ratio Urine [id number] 
02:24:00





             Test Item    Value        Reference Range Interpretation Comments

 

             T. PROT U (test code = 2888-6) 31 mg/dL                            

   

 

             CREAT U (test code = 8017719979) 132.3 mg/dL                       

     

 

             Protein/Creatinine Ratio Urine              0.0-2.0                

   



             (test code = 6734528622)                                        



[facility] WITH [id number] 02:17:00





             Test Item    Value        Reference Range Interpretation Comments

 

             WBC (test code =              See_Comment                [Automated



             6690-2)                                             message] The sy

stem



                                                                 which generated



                                                                 this result



                                                                 transmitted



                                                                 reference range

:



                                                                 4.30 - 11.10



                                                                 10*3/?L. The



                                                                 reference range

 was



                                                                 not used to



                                                                 interpret this



                                                                 result as



                                                                 normal/abnormal

.

 

             RBC (test code =              See_Comment                [Automated



             789-8)                                              message] The sy

stem



                                                                 which generated



                                                                 this result



                                                                 transmitted



                                                                 reference range

:



                                                                 3.93 - 5.25



                                                                 10*6/?L. The



                                                                 reference range

 was



                                                                 not used to



                                                                 interpret this



                                                                 result as



                                                                 normal/abnormal

.

 

             HGB (test code = 9.4 g/dL     11.6-15      L            



             718-7)                                              

 

             HCT (test code = 33.1 %       35.7-45.2    L            



             4544-3)                                             

 

             MCV (test code = 81.1 fL      80.6-95.5                 



             787-2)                                              

 

             MCH (test code = 23.0 pg      25.9-32.8    L            



             785-6)                                              

 

             MCHC (test code = 28.4 g/dL    31.6-35.1    L            



             786-4)                                              

 

             RDW-SD (test code = 57.6 fL      39-49.9      H            



             31675-1)                                            

 

             RDW-CV (test code = 20.3 %       12-15.5      H            



             788-0)                                              

 

             PLT (test code =              See_Comment  L             [Automated



             777-3)                                              message] The sy

stem



                                                                 which generated



                                                                 this result



                                                                 transmitted



                                                                 reference range

:



                                                                 166 - 358 10*3/

?L.



                                                                 The reference r

luis



                                                                 was not used to



                                                                 interpret this



                                                                 result as



                                                                 normal/abnormal

.

 

             MPV (test code = 10.8 fL      9.5-12.9                  



             54967-6)                                            

 

             NRBC/100 WBC (test              See_Comment                [Automat

ed



             code = 5071106052)                                        message] 

The system



                                                                 which generated



                                                                 this result



                                                                 transmitted



                                                                 reference range

:



                                                                 0.0 - 10.0 /100



                                                                 WBCs. The refer

ence



                                                                 range was not u

sed



                                                                 to interpret th

is



                                                                 result as



                                                                 normal/abnormal

.

 

             NRBC x10^3 (test code <0.01        See_Comment                [Auto

mated



             = 0029479964)                                        message] The s

ystem



                                                                 which generated



                                                                 this result



                                                                 transmitted



                                                                 reference range

:



                                                                 10*3/?L. The



                                                                 reference range

 was



                                                                 not used to



                                                                 interpret this



                                                                 result as



                                                                 normal/abnormal

.

 

             GRAN MAT (NEUT) % 77.0 %                                 



             (test code = 770-8)                                        

 

             IMM GRAN % (test code 0.30 %                                 



             = 8633222803)                                        

 

             LYMPH % (test code = 14.7 %                                 



             736-9)                                              

 

             MONO % (test code = 7.2 %                                  



             5905-5)                                             

 

             EOS % (test code = 0.3 %                                  



             713-8)                                              

 

             BASO % (test code = 0.5 %                                  



             706-2)                                              

 

             GRAN MAT x10^3(ANC) 6.70 10*3/uL 1.88-7.09                 



             (test code =                                        



             6739675742)                                         

 

             IMM GRAN x10^3 (test 0.03 10*3/uL 0-0.06                    



             code = 1778658485)                                        

 

             LYMPH x10^3 (test code 1.28 10*3/uL 1.32-3.29    L            



             = 731-0)                                            

 

             MONO x10^3 (test code 0.63 10*3/uL 0.33-0.92                 



             = 742-7)                                            

 

             EOS x10^3 (test code = 0.03 10*3/uL 0.03-0.39                 



             711-2)                                              

 

             BASO x10^3 (test code 0.04 10*3/uL 0.01-0.07                 



             = 704-7)                                            

 

             Lab Interpretation Abnormal                               



             (test code = 33728-6)                                        



[facility] [id number] 17:46:00





             Test Item    Value        Reference Range Interpretation Comments

 

             HBsAg Semi-Quantitative (test code =                               

         



             5195-3)                                             



[facility] - ONCE [id number] 17:23:20





             Test Item    Value        Reference Range Interpretation Comments

 

             ABO & RH (test code O POSITIVE                             Performe

d at [facility]



             = 20)                                               Laboratory Serv

ices -



                                                                 [facility]





                                                                 [facility]

s



                                                                 [id number] Free:



                                                                 [phone]

A No.



                                                                 [id number]

 

             IAT (test code = Negative                               Performed a

t [facility]



             1185)                                               Laboratory Serv

ices -



                                                                 [facility]





                                                                 [facility]

s



                                                                 [id number] Free:



                                                                 [phone]

A No.



                                                                 [id number]



[facility] CenterCBC WITH [id number] 16:41:00





             Test Item    Value        Reference Range Interpretation Comments

 

             WBC (test code =              See_Comment                [Automated



             6690-2)                                             message] The sy

stem



                                                                 which generated



                                                                 this result



                                                                 transmitted



                                                                 reference range

:



                                                                 4.30 - 11.10



                                                                 10*3/?L. The



                                                                 reference range

 was



                                                                 not used to



                                                                 interpret this



                                                                 result as



                                                                 normal/abnormal

.

 

             RBC (test code =              See_Comment  L             [Automated



             789-8)                                              message] The sy

stem



                                                                 which generated



                                                                 this result



                                                                 transmitted



                                                                 reference range

:



                                                                 3.93 - 5.25



                                                                 10*6/?L. The



                                                                 reference range

 was



                                                                 not used to



                                                                 interpret this



                                                                 result as



                                                                 normal/abnormal

.

 

             HGB (test code = 8.3 g/dL     11.6-15      L            



             718-7)                                              

 

             HCT (test code = 27.8 %       35.7-45.2    L            



             4544-3)                                             

 

             MCV (test code = 78.8 fL      80.6-95.5    L            



             787-2)                                              

 

             MCH (test code = 23.5 pg      25.9-32.8    L            



             785-6)                                              

 

             MCHC (test code = 29.9 g/dL    31.6-35.1    L            



             786-4)                                              

 

             RDW-SD (test code = 55.9 fL      39-49.9      H            



             18899-7)                                            

 

             RDW-CV (test code = 19.9 %       12-15.5      H            



             788-0)                                              

 

             PLT (test code =              See_Comment  L             [Automated



             777-3)                                              message] The sy

stem



                                                                 which generated



                                                                 this result



                                                                 transmitted



                                                                 reference range

:



                                                                 166 - 358 10*3/

?L.



                                                                 The reference r

luis



                                                                 was not used to



                                                                 interpret this



                                                                 result as



                                                                 normal/abnormal

.

 

             MPV (test code = 10.4 fL      9.5-12.9                  



             16966-5)                                            

 

             NRBC/100 WBC (test              See_Comment                [Automat

ed



             code = 6511949870)                                        message] 

The system



                                                                 which generated



                                                                 this result



                                                                 transmitted



                                                                 reference range

:



                                                                 0.0 - 10.0 /100



                                                                 WBCs. The refer

ence



                                                                 range was not u

sed



                                                                 to interpret th

is



                                                                 result as



                                                                 normal/abnormal

.

 

             NRBC x10^3 (test code <0.01        See_Comment                [Auto

mated



             = [id number])                                        message] The s

ystem



                                                                 which generated



                                                                 this result



                                                                 transmitted



                                                                 reference range

:



                                                                 10*3/?L. The



                                                                 reference range

 was



                                                                 not used to



                                                                 interpret this



                                                                 result as



                                                                 normal/abnormal

.

 

             GRAN MAT (NEUT) % 65.9 %                                 



             (test code = 770-8)                                        

 

             IMM GRAN % (test code 0.50 %                                 



             = [id number])                                        

 

             LYMPH % (test code = 24.3 %                                 



             736-9)                                              

 

             MONO % (test code = 8.1 %                                  



             5905-5)                                             

 

             EOS % (test code = 0.9 %                                  



             713-8)                                              

 

             BASO % (test code = 0.3 %                                  



             706-2)                                              

 

             GRAN MAT x10^3(ANC) 3.83 10*3/uL 1.88-7.09                 



             (test code =                                        



             6437005743)                                         

 

             IMM GRAN x10^3 (test 0.03 10*3/uL 0-0.06                    



             code = 6625386528)                                        

 

             LYMPH x10^3 (test code 1.41 10*3/uL 1.32-3.29                 



             = 731-0)                                            

 

             MONO x10^3 (test code 0.47 10*3/uL 0.33-0.92                 



             = 742-7)                                            

 

             EOS x10^3 (test code = 0.05 10*3/uL 0.03-0.39                 



             711-2)                                              

 

             BASO x10^3 (test code <0.03        0.01-0.07                 



             = 704-7)                                            

 

             Lab Interpretation Abnormal                               



             (test code = 61702-5)                                        



[facility]

## 2023-09-21 NOTE — ER
Nurse's Notes                                                                                     

 [facility]                                                                 

Name: Lorena Rice                                                                                

Age: 28 yrs                                                                                       

Sex: Female                                                                                       

: 1995                                                                                   

MRN: [id number]                                                                                   

Arrival Date: 2023                                                                          

Time: 08:16                                                                                       

Account#: [id number]                                                                            

Bed IW1                                                                                           

Private MD:                                                                                       

Diagnosis: Acute pharyngitis, unspecified                                                         

                                                                                                  

Presentation:                                                                                     

                                                                                             

08:24 Chief complaint: Patient states: Swollen throat X 2 days. Coronavirus screen: At this   ld1 

      time, the client does not indicate any symptoms associated with coronavirus-19. Ebola       

      Screen: No symptoms or risks identified at this time. Initial Sepsis Screen: Does the       

      patient meet any 2 criteria? No. Patient's initial sepsis screen is negative. Does the      

      patient have a suspected source of infection? No. Patient's initial sepsis screen is        

      negative. Risk Assessment: Do you want to hurt yourself or someone else? Patient            

      reports no desire to harm self or others. Onset of symptoms was 2023 at       

      08:25.                                                                                      

08:24 Method Of Arrival: Ambulatory                                                           ld1 

08:24 Acuity: VANESSA 4                                                                           ld1 

                                                                                                  

Triage Assessment:                                                                                

08:25 General: Appears in no apparent distress. comfortable, Behavior is calm, cooperative,   ld1 

      appropriate for age. Pain: Complains of pain in uvula, left aspect of posterior pharynx     

      and right aspect of posterior pharynx Pain does not radiate. Pain currently is 8 out of     

      10 on a pain scale. Quality of pain is described as throbbing. EENT: Reports pain in        

      mouth. Neuro: Level of Consciousness is awake, alert, obeys commands, Oriented to           

      person, place, time, situation. Cardiovascular: Capillary refill < 3 seconds Patient's      

      skin is warm and dry. Respiratory: Airway is patent Respiratory effort is even,             

      unlabored. GI: Abdomen is round non-distended. : No signs and/or symptoms were            

      reported regarding the genitourinary system. Derm: No signs and/or symptoms reported        

      regarding the dermatologic system. Musculoskeletal: No deficits noted.                      

                                                                                                  

OB/GYN:                                                                                           

08:25 LMP 2023, Pregnancy unknown                                                         ld1 

                                                                                                  

Historical:                                                                                       

- Allergies:                                                                                      

08:25 No Known Allergies;                                                                     ld1 

- PMHx:                                                                                           

08:25 None;                                                                                   ld1 

- PSHx:                                                                                           

08:25  section; tubal ligation;                                                       ld1 

                                                                                                  

- Immunization history:: Adult Immunizations up to date.                                          

- Social history:: Smoking status: Patient denies any tobacco usage or history of.                

                                                                                                  

                                                                                                  

Screenin:26 [facility] ED Fall Risk Assessment (Adult) History of falling in the last 3 months,       ld1 

      including since admission No falls in past 3 months (0 pts). Abuse screen: Denies           

      threats or abuse. Denies injuries from another. Nutritional screening: No deficits          

      noted. Tuberculosis screening: No symptoms or risk factors identified.                      

                                                                                                  

Assessment:                                                                                       

08:26 Reassessment: See triage assessment.                                                    ld1 

                                                                                                  

Vital Signs:                                                                                      

08:24 Pulse 101; Resp 18; Temp 97.9(O); Pulse Ox 100% on R/A; Weight 116.12 kg; Height 5 ft.  ld1 

      8 in. ; Pain 8/10;                                                                          

08:24 Body Mass Index 38.92 (116.12 kg, 172.72 cm)                                            ld1 

08:24 Pain Scale: Adult                                                                       ld1 

                                                                                                  

ED Course:                                                                                        

08:20 Patient arrived in ED.                                                                  mg5 

08:21 [facility]                                                          snw 

08:21 Arvind Dooley MD is Attending Physician.                                            snw 

08:25 Triage completed.                                                                       ld1 

08:25 Arm band placed on right wrist.                                                         ld1 

08:26 Patient has correct armband on for positive identification. Bed in low position. Call   ld1 

      light in reach. Side rails up X2. Pulse ox on. NIBP on. Door closed. Noise minimized.       

08:26 No provider procedures requiring assistance completed. Patient did not have IV access   ld1 

      during this emergency room visit.                                                           

08:27 Niru Grigsby, RN is Primary Nurse.                                                      ld1 

                                                                                                  

Administered Medications:                                                                         

: Drug: Lortab PO Liquid 15 ml PO once Route: PO;                                         ld1 

08:41 Drug: Rocephin (cefTRIAXone) IM 1 grams IM once Route: IM; Site: right deltoid;         ld1 

08: Drug: predniSONE PO 40 mg PO once Route: PO;                                            ld1 

08:41 Drug: Famotidine PO 20 mg PO once Route: PO;                                            ld1 

                                                                                                  

                                                                                                  

Medication:                                                                                       

08: VIS not applicable for this client.                                                     ld1 

                                                                                                  

Outcome:                                                                                          

:28 Discharge ordered by MD.                                                                snw 

08:41 Discharged to home ambulatory,                                                          ld1 

08:41 Condition: stable                                                                           

08:41 Discharge instructions given to patient, Instructed on discharge instructions, follow       

      up and referral plans. medication usage, Demonstrated understanding of instructions,        

      follow-up care, medications, Prescriptions given X                                        

08:41 Patient left the ED.                                                                    ld1 

                                                                                                  

Signatures:                                                                                       

Estefani Ma, DUKE-C                   FNP-Csnw                                                  

Niru Grigsby, RN                        RN   ld1                                                  

Ale Rodriguez                             mg5                                                  

                                                                                                  

**************************************************************************************************

## 2024-06-26 NOTE — EDPHYS
Physician Documentation                                                                           

 [facility]                                                                

Name: Lorena Rice                                                                                

Age: 22 yrs                                                                                       

Sex: Female                                                                                       

: 1995                                                                                   

MRN: [id number]                                                                                   

Arrival Date: 07/10/2018                                                                          

Time: 12:04                                                                                       

Account#: [id number]                                                                            

Bed 15                                                                                            

Private MD:                                                                                       

ED Physician Silver Anthony                                                                       

HPI:                                                                                              

07/10                                                                                             

14:07 This 22 yrs old Unknown Female presents to ER via EMS with complaints of Chest Pain.    gs  

14:07 The patient or guardian reports chest pain that is located primarily in the anterior    gs  

      chest wall, left. The pain radiates to posterior aspect of left lateral abdomen.            

      Associated signs and symptoms: Pertinent positives: shortness of breath, Pertinent          

      negatives: nausea. The chest pain is described as sharp. Duration: The patient or           

      guardian reports a single episode, that is still ongoing. Severity of pain: At its          

      worst the pain was moderate in the emergency department the pain is unchanged. The          

      patient has not experienced similar symptoms in the past.                                   

                                                                                                  

OB/GYN:                                                                                           

12:08 LMP 2018                                                                           ph  

                                                                                                  

Historical:                                                                                       

- Allergies:                                                                                      

12:10 No Known Allergies;                                                                     ph  

- Home Meds:                                                                                      

12:10 oral contraceptive [Active];                                                            ph  

- PMHx:                                                                                           

12:10 None;                                                                                   ph  

- PSHx:                                                                                           

12:10 None;                                                                                   ph  

                                                                                                  

- Immunization history:: Adult Immunizations unknown.                                             

- Social history:: Smoking status: Patient/guardian denies using tobacco.                         

- Ebola Screening: : No symptoms or risks identified at this time.                                

                                                                                                  

                                                                                                  

ROS:                                                                                              

14:07 All other systems are negative.                                                         gs  

                                                                                                  

Exam:                                                                                             

14:07 Head/Face:  Normocephalic, atraumatic. Eyes:  Pupils equal round and reactive to light, gs  

      extra-ocular motions intact.  Lids and lashes normal.  Conjunctiva and sclera are           

      non-icteric and not injected.  Cornea within normal limits.  Periorbital areas with no      

      swelling, redness, or edema. ENT:  Nares patent. No nasal discharge, no septal              

      abnormalities noted.  Tympanic membranes are normal and external auditory canals are        

      clear.  Oropharynx with no redness, swelling, or masses, exudates, or evidence of           

      obstruction, uvula midline.  Mucous membranes moist. Neck:  Trachea midline, no             

      thyromegaly or masses palpated, and no cervical lymphadenopathy.  Supple, full range of     

      motion without nuchal rigidity, or vertebral point tenderness.  No Meningismus.             

      Chest/axilla:  Normal chest wall appearance and motion.  Nontender with no deformity.       

      No lesions are appreciated. Cardiovascular:  Regular rate and rhythm with a normal S1       

      and S2.  No gallops, murmurs, or rubs.  Normal PMI, no JVD.  No pulse deficits.             

      Respiratory:  Lungs have equal breath sounds bilaterally, clear to auscultation and         

      percussion.  No rales, rhonchi or wheezes noted.  No increased work of breathing, no        

      retractions or nasal flaring. Abdomen/GI:  Soft, non-tender, with normal bowel sounds.      

      No distension or tympany.  No guarding or rebound.  No evidence of tenderness               

      throughout. Skin:  Warm, dry with normal turgor.  Normal color with no rashes, no           

      lesions, and no evidence of cellulitis. MS/ Extremity:  Pulses equal, no cyanosis.          

      Neurovascular intact.  Full, normal range of motion. Neuro:  Awake and alert, GCS 15,       

      oriented to person, place, time, and situation.  Cranial nerves II-XII grossly intact.      

      Motor strength 5/5 in all extremities.  Sensory grossly intact.  Cerebellar exam            

      normal.  Normal gait.                                                                       

14:07 Constitutional: The patient appears alert, awake, uncomfortable.                            

14:07 Back: CVA tenderness, that is moderate.                                                     

                                                                                                  

Vital Signs:                                                                                      

12:08  / 85; Pulse 64; Resp 18; Temp 97.7(TE); Pulse Ox 100% on R/A; Weight 101.6 kg;   ph  

      Height 5 ft. 8 in. (172.72 cm); Pain 8/10;                                                  

13:08  / 95; Pulse 59; Resp 19; Pulse Ox 100% on R/A;                                   dh3 

13:53  / 83; Pulse 58; Resp 17; Pulse Ox 100% on R/A;                                   dh3 

14:39  / 95; Pulse 58; Resp 20; Pulse Ox 100% on R/A; Pain 3/10;                        rb1 

12:08 Body Mass Index 34.06 (101.60 kg, 172.72 cm)                                            ph  

                                                                                                  

MDM:                                                                                              

12:05 Patient medically screened.                                                               

14:07 Differential diagnosis: thoracic aortic disection, kidney stone, pyelonephritis. Data     

      reviewed: vital signs, nurses notes. Response to treatment: the patient's symptoms have     

      markedly improved after treatment, the patient's symptoms have resolved after               

      treatment, the patient's pain is gone, and as a result, I will discharge patient.           

                                                                                                  

07/10                                                                                             

12:10 Order name: Basic Metabolic Panel; Complete Time: 12:44                                   

07/10                                                                                             

12:10 Order name: CBC with Diff; Complete Time: 12:44                                           

07/10                                                                                             

12:10 Order name: LFT's; Complete Time: 12:44                                                   

07/10                                                                                             

12:10 Order name: Magnesium; Complete Time: 12:44                                               

07/10                                                                                             

12:10 Order name: PT-INR; Complete Time: 12:44                                                  

07/10                                                                                             

12:10 Order name: Troponin (emerg Dept Use Only); Complete Time: 12:44                          

07/10                                                                                             

12:10 Order name: XRAY Chest (1 view); Complete Time: 13:46                                     

07/10                                                                                             

12:10 Order name: EKG; Complete Time: 12:11                                                     

07/10                                                                                             

12:10 Order name: CT Aorta for Dissection; Complete Time: 13:46                                 

07/10                                                                                             

12:46 Order name: Urine Dipstick--Ancillary (enter results); Complete Time: 13:00               

07/10                                                                                             

12:46 Order name: Urine Pregnancy--Ancillary (enter results); Complete Time: 13:00              

07/10                                                                                             

12:10 Order name: Urine Pregnancy Test (obtain specimen); Complete Time: 12:45                  

07/10                                                                                             

12:10 Order name: Cardiac monitoring; Complete Time: 12:12                                      

07/10                                                                                             

12:10 Order name: EKG - Nurse/Tech; Complete Time: 12:46                                        

07/10                                                                                             

12:10 Order name: IV Saline Lock; Complete Time: 12:12                                          

07/10                                                                                             

12:10 Order name: Labs collected and sent; Complete Time: 12:32                                 

07/10                                                                                             

12:10 Order name: O2 Per Protocol; Complete Time: 12:12                                         

07/10                                                                                             

12:10 Order name: O2 Sat Monitoring; Complete Time: 12:12                                       

07/10                                                                                             

12:10 Order name: Urine Dipstick-Ancillary (obtain specimen); Complete Time: 12:45            gs  

                                                                                                  

Administered Medications:                                                                         

12:38 Drug: Zofran 4 mg Route: IVP; Site: right antecubital;                                  rb1 

13:00 Follow up: Response: No adverse reaction; Nausea is decreased                           rb1 

12:42 Drug: fentaNYL (PF) 25 mcg Route: IVP; Site: right antecubital;                         rb1 

13:00 Follow up: Response: No adverse reaction; Pain is decreased                             rb1 

13:30 Drug: NS 0.9% 1000 ml Route: IV; Rate: 1 bolus; Site: right antecubital;                rb1 

14:34 Follow up: IV Status: Completed infusion                                                rb1 

                                                                                                  

                                                                                                  

Disposition:                                                                                      

07/10/18 14:17 Discharged to Home. Impression: Chest pain, unspecified, Unspecified abdominal     

  pain, Anemia, unspecified.                                                                      

- Condition is Stable.                                                                            

- Discharge Instructions: Abdominal Pain, Adult, Anemia, Nonspecific, Nonspecific Chest           

  Pain.                                                                                           

                                                                                                  

- Medication Reconciliation Form, Thank You Letter, Antibiotic Education, Prescription            

  Opioid Use form.                                                                                

- Follow up: Private Physician; When: 2 - 3 days; Reason: Re-evaluation by your                   

  physician.                                                                                      

                                                                                                  

                                                                                                  

                                                                                                  

Signatures:                                                                                       

Dispatcher [employer]                           Caryn Santizo RN                      RN                                                      

Beulah Solano RN                     RN   rb1                                                  

Silver Anthony MD MD gs                                                   

                                                                                                  

Corrections: (The following items were deleted from the chart)                                    

14:45 14:17 07/10/2018 14:17 Discharged to Home. Impression: Chest pain, unspecified;         rb1 

      Unspecified abdominal pain; Anemia, unspecified. Condition is Stable. Forms are             

      Medication Reconciliation Form, Thank You Letter, Antibiotic Education, Prescription        

      Opioid Use. Follow up: Private Physician; When: 2 - 3 days; Reason: Re-evaluation by        

      your physician. gs                                                                          

                                                                                                  

************************************************************************************************** Initiate Treatment: Humira Render In Strict Bullet Format?: No Continue Regimen: Hibiclens and clindamycin topical Detail Level: Zone

## 2025-01-26 ENCOUNTER — HOSPITAL ENCOUNTER (EMERGENCY)
Dept: HOSPITAL 97 - ER | Age: 30
Discharge: HOME | End: 2025-01-26
Payer: SELF-PAY

## 2025-01-26 DIAGNOSIS — M25.572: Primary | ICD-10-CM

## 2025-01-26 PROCEDURE — 99284 EMERGENCY DEPT VISIT MOD MDM: CPT

## 2025-01-26 NOTE — EDPHYS
Physician Documentation                                                                           

 [facility]                                                                 

Name: Lorena Rice                                                                                

Age: 29 yrs                                                                                       

Sex: Female                                                                                       

: 1995                                                                                   

MRN: [id number]                                                                                   

Arrival Date: 2025                                                                          

Time: 21:23                                                                                       

Account#: [id number]                                                                            

Bed IW3                                                                                           

Private MD:                                                                                       

ED Physician Burton Grigsby                                                                         

HPI:                                                                                              

                                                                                             

21:51 This 29 yrs old Black Female presents to ER via Wheelchair with complaints of Ankle     ms3 

      Injury - left.                                                                              

21:51 Lorena Rice, a 29-year-old female, presents to the emergency department after          ms3 

      sustaining a left ankle injury. She reports twisting her left ankle inward this morning     

      at around 2 to 3 a.m. at work. The pain is described as an 8 out of 10 on the pain          

      scale, with a shooting sensation extending up her calves. She experiences worsening         

      pain with walking..                                                                         

                                                                                                  

OB/GYN:                                                                                           

21:48 LMP 1/15/2025, Pregnancy unknown                                                        me1 

                                                                                                  

Historical:                                                                                       

- Allergies:                                                                                      

21:47 No Known Allergies;                                                                     me1 

- PMHx:                                                                                           

21:47 Anemia;                                                                                 me1 

- PSHx:                                                                                           

21:47  section; tubal ligation; Cholecystectomy;                                      me1 

                                                                                                  

- Immunization history:: Adult Immunizations up to date.                                          

- Infectious Disease History:: Denies.                                                            

- Social history:: Smoking status: Reported history of juuling and/or vaping.                     

                                                                                                  

                                                                                                  

ROS:                                                                                              

21:51 Constitutional: Negative for fever, and chills. Cardiovascular: Negative for chest      ms3 

      pain, and palpitations. Respiratory: Negative for shortness of breath, cough, wheezing,     

      and pleuritic chest pain, Abdomen/GI: Negative for abdominal pain, nausea, vomiting,        

      diarrhea, and constipation, Skin: Negative for injury, rash, and discoloration,             

21:51 MS/extremity: Positive for pain, of the left ankle, ,                                       

                                                                                                  

Exam:                                                                                             

21:51 Constitutional:  This is a well developed, well nourished patient who is awake, alert,  ms3 

      and in no acute distress. Cardiovascular:  Regular rate and rhythm with a normal S1 and     

      S2.  No gallops, murmurs, or rubs.  Normal PMI, no JVD.  No pulse deficits.                 

      Respiratory:  Lungs have equal breath sounds bilaterally, clear to auscultation and         

      percussion.  No rales, rhonchi or wheezes noted.  No increased work of breathing, no        

      retractions or nasal flaring. Abdomen/GI:  Soft, non-tender, with normal bowel sounds.      

      No distension or tympany.  No guarding or rebound.  No evidence of tenderness               

      throughout. Skin:  Warm, dry with normal turgor.  Normal color with no rashes, no           

      lesions, and no evidence of cellulitis.                                                     

21:51 Musculoskeletal/extremity: Extremities: noted in the left ankle: pain, swelling,            

      tenderness, Left lateral malleolus ttp,                                                     

                                                                                                  

Vital Signs:                                                                                      

21:45  / 76; Pulse 77; Resp 18; Temp 97.6; Pulse Ox 100% ; Weight 105.23 kg; Height 5   me1 

      ft. 8 in. ; Pain 8/10;                                                                      

23:09  / 71; Pulse 72; Resp 16; Temp 98.5; Pulse Ox 100% ;                              me1 

23:09 Pain 7/10;                                                                              me1 

21:45 Body Mass Index 35.28 (105.23 kg, 172.72 cm)                                            me1 

21:45 Pain Scale: Adult                                                                       me1 

23:09 Pain Scale: Adult                                                                       me1 

                                                                                                  

MDM:                                                                                              

21:47 Medical Screening Exam initiated                                                        ms3 

22:57 Differential diagnosis: fracture, sprain. Data reviewed: vital signs, nurses notes,     ms3 

      radiologic studies, and as a result, I will discharge patient. I considered the             

      following discharge prescriptions or medication management in the emergency department      

      Medications were administered in the Emergency Department. See MAR. Independent             

      interpretation of the following test(s) in the Emergency Department X-Ray: My               

      interpretation is Left ankle x-ray images reviewed do not reveal fracutre. Counseling:      

      I had a detailed discussion with the patient and/or guardian regarding the historical       

      points, exam findings, and any diagnostic results supporting the discharge/admit            

      diagnosis, radiology results, the need for outpatient follow up, to return to the           

      emergency department if symptoms worsen or persist or if there are any questions or         

      concerns that arise at home. Special discussion: I discussed with the patient/guardian      

      in detail that at this point there is no indication for admission to the hospital. It       

      is understood, however, that if the symptoms persist or worsen the patient needs to         

      return immediately for re-evaluation. ED course: Discussed x-ray findings with patient.     

      Patient to follow-up with Dr. Ried in 2 to 3 days for reevaluation. Patient understands     

      agrees with plan. All questions were answered. Return precautions discussed include         

      worsening symptoms, or any other concerns..                                                 

                                                                                                  

                                                                                             

21:47 Order name: Ankle Left 3 View XRAY; Complete Time: 22:53                                ms3 

                                                                                             

22:53 Order name: Crutches; Complete Time: 23:09                                              ms3 

                                                                                             

22:53 Order name: Ankle Splint: Aircast; Complete Time: 23:09                                 ms3 

                                                                                                  

Administered Medications:                                                                         

21:51 Drug: Ibuprofen  mg PO once Route: PO;                                            me1 

23:09 Follow up: Pain 7/10 Adult; Response: No adverse reaction; Pain is decreased            me1 

                                                                                                  

                                                                                                  

Disposition Summary:                                                                              

25 22:56                                                                                    

Discharge Ordered                                                                                 

 Notes:       Location: Home                                                                        
  ms3

      Condition: Stable                                                                       ms3 

      Diagnosis                                                                                   

        - Pain in left ankle and joints of left foot                                          ms3 

      Followup:                                                                               ms3 

        - With: Tomás Reid MD                                                                  

        - When: 2 - 3 days                                                                         

        - Reason: Recheck today's complaints                                                       

      Discharge Instructions:                                                                     

        - Discharge Summary Sheet                                                             ms3 

        - Crutch Use, Adult, Easy-to-Read                                                     ms3 

        - Ankle Pain                                                                          ms3 

      Forms:                                                                                      

        - Medication Reconciliation Form                                                      ms3 

        - Antibiotic Education                                                                ms3 

        - Prescription Opioid Use                                                             ms3 

        - Patient Portal Instructions                                                         ms3 

        - Leadership Thank You Letter                                                         ms3 

        - Work release form                                                                   me1 

      Prescriptions:                                                                              

        - Ibuprofen 600 mg Oral Tablet                                                             

            - take 1 tablet ORAL route every 6 hours As needed take with food; 30 tablet;     ms3 

      Refills: 0, Product Selection Permitted                                                     

Signatures:                                                                                       

Dispatcher [employer]                           Burton Oviedo,                         DO   ms3                                                  

Iris Sandy, RN                  RN   me1                                                  

                                                                                                  

Corrections: (The following items were deleted from the chart)                                    

21:54 21:51 Constitutional: Negative for fever, and chills. Cardiovascular: Negative for      ms3 

      chest pain, and palpitations. Respiratory: Negative for shortness of breath, cough,         

      wheezing, and pleuritic chest pain, Abdomen/GI: Negative for abdominal pain, nausea,        

      vomiting, diarrhea, and constipation, Skin: Negative for injury, rash, and                  

      discoloration, ms3                                                                          

21:54 21:51 MS/extremity: Positive for pain, of the left ankle, Left lateral malleolus ttp,   ms3 

      ms3                                                                                         

                                                                                                  

**************************************************************************************************

## 2025-01-26 NOTE — XMS REPORT
Continuity of Care Document



                          Created on: 2025





MARIELA REDD

External Reference #: [id number]

: 1995

Sex: Female



Demographics





                                        Address             [address]

[place]  [address]

 

                                        Home Phone          [phone]

 

                                        Work Phone          [phone]

 

                                        Mobile Phone        [phone] )

 

                                        Email Address       [email or web address]

[email or web address]

 

                                        Preferred Language  English

 

                                        Marital Status      Unknown

 

                                        Sikh Affiliation Unknown

 

                                        Race                Unknown

 

                                        Additional Race(s)  Unavailable

Black or 

 

                                        Ethnic Group        Unknown





Author





                                        Name                Unknown

 

                                        Address             [address]

[place]  [address]

 

                                        [facility]

[facility]

 

                                        Address             [address]

[address]

[place]  [address]

 

                                        Phone               [phone]





Support





                          Name         Relationship Address      Phone

 

                                LUCIO HI CIRILO M               [address] 



[place]  [address]                     Unavailable

 

                                DEREK ROBLES G               [address]

[place] 



[place]  [address]                     Unavailable

 

                                Derek Arthur Sibling         [address]

[place] 



[place]  [address]                     [phone]

 

                                M Hi Aldana Mother          [address]  



[place]  [address]                     [phone]





Care Team Providers





                                Care Team Member Name Role            Phone

 

                                Pcp, Patient Does Not Have A Primary Care Physic

liliana [phone]

 

                                AMANDO NARVAEZ Attending Clinician Unavailable

 

                                Amando Narvaez DO Attending Clinician [phone]

[phone]

 

                                SLOAN NELSON Attending Clinician Unavailable

 

                                Sloan Nelson MD Attending Clinician [phone]



 

                                PORSCHE POWER Attending Clinician Unavailable

 

                                PORSCHE Rodriges Attending Clinician [phone]



 

                                Doctor Unassigned, No Name Attending Clinician Tom Zendejas MD Attending Clinician [phone]

[phone]

 

                                CYNTHIA FITCH Attending Clinician Unavailable

 

                                Cynthia Cardenas Attending Clinician [phone]

 

                                BHARTI FUENTES Attending Clinician Unavailable

 

                                Bharti Fuentes MD Attending Clinician [phone]

[phone]

 

                                Fabienne Steele RN Attending Clinician Unavailable

 

                                Lilia WALL Cristobal F Attending Clinician [phone]

[phone]

 

                                SABINA AVILEZ Attending Clinician Unavaildipti Pulido MD, Jasmin KERR Attending Clinician 

[phone]

 

                                [facility] Attending Clinician UnaPrem Chiu MD Attending Clinician [phone]

 

                                Karen EMERSON, Katy Young Attending Clinician

 [phone]

 

                                Rahul FNP, Crystal ZAMORA Attending Clinician [phone]

[phone]

 

                                Visit, [facility] Nurse Attending Clinician Unaviki Cho WHCNP, Harleen BOSWELL Attending Clinician [phone]

[phone]

 

                                Wilton EMERSON, Shanell Attending Clinician [phone]

 

                                Tommy EMERSON, Carlos Eduardo Attending Clinician [phone]

 

                                AMANDO NARVAEZ Admitting Clinician Unavailable

 

                                Tom Bustillo MD Admitting Clinician [phone]

 

                                CYNTHIA FITCH Admitting Clinician Unavailable

 

                                BHARTI FUENTES Admitting Clinician Unavailable

 

                                Prem Batista MD Admitting Clinician [phone]

 

                                Shanell Núñez MD Admitting Clinician [phone]







Payers





                    Payer Name Policy Type Policy Number Effective Date Expirati

on Date Source

 

                                                    [facility]                                  [id number]           2019 

00:00:00                                            







Problems





                                                    Condition 

Name                                    Condition 

Details                                 Condition 

Category                  Status                    Onset 

Date                                    Resolution 

Date                                    Last 

Treatment 

Date                                    Treating 

Clinician                 Comments                  Source

 

                                                    Request 

for 

sterilizat

ion                                     Request 

for 

sterilizat

ion                 Disease             Active              15 

00:00:

00                                                              Overview: 

Formattin

g of this 

note 

might be 

different 

from the 

original.

Added 

automatic

ally from 

request 

for 

surgery 

[id number]                                  [facility]

 

                                                    Postpartum 

care and 

examinatio

n 

immediatel

y after 

delivery                                Postpartum 

care and 

examinatio

n 

immediatel

y after 

delivery            Disease             Active              

814 

00:00:

00                                                               [facility]

 

                                                    38 weeks 

gestation 

of 

pregnancy                               38 weeks 

gestation 

of 

pregnancy           Disease             Active              

7-26 

00:00:

00                                                               [facility]

 

                                                    S/P 

 

section                                 S/P 

 

section             Disease             Active               

00:00:

00                                                               [facility]

 

                                                    APH 

(antepartu

m 

hemorrhage

), third 

trimester                               APH 

(antepartu

m 

hemorrhage

), third 

trimester           Disease             Active               

00:00:

00                                                               [facility]

[facility]

 

                                                    Decreased 

fetal 

movements, 

third 

trimester, 

not 

applicable 

or 

unspecifie

d                                       Decreased 

fetal 

movements, 

third 

trimester, 

not 

applicable 

or 

unspecifie

d                   Disease             Active               

00:00:

00                                                               [facility]

[facility]

 

                                                    Susceptibl

e to 

Varicella 

(non-immun

e), 

currently 

pregnant 

in third 

trimester                               Susceptibl

e to 

Varicella 

(non-immun

e), 

currently 

pregnant 

in third 

trimester           Disease             Active               

00:00:

00                                                               [facility]

[facility]

 

                                                    BMI 

39.0-39.9,

adult                                   BMI 

39.0-39.9,

adult               Disease             Active               

00:00:

00                                                               [facility]

[facility]

 

                                                    Limited 

prenatal 

care in 

third 

trimester                               Limited 

prenatal 

care in 

third 

trimester           Disease             Active               

00:00:

00                                                               [facility]

[facility]

 

                                                    Multiparit

y                                       Multiparit

y                   Disease             Active               

00:00:

00                                                               [facility]

 

                                                    Tubal 

ligation 

status                                  Tubal 

ligation 

status              Disease             Active               

00:00:

00                                                               [facility]

 

                                                    Cessation 

of tobacco 

use in 

previous 

12 months                               Cessation 

of tobacco 

use in 

previous 

12 months           Disease             Active               

00:00:

00                                                               [facility]

 

                                                    Tubal 

ligation 

status                                  Tubal 

ligation 

status              Disease             Active               

00:00:

00                                                               [facility]

 

                                                    History of 

 

delivery, 

currently 

pregnant 

in third 

trimester                               History of 

 

delivery, 

currently 

pregnant 

in third 

trimester           Disease             Active               

00:00:

00                                                               [facility]

 

                                                    Obesity 

affecting 

pregnancy 

in third 

trimester                               Obesity 

affecting 

pregnancy 

in third 

trimester           Disease             Active               

00:00:

00                                                               [facility]

 

                                                    Anemia of 

mother in 

pregnancy, 

antepartum                              Anemia of 

mother in 

pregnancy, 

antepartum          Disease             Active               

00:00:

00                                                               [facility]

 

                                                    Anemia of 

mother in 

pregnancy, 

antepartum                              Anemia of 

mother in 

pregnancy, 

antepartum          Disease             Active               

00:00:

00                                                               [facility]







Allergies, Adverse Reactions, Alerts





                                                    Allergy 

Name                                    Allergy 

Type            Status          Severity        Reaction(s)     Onset 

Date                                    Inactive 

Date                                    Treating 

Clinician                 Comments                  Source

 

                                                    NO KNOWN 

ALLERGIE

S                                       Drug 

Class   Active                                                  [facility]







Social History





                    Social Habit Start Date Stop Date Quantity  Comments  Source

 

                                        ASSERTION           2018-11-10 

00:00:00                        Pregnant                        [facility]

 

                    Sexual orientation                                         [facility]

[facility]

 

                                                    Exposure to 

SARS-CoV-2 (event)                      2022-04-15 

00:00:00                                2022 

13:31:00            Not sure                                [facility]

 

                                        Alcohol intake      2021 

00:00:00                                2021 

00:00:00                                Current 

non-drinker of 

alcohol 

(finding)                                           [facility]

 

                                                    History of Social 

function                                2020 

00:00:00                                2020 

00:00:00                                                    [facility]

 

                                                    Tobacco use and 

exposure                                2019 

00:00:00                                2019 

00:00:00                                Smokeless 

tobacco non-user                                    [facility]

 

                                        Education           2019 

00:00:00                                2019 

00:00:00            13                                      [facility]

 

                                                    History of tobacco 

use                                                 2018 

00:00:00            Cigarette Smoker                        [facility]

 

                                                    Sex Assigned At 

Birth                                   1995 

00:00:00                                1995 

00:00:00                                                    [facility]







                          Smoking Status Start Date   Stop Date    Source

 

                          Ex-smoker    2019 00:00:00 2019 00:00:00 [facility]

[facility]

 

                          Unknown if ever smoked                           [facility]







Medications





                                                    Ordered 

Medication 

Name                                    Filled 

Medication 

Name                                    Start 

Date                                    Stop 

Date                                    Current 

Medication?                             Ordering 

Clinician       Indication      Dosage          Frequency       Signature 

(SIG)               Comments            Components          Source

 

                                                    HYDROcodone

-acetaminop

hen (NORCO) 

 mg 

tablet 1 

tablet                                               

00:00:

00                                       

22:51

:00        No                               1{tbl}                1 tablet, 

Oral, 

ONCE, 1 

dose, On 

22 at 

1900, 

Routine                                                     [facility]

 

                                                    ketorolac 

10 mg 

tablet                                               

00:00:

00                              Yes                             [id number]

[id number]                10mg                                    Take 1 

tablet by 

mouth 

every 6 

(six) 

hours as 

needed for 

Pain 

(scale 

7-10).                                                      [facility]

 

                                                    metroNIDAZO

 mg 

tablet                                               

00:00:

00                  Yes                 [id number] 500mg               Take 1 

tablet by 

mouth 2 

(two) 

times 

daily.                                                      [facility]

 

                                                    ibuprofen 

800 mg 

tablet                                               

00:00:

00                  Yes                 [id number] 800mg               Take 1 

tablet by 

mouth 

every 8 

(eight) 

hours as 

needed for 

Pain 

(scale 

4-6).                                                       [facility]

 

                                                    methocarbam

oL 

(ROBAXIN) 

500 mg 

tablet                                               

00:00:

00                  Yes                 [id number] 500mg               Take 1 

tablet by 

mouth 

every 6 

(six) 

hours as 

needed for 

Pain 

(scale 

7-10) 

(MUSCLE 

SPASM).                                                     [facility]

 

                                                    acetaminoph

en 

(TYLENOL) 

tablet 

1,000 mg                                            2021 

05:30:

00                                       

04:31

:00        No                               1000mg                1,000 mg, 

Oral, 

ONCE, 1 

dose, On 

Sat 

21 

at 2330, 

ASAP                                                        [facility]

 

                                                    ibuprofen 

(IBU) 

tablet 800 

mg                                                  2021 

04:30:

00                                       

03:32

:00        No                               800mg                 800 mg, 

Oral, 

ONCE, 1 

dose, On 

Sat 

21 

at 2230, 

ASAP                                                        [facility]

 

                                                    benzonatate 

100 mg 

capsule                                             2021 

00:00:

00                  Yes                 [id number] 100mg               Take 1 

capsule by 

mouth 3 

(three) 

times 

daily as 

needed for 

Cough.                                                      [facility]

 

                                                    ondansetron 

(ZOFRAN 

ODT) 4 mg 

disintegrat

ing tablet                                          2021 

00:00:

00                  Yes                 [id number] 4mg                 Take 1 

tablet by 

mouth 

every 8 

(eight) 

hours as 

needed for 

Nausea and 

Vomiting 

(N/V).                                                      [facility]

 

                                                    ibuprofen 

600 mg 

tablet                                              2021 

00:00:

00                                       

00:00

:00        No                    [id number]  600mg                 Take 1 

tablet by 

mouth 

every 6 

(six) 

hours as 

needed for 

Pain 

(scale 

4-6).                                                       [facility]

 

                                                    fluconazole 

150 mg 

tablet                                              2021 

00:00:

00                                       

05:59

:00        No                    [id number]    150mg                 Take 1 

tablet by 

mouth once 

now for 1 

dose.                                                       [facility]

 

                                                    ibuprofen 

(IBU) 

tablet 400 

mg                                                  2021 

09:15:

00                                       

08:38

:00        No                               400mg                 400 mg, 

Oral, 

ONCE, 1 

dose, On 

Sat 

21 

at 0315, 

Routine                                                     [facility]

 

                                                    lidocaine-e

pinephrine 

(XYLOCAINE 

WITH 

EPINEPHRINE

) 1 

%-1:100,000 

injection 

10 mL                                               2021 

08:15:

00                                       

08:38

:00        No                               10mL                  10 mL, 

Intraderma

l, ONCE, 1 

dose, On 

Sat 

21 

at 0215, 

Routine                                                     [facility]

 

                                                    maalox:diph

enhydrAMINE

:lidocaine 

2 % viscous 

1:1:1 

(FIRST-MOUT

HWASH BLM) 

oral 

suspension 

15 mL                                               2021 

04:30:

00                                       

03:40

:00        No                               15mL                  15 mL, 

Oral, 

ONCE, 1 

dose, On 

21 

at 2230, 

Routine                                                     [facility]

 

                                                    dexamethaso

ne 

(DECADRON 

PHOSPHATE) 

injection 

10 mg                                               2021 

04:30:

00                                       

03:39

:00        No                               10mg                  10 mg, IV 

Push, 

ONCE, 1 

dose, On 

21 

at 2230, 

STAT                                                        [facility]

 

                                                    iopamidol 

(ISOVUE 

370-500 mL) 

injection 

100 mL                                              2021 

03:50:

00                                       

03:51

:00        No                    [id number]  100mL                 100 mL, 

Intravenou

s, ONCE, 1 

dose, On 

21 

at 2200, 

Routine                                                     [facility]

 

                                                    methylPREDN

ISolone 4 

mg tablets                                          2021 

00:00:

00                  Yes                 [id number]                      Take by 

mouth 

SEE-INSTRU

CTIONS. 

follow 

package 

directions                                                  [facility]

 

                                                    amoxicillin

-clavulanat

e 

(AUGMENTIN) 

875-125 mg 

per tablet                                          2021 

00:00:

00                                       

05:59

:00        No                    [id number]   1{tbl}                Take 1 

tablet by 

mouth 2 

(two) 

times 

daily for 

10 days.                                                    [facility]

 

                                                    lidocaine 

2% viscous 

(LIDOCAINE 

VISCOUS) 2 

% solution 

15 mL                                               2021 

18:45:

00                                       

17:46

:00        No                               15mL                  15 mL, 

Oral, 

ONCE, 1 

dose, On 

21 

at 1245, 

ASAP                                                        [facility]

 

                                                    dexamethaso

ne 

(DECADRON 

PHOSPHATE) 

injection 

10 mg                                               2021 

18:45:

00                                       

17:46

:00        No                               10mg                  10 mg, 

Oral, 

ONCE, 1 

dose, On 

21 

at 1245, 

Routine                                                     [facility]

 

                                                    penicillin 

g 

benzathine 

(BICILLIN 

L-A) 

injection 

1.2 Million 

Units                                               2021 

18:45:

00                                       

17:45

:00        No                               1.210                 1.2 

[place] 

Units, 

Intramuscu

lar, ONCE, 

1 dose, On 

21 

at 1245, 

ASAP<br>Re

ason for 

Anti-Infec

tive: 

Empiric 

Therapy 

for 

Suspected 

Infection<

br>Empiric 

Therapy 

Site: 

HEENT<br>D

uration of 

therapy: 

72 hours                                                    [facility]

 

                                                    iopamidol 

(ISOVUE 

370-500 mL) 

injection 

100 mL                                              2021 

19:00:

00                                      2021-

10-29 

17:40

:00        No                    [id number]   100mL                 100 mL, 

Intravenou

s, ONCE, 1 

dose, On 

Fri 

10/29/21 

at 1400, 

Routine                                                     [facility]

 

                                                    FENTanyl PF 

(SUBLIMAZE 

(PF)) 

injection 

100 mcg                                             2021 

18:30:

00                                      2021-

10-29 

17:52

:00        No                               100ug                 100 mcg, 

Slow IV 

Push, 

ONCE, 1 

dose, On 

Fri 

10/29/21 

at 1330, 

STAT                                                        [facility]

 

                                                    dicyclomine 

20 mg 

tablet                                              2021 

00:00:

00                  Yes                 [id number] 20mg                Take 1 

tablet by 

mouth 4 

(four) 

times 

daily.                                                      [facility]

 

                                                    ibuprofen 

600 mg 

tablet                                              2021 

00:00:

00                                       

00:00

:00        No                    [id number]  600mg                 Take 1 

tablet by 

mouth 

every 8 

(eight) 

hours as 

needed for 

Pain 

(scale 

4-6).                                                       [facility]

 

                                                    ibuprofen 

(IBU) 

tablet 800 

mg                                                  2020 

00:15:

00                                       

23:30

:00        No                               800mg                 800 mg, 

Oral, 

ONCE, 1 

dose, Sat 

20 

at 1815, 

ASAP                                                        [facility]

 

                                                    lidocaine 

2% viscous 

(LIDOCAINE 

VISCOUS) 2 

% solution 

10 mL                                               2020 

00:15:

00                                       

23:30

:00        No                               10mL                  10 mL, 

Oral, 

ONCE, 1 

dose, Sat 

20 

at 1815, 

[facility]

 

                                                    dexamethaso

ne 

(DECADRON 

PHOSPHATE) 

injection 

10 mg                                               2020 

00:15:

00                                       

23:30

:00        No                               10mg                  10 mg, 

Intramuscu

lar, ONCE, 

1 dose, 

Sat 

20 

at 1815, 

STAT                                                        [facility]

 

                                                    No known 

medications                                         2020 

18:14:

13            No                                                      [facility]

 

                                                    cephALEXin 

500 mg 

tablet                                              2020 

00:00:

00                                       

05:59

:00        No                    [id number]   500mg                 Take 1 

tablet by 

mouth 2 

(two) 

times 

daily for 

10 days.                                                    [facility]

 

                                                    lactated 

ringers IV 

infusion 

1,000 mL                                             

14:15:

00                  Yes                           1000mL              at 75 

mL/hr, 

1,000 mL, 

IV 

Infusion, 

CONTINUOUS

, Starting 

u 19 

at 0915, 

Until 

Discontinu

ed, 

Routine, 

PACU                                                        [facility]

 

                                                    HYDROcodone

-acetaminop

hen (NORCO 

5) 5-325 mg 

tablet 1 

tablet                                               

14:13:

31                  Yes                           1{tbl}              1 tablet, 

Oral, PRN, 

1 dose, 

Starting 

u 19 

at 0913, 

Until 

Discontinu

ed, 

Routine, 

Pain 

(scale 

7-10), DSU 

Recovery                                                    [facility]

 

                                                    ibuprofen 

(IBU) 

tablet 800 

mg                                                   

14:13:

31                  Yes                           800mg               800 mg, 

Oral, PRN, 

1 dose, 

Starting 

u 19 

at 0913, 

Until 

Discontinu

ed, 

Routine, 

Pain 

(scale 

1-3), Pain 

(scale 

4-6), DSU 

Recovery                                                    [facility]

 

                                                    HYDROmorpho

ne 

(DILAUDID) 

injection 

0.2 mg                                               

14:13:

20                  Yes                           .2mg                0.2 mg, 

Slow IV 

Push, 

Q5MIN PRN, 

10 doses, 

Starting 

Thu 19 

at 0913, 

Until 

Discontinu

ed, 

Routine, 

Pain 

(scale 

7-10), 

PACU<br>Us

e approved 

by 

(Faculty): 

[employer], 

[employer]IN 

[employer]ICE                                                     [facility]

 

                                                    FENTanyl PF 

(SUBLIMAZE 

(PF)) 

injection 

25 mcg                                               

14:13:

20                  Yes                           25ug                25 mcg, 

Slow IV 

Push, 

Q5MIN PRN, 

4 doses, 

Starting 

Thu 19 

at 0913, 

Until 

Discontinu

ed, 

Routine, 

Pain 

(scale 

4-6), PACU                                                  [facility]

 

                                                    ondansetron 

(ZOFRAN 

(PF)) 

injection 4 

mg                                                   

14:13:

20                  Yes                           4mg                 4 mg, Slow

 

IV Push, 

PRN, 1 

dose, 

Starting 

Thu 19 

at 0913, 

Until 

Discontinu

ed, 

Routine, 

Nausea and 

Vomiting 

(N/V), 

PACU                                                        [facility]

 

                                                    bupivacaine 

(preserv 

free) 

(SENSORCAIN

E MPF) 0.25 

% (2.5 

mg/mL) 

injection                                            

13:56:

00                  Yes                                               PRN, 

Starting 

u 19 

at 0856, 

Until 

Discontinu

ed, 

Routine, 

Intra-op                                                    [facility]

 

                                                    ibuprofen 

800 mg 

tablet                                               

00:00:

00                  Yes                 [id number] 800mg               Take 1 

tablet by 

mouth 

every 6 

(six) 

hours as 

needed for 

Pain 

(scale 

4-6).                                                       [facility]

 

                                                    HYDROcodone

-acetaminop

hen 5-325 

mg tablet                                            

00:00:

00                              Yes                             [id number]

108                 1{tbl}                                  Take 1 

tablet by 

mouth 

every 6 

(six) 

hours as 

needed for 

Pain 

(scale 

7-10).                                                      [facility]

 

                                                    ibuprofen 

800 mg 

tablet                                              2019-0

8-15 

00:00:

00                                       

00:00

:00                 No                                      [id number]

108                 800mg                                   Take 1 

tablet by 

mouth 

every 6 

(six) 

hours as 

needed for 

Pain 

(scale 

4-6).                                                       [facility]

 

                                                    HYDROcodone

-acetaminop

hen 5-325 

mg tablet                                           2019-0

8-15 

00:00:

00                                       

00:00

:00                 No                                      [id number]

108                 1{tbl}                                  Take 1 

tablet by 

mouth 

every 6 

(six) 

hours as 

needed for 

Pain 

(scale 

7-10).                                                      [facility]

 

                                                    ascorbic 

acid, 

vitamin C, 

(VITAMIN C) 

500 mg 

tablet                                               

00:00:

00                                       

00:00

:00        No                    [id number]  500mg                 Take 1 

tablet by 

mouth 3 

(three) 

times 

daily.                                                      [facility]

 

                                                    docusate 

calcium 240 

mg capsule                                           

00:00:

00                                      2019-

08-15 

00:00

:00        No                    [id number]  240mg                 Take 1 

capsule by 

mouth once 

daily as 

needed for 

Constipati

on. May 

substitute 

for what 

is in 

stock and 

covered by 

patient 

plan                                                        [facility]

 

                                                    ferrous 

sulfate 325 

mg (65 mg 

iron) 

tablet                                               

00:00:

00                                      2019-

08-15 

00:00

:00        No                    [id number]  325mg                 Take 1 

tablet by 

mouth 3 

(three) 

times 

daily with 

meals. May 

substitute 

for what 

is in 

stock and 

covered by 

patient 

plan                                                        [facility]

 

                                                    ibuprofen 

600 mg 

tablet                                               

00:00:

00                                      2019-

08-15 

00:00

:00        No                    [id number]  600mg                 Take 1 

tablet by 

mouth 

every 6 

(six) 

hours as 

needed for 

Pain 

(scale 

1-3) or 

Pain 

(scale 

4-6) 

(Pain). 

Take with 

food or 

milk.                                                       [facility]

 

                                                    foLIC acid 

1 mg tablet                                          

00:00:

00                                      2019-

08-15 

00:00

:00        No                    [id number]  1mg                   Take 1 

tablet by 

mouth 

daily.                                                      [facility]

 

                                                    HYDROcodone

-acetaminop

hen 5-325 

mg tablet                                            

00:00:

00                                      2019-

08-15 

00:00

:00        No                    [id number]  1{tbl}                Take 1 

tablet by 

mouth 

every 6 

(six) 

hours as 

needed for 

Pain 

(scale 

7-10).                                                      [facility]

 

                                                    human 

papillomav 

vac,9-carmelo(P

F) 

(GARDASIL 9 

(PF)) vial 

0.5 mL                                               

11:42:

18                                       

18:03

:00        No                               .5mL                  0.5 mL, 

Intramuscu

lar, 

ONCE-PRIOR 

TO 

DISCHARGE, 

1 dose, 

Starting 

Thu 

19 at 

0642, 

Until 

Discontinu

ed, 

Routine, 

Give 

vaccine 

prior to 

discharge                                                   [facility]

 

                                                    varicella 

virus 

vaccine 

live 

(VARIVAX 

(PF)) 

injection 

0.5 mL                                               

11:42:

08                                       

19:25

:00        No                               .5mL                  0.5 mL, 

Subcutaneo

us, 

ONCE-PRIOR 

TO 

DISCHARGE, 

1 dose, 

Starting 

Thu 

19 at 

0642, 

Until 

Discontinu

ed, 

Routine, 

Give 

vaccine 

prior to 

discharge                                                   [facility]

 

                                                    HYDROcodone

-acetaminop

hen (NORCO 

5) 5-325 mg 

tablet 2 

tablet                                               

05:14:

00                  Yes                           2{tbl}              2 tablet, 

Oral, 

Q6HPRN, 

Starting 

Thu 

19 at 

0014, 

Until 

Discontinu

ed, 

Routine, 

Pain 

(scale 

7-10), If 

uncontroll

ed by 

Ibuprofen                                                   [facility]

 

                                                    HYDROcodone

-acetaminop

hen (NORCO 

5) 5-325 mg 

tablet 1 

tablet                                               

05:14:

00                  Yes                           1{tbl}              1 tablet, 

Oral, 

Q6HPRN, 

Starting 

Thu 

19 at 

0014, 

Until 

Discontinu

ed, 

Routine, 

Pain 

(scale 

4-6), If 

uncontroll

ed by 

Ibuprofen                                                   [facility]

 

                                                    ibuprofen 

(IBU) 

tablet 600 

mg                                                   

05:14:

00                  Yes                           600mg               600 mg, 

Oral, 

Q6HPRN, 

Starting 

Thu 

19 at 

0014, 

Until 

Discontinu

ed, 

Routine, 

Pain 

(scale 

1-3)                                                        [facility]

 

                                                    diphenhydrA

MINE 

(BENADRYL) 

tablet 25 

mg                                                   

05:14:

00                  Yes                           25mg                25 mg, 

Oral, 

Q6HPRN, 

Starting 

Thu 

19 at 

0014, 

Until 

Discontinu

ed, 

Routine, 

Sleep, 

Itching                                                     [facility]

 

                                                    ondansetron 

(ZOFRAN 

(PF)) 

injection 4 

mg                                                   

05:14:

00                  Yes                           4mg                 4 mg, Slow

 

IV Push, 

Q8HPRN, 

Starting 

u 

19 at 

0014, 

Until 

Discontinu

ed, 

Routine, 

Nausea and 

Vomiting 

(N/V)                                                       [facility]

 

                                                    bisacodyl 

(DULCOLAX) 

suppository 

10 mg                                                

05:14:

00                  Yes                           10mg                10 mg, 

Rectal, 

QDAILYPRN, 

Starting 

u 

19 at 

0014, 

Until 

Discontinu

ed, 

Routine, 

Constipati

on                                                          [facility]

 

                                                    simethicone 

(GAS 

RELIEF) 

chewable 

tablet 160 

mg                                                   

05:14:

00                  Yes                           160mg               160 mg, 

Oral, 

PC+HSPRN, 

Starting 

u 

19 at 

0014, 

Until 

Discontinu

ed, 

Routine, 

Gas                                                         [facility]

 

                                                    docusate 

calcium 

(SURFAK) 

capsule 240 

mg                                                   

05:14:

00                  Yes                           240mg               240 mg, 

Oral, 

QDAILYPRN, 

Starting 

u 

19 at 

0014, 

Until 

Discontinu

ed, 

Routine, 

Constipati

on                                                          [facility]

 

                                                    magnesium 

hydroxide 

(MILK OF 

MAGNESIA) 

400 mg/5 mL 

suspension 

30 mL                                                

05:14:

00                  Yes                           30mL                30 mL, 

Oral, 

QDAILYPRN, 

Starting 

u 

19 at 

0014, 

Until 

Discontinu

ed, 

Routine, 

Constipati

on                                                          [facility]

 

                                                    naloxone 

(NARCAN) 

injection 

0.4 mg                                               

05:13:

52                                       

05:12

:52        No                               .4mg                  0.4 mg, 

Slow IV 

Push, PRN 

- SEE 

INSTRUCTIO

NS, 

Starting 

u 

19 at 

0013, 

Until Sat 

19 at 

0012, 

Routine, 

Analgesia 

Recovery, 

PACU                                                        [facility]

 

                                                    diphenhydrA

MINE 

(BENADRYL) 

injection 

25 mg                                                

05:13:

52                                       

05:12

:52        No                               25mg                  25 mg, 

Slow IV 

Push, 

Q4HPRN, 

Starting 

u 

19 at 

0013, 

Until 19 at 

0012, 

Routine, 

Itching, 

PACU                                                        [facility]

 

                                                    prenatal 

vit 

calc,iron,f

olic 

(PRENATAL 

VITAMIN 

ORAL)                                                

04:10:

56                                       

00:00

:00        No                                                     Take by 

mouth.                                                      [facility]

 

                                                    ferrous 

sulfate 325 

mg (65 mg 

iron) 

tablet                                               

04:10:

56                                       

00:00

:00        No                               325mg                 Take 325 

mg by 

mouth 3 

(three) 

times 

daily with 

meals.                                                      [facility]

 

                                                    azithromyci

n 

(ZITHROMAX) 

500 mg in 

NaCl 0.9% 

(NS) 250 mL 

VIAL-MATE 

IV 

piggyback                                            

01:59:

39                                       

05:07

:00        No                               500mg                 500 mg, IV 

Piggyback, 

O.R. 

HOLDING 

ONCE, 1 

dose, 

Starting 

19 at 

, 

Until 

Discontinu

ed, 250 

mL<br>Reas

on for 

Anti-Infec

tive: 

Surgical 

Prophylaxi

s<br>Surgi

eliza 

Prophylaxi

s: 

OB/GYN<br>

Duration 

of 

therapy: 

within 24 

hours of 

surgery                                                     [facility]

 

                                                    ceFAZolin 

in dextrose 

(iso-os) 

(ANCEF) 2 

gram/100 mL 

Piggyback 2 

g                                                    

01:58:

45                                       

02:08

:00        No                               2000mg                2 g (2,000 

mg), IV 

Piggyback, 

O.R. 

HOLDING 

ONCE, 1 

dose, 

Starting 

19 at 

, 

Until 19 at 

, 100 

mL<br>Reas

on for 

Anti-Infec

tive: 

Surgical 

Prophylaxi

s<br>Surgi

eliza 

Prophylaxi

s: 

OB/GYN<br>

Duration 

of 

therapy: 

within 24 

hours of 

surgery                                                     [facility]

 

                                                    sodium 

citrate-cit

brayan acid 

(BICITRA) 

500-334 

mg/5 mL 

solution 30 

mL                                                   

01:58:

44                                       

02:09

:00        No                               30mL                  30 mL, 

Oral, 

PRE-PROCED

URE ONCE, 

1 dose, 

Starting 

19 at 

, 

Until 19 at 

2109, 

Routine, 

Surgery                                                     [facility]

 

                                                    LR 1000 mL 

+ oxytocin 

20 units IV 

Solution                                             

15:45:

16                                       

05:14

:04        No                               2mU/min               2 

sally-unit

s/min (6 

mL/hr), at 

6 mL/hr, 

IV 

Infusion, 

TITRATE, 

Starting 

19 at 

1045, 

Until Thu 

19 at 

0014, 

ASAP, 

Oxytocin 

induction.                                                  [facility]

 

                                                    sodium 

citrate-cit

brayan acid 

(BICITRA) 

500-334 

mg/5 mL 

solution 30 

mL                                                   

15:44:

10                                       

00:09

:00        No                               30mL                  30 mL, 

Oral, 

PRE-PROCED

URE ONCE, 

1 dose, 

Starting 

19 at 

1044, 

Until 

Discontinu

ed, 

Routine, 

Surgery/Pr

ocedure                                                     [facility]

 

                                                    lactated 

ringers IV 

infusion 

500 mL                                               

15:44:

10                                       

05:14

:04        No                               500mL                 at 999 

mL/hr, 500 

mL, IV 

Infusion, 

PRN - SEE 

INSTRUCTIO

NS, 

Starting 

19 at 

1044, 

Until Thu 

19 at 

0014, 

Routine                                                     [facility]

 

                                                    No known 

medications                   No                                              Un

kamlesh

[facility]

 

                                                    No known 

medications                   No                                              Un

kamlesh

[facility]







Immunizations





                                                    Ordered 

Immunization Name                       Filled 

Immunization Name Date            Status          Comments        Source

 

                                                    Varicella 

(varivax)(chicken 

pox)                                                2019 

00:00:00            Completed                               [facility]                            2019 

00:00:00            Completed                               [facility]

 

                                                    Varicella 

(varivax)(chicken 

pox)                                                2019 

00:00:00            Completed                               [facility]                            2019 

00:00:00            Completed                               [facility]

 

                                                    Varicella 

(varivax)(chicken 

pox)                                                2019 

00:00:00            Completed                               [facility]                            2019 

00:00:00            Completed                               [facility]

 

                                                    Varicella 

(varivax)(chicken 

pox)                                                2019 

00:00:00            Completed                               [facility]                            2019 

00:00:00            Completed                               [facility]

 

                                                    Varicella 

(varivax)(chicken 

pox)                                                2019 

00:00:00            Completed                               [facility]

 

                                HPV9                            2019 

00:00:00            Completed                               [facility]

 

                                                    Varicella 

(varivax)(chicken 

pox)                                                2019 

00:00:00            Completed                               [facility]

 

                                HPV9                            2019 

00:00:00            Completed                               [facility]

 

                                                    Varicella 

(varivax)(chicken 

pox)                                                2019 

00:00:00            Completed                               [facility]

 

                                HPV9                            2019 

00:00:00            Completed                               [facility]

 

                                                    Varicella 

(varivax)(chicken 

pox)                                                2019 

00:00:00            Completed                               [facility]

 

                                HPV9                            2019 

00:00:00            Completed                               [facility]                            2019 

00:00:00            Completed                               [facility]

 

                                                    Varicella 

(varivax)(chicken 

pox)                                                2019 

00:00:00            Completed                               [facility]

 

                                HPV9                            2019 

00:00:00            Completed                               [facility]

 

                                                    Varicella 

(varivax)(chicken 

pox)                                                2019 

00:00:00            Completed                               [facility]

 

                                HPV9                            2019 

00:00:00            Completed                               [facility]

 

                                                    Varicella 

(varivax)(chicken 

pox)                                                2019 

00:00:00            Completed                               [facility]

 

                                HPV9                            2019 

00:00:00            Completed                               [facility]

 

                                                    Varicella 

(varivax)(chicken 

pox)                                                2019 

00:00:00            Completed                               [facility]

 

                                HPV9                            2019 

00:00:00            Completed                               [facility]

 

                                                    Varicella 

(varivax)(chicken 

pox)                                                2019 

00:00:00            Completed                               [facility]

 

                                HPV9                            2019 

00:00:00            Completed                               [facility]

 

                                                    Varicella 

(varivax)(chicken 

pox)                                                2019 

00:00:00            Completed                               [facility]

 

                                HPV9                            2019 

00:00:00            Completed                               [facility]

 

                                                    Varicella 

(varivax)(chicken 

pox)                                                2019 

00:00:00            Completed                               [facility]

 

                                HPV9                            2019 

00:00:00            Completed                               [facility]

 

                                                    Varicella 

(varivax)(chicken 

pox)                                                2019 

00:00:00            Completed                               [facility]

 

                                HPV9                            2019 

00:00:00            Completed                               [facility]

 

                                                    Varicella 

(varivax)(chicken 

pox)                                                2019 

00:00:00            Completed                               [facility]

 

                                HPV9                            2019 

00:00:00            Completed                               [facility]

 

                                                    Varicella 

(varivax)(chicken 

pox)                                                2019 

00:00:00            Completed                               [facility]

 

                                HPV9                            2019 

00:00:00            Completed                               [facility]

 

                                                    Varicella 

(varivax)(chicken 

pox)                                                2019 

00:00:00            Completed                               [facility]

 

                                HPV9                            2019 

00:00:00            Completed                               [facility]

 

                                                    Varicella 

(varivax)(chicken 

pox)                                                2019 

00:00:00            Completed                               [facility]

 

                                HPV9                            2019 

00:00:00            Completed                               [facility]

 

                                                    Varicella 

(varivax)(chicken 

pox)                                                2019 

00:00:00            Completed                               [facility]

 

                                HPV9                            2019 

00:00:00            Completed                               [facility]

 

                                                    Varicella 

(varivax)(chicken 

pox)                                                2019 

00:00:00            Completed                               [facility]

 

                                HPV9                            2019 

00:00:00            Completed                               [facility]

 

                                                    Varicella 

(varivax)(chicken 

pox)                                                2019 

00:00:00            Completed                               [facility]

 

                                HPV9                            2019 

00:00:00            Completed                               [facility]

 

                                                    Varicella 

(varivax)(chicken 

pox)                                                2019 

00:00:00            Completed                               [facility]

 

                                HPV9                            2019 

00:00:00            Completed                               [facility]

 

                                                    TDAP (ADACEL) 

VACCINE                                             2019 

00:00:00            Completed                               [facility]

 

                                                    TDAP (ADACEL) 

VACCINE                                             2019 

00:00:00            Completed                               [facility]

 

                                                    TDAP (ADACEL) 

VACCINE                                             2019 

00:00:00            Completed                               [facility]

 

                                                    TDAP (ADACEL) 

VACCINE                                             2019 

00:00:00            Completed                               [facility]

 

                                                    TDAP (ADACEL) 

VACCINE                                             2019 

00:00:00            Completed                               [facility]

 

                                                    TDAP (ADACEL) 

VACCINE                                             2019 

00:00:00            Completed                               [facility]

 

                                                    TDAP (ADACEL) 

VACCINE                                             2019 

00:00:00            Completed                               [facility]

 

                                                    TDAP (ADACEL) 

VACCINE                                             2019 

00:00:00            Completed                               [facility]

 

                                                    TDAP (ADACEL) 

VACCINE                                             2019 

00:00:00            Completed                               [facility]

 

                                                    TDAP (ADACEL) 

VACCINE                                             2019 

00:00:00            Completed                               [facility]

 

                                                    TDAP (ADACEL) 

VACCINE                                             2019 

00:00:00            Completed                               [facility]

 

                                                    TDAP (ADACEL) 

VACCINE                                             2019 

00:00:00            Completed                               [facility]

 

                                                    TDAP (ADACEL) 

VACCINE                                             2019 

00:00:00            Completed                               [facility]

 

                                                    TDAP (ADACEL) 

VACCINE                                             2019 

00:00:00            Completed                               [facility]

 

                                                    TDAP (ADACEL) 

VACCINE                                             2019 

00:00:00            Completed                               [facility]

 

                                                    TDAP (ADACEL) 

VACCINE                                             2019 

00:00:00            Completed                               [facility]

 

                                                    TDAP (ADACEL) 

VACCINE                                             2019 

00:00:00            Completed                               [facility]

 

                                                    TDAP (ADACEL) 

VACCINE                                             2019 

00:00:00            Completed                               [facility]

 

                                                    TDAP (ADACEL) 

VACCINE                                             2019 

00:00:00            Completed                               [facility]

 

                                                    TDAP (ADACEL) 

VACCINE                                             2019 

00:00:00            Completed                               [facility]

 

                                                    TDAP (ADACEL) 

VACCINE                                             2019 

00:00:00            Completed                               [facility]

 

                                                    TDAP (ADACEL) 

VACCINE                                             2019 

00:00:00            Completed                               [facility]

 

                                                    TDAP (ADACEL) 

VACCINE                                             2019 

00:00:00            Completed                               [facility]

 

                                                    TDAP (ADACEL) 

VACCINE                                             2019 

00:00:00            Completed                               [facility]

 

                                                    TDAP (ADACEL) 

VACCINE                                             2019 

00:00:00            Completed                               [facility]

 

                                                    Varicella 

(varivax)(chicken 

pox)                                                2016 

00:00:00            Completed                               [facility]

 

                                                    Varicella 

(varivax)(chicken 

pox)                                                2016 

00:00:00            Completed                               [facility]

 

                                                    Varicella 

(varivax)(chicken 

pox)                                                2016 

00:00:00            Completed                               [facility]

 

                                                    Varicella 

(varivax)(chicken 

pox)                                                2016 

00:00:00            Completed                               [facility]

 

                                                    Varicella 

(varivax)(chicken 

pox)                                                2016 

00:00:00            Completed                               [facility]

 

                                                    Varicella 

(varivax)(chicken 

pox)                                                2016 

00:00:00            Completed                               [facility]

 

                                                    Varicella 

(varivax)(chicken 

pox)                                                2016 

00:00:00            Completed                               [facility]

 

                                                    Varicella 

(varivax)(chicken 

pox)                                                2016 

00:00:00            Completed                               [facility]

 

                                                    Varicella 

(varivax)(chicken 

pox)                                                2016 

00:00:00            Completed                               [facility]

 

                                                    Varicella 

(varivax)(chicken 

pox)                                                2016 

00:00:00            Completed                               [facility]

 

                                                    Varicella 

(varivax)(chicken 

pox)                                                2016 

00:00:00            Completed                               [facility]

 

                                                    Varicella 

(varivax)(chicken 

pox)                                                2016 

00:00:00            Completed                               [facility]

 

                                                    Varicella 

(varivax)(chicken 

pox)                                                2016 

00:00:00            Completed                               [facility]

 

                                                    Varicella 

(varivax)(chicken 

pox)                                                2016 

00:00:00            Completed                               [facility]

 

                                                    Varicella 

(varivax)(chicken 

pox)                                                2016 

00:00:00            Completed                               [facility]

 

                                                    Varicella 

(varivax)(chicken 

pox)                                                2016 

00:00:00            Completed                               [facility]

 

                                                    Varicella 

(varivax)(chicken 

pox)                                                2016 

00:00:00            Completed                               [facility]

 

                                                    Varicella 

(varivax)(chicken 

pox)                                                2016 

00:00:00            Completed                               [facility]

 

                                                    Varicella 

(varivax)(chicken 

pox)                                                2016 

00:00:00            Completed                               [facility]

 

                                                    Varicella 

(varivax)(chicken 

pox)                                                2016 

00:00:00            Completed                               [facility]

 

                                                    Varicella 

(varivax)(chicken 

pox)                                                2016 

00:00:00            Completed                               [facility]

 

                                                    Varicella 

(varivax)(chicken 

pox)                                                2016 

00:00:00            Completed                               [facility]

 

                                                    Varicella 

(varivax)(chicken 

pox)                                                2016 

00:00:00            Completed                               [facility]

 

                                                    Varicella 

(varivax)(chicken 

pox)                                                2016 

00:00:00            Completed                               [facility]

 

                                                    Varicella 

(varivax)(chicken 

pox)                                                2016 

00:00:00            Completed                               [facility]

 

                                                    Influenza Virus 

Vaccine (3+ yrs)                                    2014-10-14 

00:00:00            Completed                               [facility]

 

                                                    Influenza Virus 

Vaccine (3+ yrs)                                    2014-10-14 

00:00:00            Completed                               [facility]

 

                                                    Influenza Virus 

Vaccine (3+ yrs)                                    2014-10-14 

00:00:00            Completed                               [facility]

 

                                                    Influenza Virus 

Vaccine (3+ yrs)                                    2014-10-14 

00:00:00            Completed                               [facility]

 

                                                    Influenza Virus 

Vaccine (3+ yrs)                                    2014-10-14 

00:00:00            Completed                               [facility]

 

                                                    Influenza Virus 

Vaccine (3+ yrs)                                    2014-10-14 

00:00:00            Completed                               [facility]

 

                                                    Influenza Virus 

Vaccine (3+ yrs)                                    2014-10-14 

00:00:00            Completed                               [facility]

 

                                                    Influenza Virus 

Vaccine (3+ yrs)                                    2014-10-14 

00:00:00            Completed                               [facility]

 

                                                    Influenza Virus 

Vaccine (3+ yrs)                                    2014-10-14 

00:00:00            Completed                               [facility]

 

                                                    Influenza Virus 

Vaccine (3+ yrs)                                    2014-10-14 

00:00:00            Completed                               [facility]

 

                                                    Influenza Virus 

Vaccine (3+ yrs)                                    2014-10-14 

00:00:00            Completed                               [facility]

 

                                                    Influenza Virus 

Vaccine (3+ yrs)                                    2014-10-14 

00:00:00            Completed                               [facility]

 

                                                    Influenza Virus 

Vaccine (3+ yrs)                                    2014-10-14 

00:00:00            Completed                               [facility]

 

                                                    Influenza Virus 

Vaccine (3+ yrs)                                    2014-10-14 

00:00:00            Completed                               [facility]

 

                                                    Influenza Virus 

Vaccine (3+ yrs)                                    2014-10-14 

00:00:00            Completed                               [facility]

 

                                                    Influenza Virus 

Vaccine (3+ yrs)                                    2014-10-14 

00:00:00            Completed                               [facility]

 

                                                    Influenza Virus 

Vaccine (3+ yrs)                                    2014-10-14 

00:00:00            Completed                               [facility]

 

                                                    Influenza Virus 

Vaccine (3+ yrs)                                    2014-10-14 

00:00:00            Completed                               [facility]

 

                                                    Influenza Virus 

Vaccine (3+ yrs)                                    2014-10-14 

00:00:00            Completed                               [facility]

 

                                                    Influenza Virus 

Vaccine (3+ yrs)                                    2014-10-14 

00:00:00            Completed                               [facility]

 

                                                    Influenza Virus 

Vaccine (3+ yrs)                                    2014-10-14 

00:00:00            Completed                               [facility]

 

                                                    Influenza Virus 

Vaccine (3+ yrs)                                    2014-10-14 

00:00:00            Completed                               [facility]

 

                                                    Influenza Virus 

Vaccine (3+ yrs)                                    2014-10-14 

00:00:00            Completed                               [facility]

 

                                                    Influenza Virus 

Vaccine (3+ yrs)                                    2014-10-14 

00:00:00            Completed                               [facility]

 

                                                    Influenza Virus 

Vaccine (3+ yrs)                                    2014-10-14 

00:00:00            Completed                               [facility]

 

                                Tdap                            2013 

00:00:00            Completed                               [facility]

 

                                Tdap                            2013 

00:00:00            Completed                               [facility]

 

                                Tdap                            2013 

00:00:00            Completed                               [facility]

 

                                Tdap                            2013 

00:00:00            Completed                               [facility]

 

                                Tdap                            2013 

00:00:00            Completed                               [facility]

 

                                Tdap                            2013 

00:00:00            Completed                               [facility]

 

                                Tdap                            2013 

00:00:00            Completed                               [facility]

 

                                Tdap                            2013 

00:00:00            Completed                               [facility]

 

                                Tdap                            2013 

00:00:00            Completed                               [facility]

 

                                Tdap                            2013 

00:00:00            Completed                               [facility]

 

                                TDAP                            2013 

00:00:00            Completed                               [facility]

 

                                TDAP                            2013 

00:00:00            Completed                               [facility]

 

                                TDAP                            2013 

00:00:00            Completed                               [facility]

 

                                TDAP                            2013 

00:00:00            Completed                               [facility]

 

                                TDAP                            2013 

00:00:00            Completed                               [facility]

 

                                TDAP                            2013 

00:00:00            Completed                               [facility]

 

                                TDAP                            2013 

00:00:00            Completed                               [facility]

 

                                TDAP                            2013 

00:00:00            Completed                               [facility]

 

                                TDAP                            2013 

00:00:00            Completed                               [facility]

 

                                TDAP                            2013 

00:00:00            Completed                               [facility]

 

                                TDAP                            2013 

00:00:00            Completed                               [facility]

 

                                TDAP                            2013 

00:00:00            Completed                               [facility]

 

                                TDAP                            2013 

00:00:00            Completed                               [facility]

 

                                Tdap                            2013 

00:00:00            Completed                               [facility]

 

                                TDAP                            2013 

00:00:00            Completed                               [facility]

 

                    TDAP                Unknown   Completed           [facility]

 

                                                    Influenza Virus 

Vaccine (3+ yrs)              Unknown      Completed                 [facility]

 

                                                    Varicella 

(varivax)(chicken 

pox)                      Unknown      Completed                 [facility]

 

                    HPV9                Unknown   Completed           [facility]







Vital Signs





                      Vital Name Observation Time Observation Value Comments   S

ource

 

                                                    Systolic blood 

pressure        2022 22:30:00 144 mm[Hg]                      [facility]

 

                                                    Diastolic blood 

pressure        2022 22:30:00 99 mm[Hg]                       [facility]

 

                      Heart rate 2022 22:30:00 74 /min               [facility]

 

                      Respiratory rate 2022 22:30:00 16 /min              

 [facility]

 

                                                    Oxygen saturation in 

Arterial blood by 

Pulse oximetry  2022 22:30:00 100 /min                        [facility]

 

                      Body temperature 2022 18:32:00 36.83 Megha            

 [facility]

 

                      Body weight 2022 18:32:00 107.049 kg            [facility]

 

                      BMI        2022 18:32:00 35.88 kg/m2            [facility]

 

                                                    Systolic blood 

pressure        2022 02:40:00 133 mm[Hg]                      [facility]

 

                                                    Diastolic blood 

pressure        2022 02:40:00 86 mm[Hg]                       [facility]

 

                      Heart rate 2022 02:40:00 76 /min               [facility]

 

                      Respiratory rate 2022 02:40:00 16 /min              

 [facility]

 

                                                    Oxygen saturation in 

Arterial blood by 

Pulse oximetry  2022 02:40:00 99 /min                         [facility]

 

                      Body temperature 2022 00:04:00 37.28 Megha            

 [facility]

 

                      Body height 2022 00:04:00 172.7 cm              [facility]

[facility]

 

                      Body weight 2022 00:04:00 107.049 kg            [facility]

[facility]

 

                      BMI        2022 00:04:00 35.88 kg/m2            [facility]

[facility]

 

                                                    Systolic blood 

pressure        2021 05:42:00 134 mm[Hg]                      [facility]

 

                                                    Diastolic blood 

pressure        2021 05:42:00 68 mm[Hg]                       [facility]

 

                      Heart rate 2021 05:42:00 107 /min              [facility]

[facility]

 

                      Body temperature 2021 05:42:00 38.11 [facility]

 

                      Respiratory rate 2021 05:42:00 18 /min              

 [facility]

 

                                                    Oxygen saturation in 

Arterial blood by 

Pulse oximetry  2021 05:42:00 98 /min                         [facility]

 

                      Body height 2021 03:25:00 172.7 cm              [facility]

 

                      Body weight 2021 03:25:00 111.585 kg            [facility]

 

                      BMI        2021 03:25:00 37.40 kg/m2            [facility]

[facility]

 

                                                    Systolic blood 

pressure        2021 13:49:00 139 mm[Hg]                      [facility]

 

                                                    Diastolic blood 

pressure        2021 13:49:00 88 mm[Hg]                       [facility]

 

                      Heart rate 2021 13:49:00 91 /min               [facility]

[facility]

 

                      Body temperature 2021 13:49:00 36.72 Megha            

 [facility]

 

                      Respiratory rate 2021 13:49:00 18 /min              

 [facility]

 

                      Body height 2021 13:49:00 172.7 cm              [facility]

[facility]

 

                      Body weight 2021 13:49:00 107.049 kg            [facility]

[facility]

 

                      BMI        2021 13:49:00 35.88 kg/m2            Univ

[facility]

 

                                                    Oxygen saturation in 

Arterial blood by 

Pulse oximetry  2021 13:49:00 99 /min                         [facility]

 

                                                    Systolic blood 

pressure        2021 06:52:00 123 mm[Hg]                      [facility]

 

                                                    Diastolic blood 

pressure        2021 06:52:00 82 mm[Hg]                       [facility]

 

                      Heart rate 2021 06:52:00 62 /min               [facility]

[facility]

 

                      Body temperature 2021 06:52:00 36.72 Megha            

 [facility]

 

                      Respiratory rate 2021 06:52:00 16 /min              

 [facility]

 

                      Body weight 2021 06:52:00 107.049 kg            [facility]

 

                      BMI        2021 06:52:00 35.88 kg/m2            [facility]

 

                                                    Oxygen saturation in 

Arterial blood by 

Pulse oximetry  2021 06:52:00 99 /min                         [facility]

 

                                                    Systolic blood 

pressure        2021 05:38:00 172 mm[Hg]                      [facility]

 

                                                    Diastolic blood 

pressure        2021 05:38:00 105 mm[Hg]                      [facility]

 

                      Heart rate 2021 05:38:00 74 /min               [facility]

[facility]

 

                      Respiratory rate 2021 05:38:00 16 /min              

 [facility]

 

                                                    Oxygen saturation in 

Arterial blood by 

Pulse oximetry  2021 05:00:00 97 /min                         [facility]

 

                      Body temperature 2021 03:02:00 37.06 Megha            

 [facility]

 

                      Body height 2021 03:02:00 172.7 cm              [facility]iv

[facility]

 

                      Body weight 2021 03:02:00 107.049 kg            Univ

[facility]

 

                      BMI        2021 03:02:00 35.88 kg/m2            [facility]

 

                      Heart rate 2021 16:59:00 89 /min               Unive

[facility]

 

                      Body temperature 2021 16:59:00 37.17 Megha            

 [facility]

 

                      Respiratory rate 2021 16:59:00 18 /min              

 [facility]

 

                      Body weight 2021 16:59:00 107.049 kg            [facility]

 

                      BMI        2021 16:59:00 35.88 kg/m2            [facility]

 

                                                    Oxygen saturation in 

Arterial blood by 

Pulse oximetry  2021 16:59:00 99 /min                         [facility]

 

                                                    Systolic blood 

pressure        2021-10-29 19:00:00 122 mm[Hg]                      [facility]

 

                                                    Diastolic blood 

pressure        2021-10-29 19:00:00 76 mm[Hg]                       [facility]

 

                      Heart rate 2021-10-29 19:00:00 54 /min               [facility]

[facility]

 

                                                    Oxygen saturation in 

Arterial blood by 

Pulse oximetry  2021-10-29 19:00:00 100 /min                        [facility]

 

                      Respiratory rate 2021-10-29 18:00:00 18 /min              

 [facility]

 

                      Body temperature 2021-10-29 14:24:00 36.72 Megha            

 [facility]

 

                      Body weight 2021-10-29 14:24:00 107.049 kg            [facility]

 

                      BMI        2021-10-29 14:24:00 35.88 kg/m2            [facility]

 

                                                    Systolic blood 

pressure        2020 00:00:00 119 mm[Hg]                      [facility]

 

                                                    Diastolic blood 

pressure        2020 00:00:00 79 mm[Hg]                       [facility]

 

                      Heart rate 2020 00:00:00 84 /min               [facility]

[facility]

 

                      Body temperature 2020 00:00:00 36.83 Megha            

 [facility]

 

                      Respiratory rate 2020 00:00:00 22 /min              

 [facility]

 

                                                    Oxygen saturation in 

Arterial blood by 

Pulse oximetry  2020 00:00:00 100 /min                        [facility]

 

                      Body weight 2020 22:45:00 107.049 kg            [facility]

 

                      BMI        2020 22:45:00 35.88 kg/m2            [facility]

 

                                                    Systolic blood 

pressure        2020 20:41:00 134 mm[Hg]                      [facility]

 

                                                    Diastolic blood 

pressure        2020 20:41:00 87 mm[Hg]                       [facility]

 

                      Heart rate 2020 20:41:00 91 /min               [facility]

[facility]

 

                      Body temperature 2020 20:41:00 37.22 Megha            

 [facility]

 

                      Respiratory rate 2020 20:41:00 18 /min              

 [facility]

 

                      Body weight 2020 20:41:00 105.235 kg            [facility]

 

                      BMI        2020 20:41:00 35.28 kg/m2            [facility]

 

                                                    Oxygen saturation in 

Arterial blood by 

Pulse oximetry  2020 20:41:00 99 /min                         [facility]

 

                                                    Oxygen saturation in 

Arterial blood by 

Pulse oximetry  2019 15:45:00 99 /min                         [facility]

 

                                                    Systolic blood 

pressure        2019 15:15:00 123 mm[Hg]                      [facility]

 

                                                    Diastolic blood 

pressure        2019 15:15:00 86 mm[Hg]                       [facility]

 

                      Heart rate 2019 15:15:00 74 /min               Unive

[facility]

 

                      Respiratory rate 2019 15:15:00 17 /min              

 [facility]

 

                      Body temperature 2019 14:00:00 36.22 Megha            

 [facility]

 

                      Body height 2019 11:12:00 172.7 cm              [facility]

 

                      Body weight 2019 11:12:00 109 kg                [facility]

 

                      BMI        2019 11:12:00 36.54 kg/m2            [facility]

 

                                                    Systolic blood 

pressure        2019-08-15 15:26:00 142 mm[Hg]                      [facility]

 

                                                    Diastolic blood 

pressure        2019-08-15 15:26:00 93 mm[Hg]                       [facility]

 

                      Heart rate 2019-08-15 15:26:00 72 /min               [facility]

[facility]

 

                      Body temperature 2019-08-15 15:26:00 35.28 Megha            

 [facility]

 

                      Respiratory rate 2019-08-15 15:26:00 16 /min              

 [facility]

 

                      Body height 2019-08-15 15:26:00 172.7 cm              [facility]

 

                      Body weight 2019-08-15 15:26:00 105.8 kg              [facility]

 

                      BMI        2019-08-15 15:26:00 35.47 kg/m2            [facility]

 

                                                    Oxygen saturation in 

Arterial blood by 

Pulse oximetry  2019-08-15 15:26:00 100 /min                        [facility]

 

                                                    Systolic blood 

pressure        2019 15:07:00 120 mm[Hg]                      [facility]

 

                                                    Diastolic blood 

pressure        2019 15:07:00 82 mm[Hg]                       [facility]

 

                      Heart rate 2019 15:06:00 68 /min               [facility]

[facility]

 

                      Body temperature 2019 15:06:00 36.67 Megha            

 [facility]

 

                      Respiratory rate 2019 15:06:00 16 /min              

 [facility]

 

                      Body height 2019 15:06:00 172.7 cm              [facility]

 

                      Body weight 2019 15:06:00 106.369 kg            [facility]

 

                      BMI        2019 15:06:00 35.66 kg/m2            [facility]

 

                                                    Systolic blood 

pressure        2019 16:16:00 124 mm[Hg]                      [facility]

 

                                                    Diastolic blood 

pressure        2019 16:16:00 78 mm[Hg]                       [facility]

 

                      Heart rate 2019 16:11:00 87 /min               [facility]

[facility]

 

                      Body temperature 2019 16:11:00 37.22 Megha            

 [facility]

 

                      Respiratory rate 2019 16:11:00 16 /min              

 [facility]

 

                      Body height 2019 16:11:00 172.7 cm              [facility]

 

                      Body weight 2019 16:11:00 110.791 kg            [facility]

 

                      BMI        2019 16:11:00 37.14 kg/m2            [facility]

 

                                                    Systolic blood 

pressure        2019 13:00:00 122 mm[Hg]                      [facility]

 

                                                    Diastolic blood 

pressure        2019 13:00:00 78 mm[Hg]                       [facility]

 

                      Heart rate 2019 13:00:00 80 /min               [facility]

[facility]

 

                      Body temperature 2019 13:00:00 36.61 Megha            

 [facility]

 

                      Respiratory rate 2019 13:00:00 20 /min              

 [facility]

 

                                                    Oxygen saturation in 

Arterial blood by 

Pulse oximetry  2019 13:00:00 99 /min                         [facility]







Procedures





                                        Procedure           Date / Time 

Performed                 Performing Clinician      Source

 

                                                    US PELVIS COMPLETE WITH 

TRANSVAGINAL        2022 22:18:00 Singer, Amando     [facility]

 

                                                    COMP. METABOLIC PANEL 

([id number])             2022 19:43:00 Singer, [facility]

 

                          CBC WITH DIFF 2022 19:43:00 Singer, Amando [facility]

 

                          URINALYSIS   2022 19:43:00 Singer, [facility]

 

                                                    ADC CLC OR LCC ONLY - 

WET PREP            2022 19:43:00 Singer, [facility]

 

                                                    CONSENT/REFUSAL FOR 

DIAGNOSIS AND TREATMENT   2022 18:24:53       Doctor Unassigned, No 

Name                                    [facility]

 

                          POCT PREGNANCY TEST 2022 01:40:00 Sloan Nelson [facility]

 

                          URINALYSIS   2022 01:38:00 Sloan Nelson [facility]

 

                          RAPID INFLUENZA A/B 2021 03:32:00 PORSCHE Power [facility]

 

                                                    COVID-19 (ID NOW RAPID 

TESTING)            2021 03:32:00 PORSCHE Power     [facility]

 

                                                    CONSENT/REFUSAL FOR 

DIAGNOSIS AND TREATMENT   2021 03:00:49       Doctor Unassigned, No 

Name                                    [facility]

 

                                                    CONSENT/REFUSAL FOR 

DIAGNOSIS AND TREATMENT   2021 13:45:43       Doctor Unassigned, No 

Name                                    [facility]

 

                                                    EMERGENCY DEPARTMENT 

DOCUMENTS                 2021 06:01:00       Doctor Unassigned, No 

Name                                    [facility]

 

                                                    CT SOFT TISSUE NECK W 

CONTRAST            2021 03:55:42 Cynthia Fitch    [facility]

 

                          PREGNANCY TEST, SERUM 2021 03:36:00 Polina Fitch [facility]

 

                                                    BASIC METABOLIC PANEL 

(NA, K, CL, CO2, 

GLUCOSE, BUN, 

CREATININE, CA)     2021 03:36:00 Cynthia Fitch    [facility]

 

                          CBC WITH DIFF 2021 03:36:00 Cynthia Fitch [facility]

 

                                                    NOTICE OF PRIVACY 

PRACTICES                 2021 02:57:07       Doctor Unassigned, No 

Name                                    [facility]

 

                                                    CONSENT/REFUSAL FOR 

DIAGNOSIS AND TREATMENT   2021 02:55:44       Doctor Unassigned, No 

Name                                    [facility]

 

                                                    RAPID STREP SCREEN FOR 

GROUP A             2021 17:03:00 Amando Narvaez     [facility]

 

                                                    CONSENT/REFUSAL FOR 

DIAGNOSIS AND TREATMENT   2021 16:53:00       Doctor Unassigned, No 

Name                                    [facility]

 

                                                    CT ABDOMEN PELVIS W 

CONTRAST            2021-10-29 17:50:39 Bharti Fuentes     [facility]

 

                                                    US PELVIS COMPLETE WITH 

TRANSVAGINAL        2021-10-29 16:38:35 Bharti Fuentes     [facility]

 

                          POCT PREGNANCY TEST 2021-10-29 14:34:00 Cecil Fuentes [facility]

 

                          LIPASE       2021-10-29 14:31:00 [facility]

[facility]

 

                                                    COMP. METABOLIC PANEL 

(40810)             2021-10-29 14:31:00 Bharti Fuentes     [facility]

 

                          CBC WITH DIFF 2021-10-29 14:31:00 Bharti Fuentes [facility]

 

                          URINALYSIS   2021-10-29 14:31:00 [facility]

[facility]

 

                                                    COVID-19 (ID NOW RAPID 

TESTING)            2021-10-29 14:31:00 Bharti Fuentes     [facility]

 

                                                    NOTICE OF PRIVACY 

PRACTICES                 2021-10-29 14:20:02       Doctor Unassigned, No 

Name                                    [facility]

 

                                                    CONSENT/REFUSAL FOR 

DIAGNOSIS AND TREATMENT   2021-10-29 14:19:50       Doctor Unassigned, No 

Name                                    [facility]

 

                                                    RAPID STREP SCREEN FOR 

GROUP A             2020 23:28:00 Cristobal Rodrigues [facility]

 

                          POCT PREGNANCY TEST 2020 23:01:00 Arnaldo Rodrigues [facility]

 

                                                    NOTICE OF PRIVACY 

PRACTICES                 2020 22:36:17       Doctor Unassigned, No 

Name                                    [facility]

 

                                                    CONSENT/REFUSAL FOR 

DIAGNOSIS AND TREATMENT   2020 22:36:05       Doctor Unassigned, No 

Name                                    [facility]

 

                                                    INDIRECT ANTIGLOBULIN 

TEST                      2019 11:30:00       Katy Jones                                   [facility]

 

                                ASSIGNMENT OF BENEFITS 2019 09:55:52 Docto

r Unassigned, No 

Name                                    [facility]

 

                          TYPE AND SCREEN 2019-08-15 15:52:00 Carlos Eduardo Sanders

[facility]

 

                          CBC WITH DIFFERENTIAL 2019-08-15 15:51:00 Lois Sanders rn [facility]

 

                                ASSIGNMENT OF BENEFITS 2019-08-15 15:15:47 Docto

r Unassigned, No 

Name                                    [facility]

 

                                DAY SURGERY - [place] 2019-08-15 05:01:00 Doct

or Unassigned, No 

Name                                    [facility]

 

                                CBC WITH DIFFERENTIAL 2019 07:36:00 Carina Ceron                            [facility]

 

                          VENOUS CORD GAS 2019 03:29:00 Brennan Taylor

[facility]

 

                           SECTION 2019 02:20:00 Edson Pierce [facility]

 

                          URIC ACID    2019 02:06:00 Catrachito Butt [facility]

 

                          CBC WITH DIFFERENTIAL 2019 02:06:00 Mikhail Butt [facility]

 

                                                    SGOT (ASPARTATE AMINO 

TRANSFER)           2019 02:06:00 Catrachito Butt      [facility]

 

                          CREATININE   2019 02:06:00 [facility]

[facility]

 

                                                    ALANINE AMINO 

TRANSFERASE(SGPT    2019 02:06:00 aCtrachito Butt      [facility]

 

                          LACTATE DEHYDROGENASE 2019 02:06:00 Mikhail Butt [facility]

 

                          URINALYSIS   2019 02:05:00 Catrachito Butt

[facility]

 

                                                    PROTEIN CREAT RATIO 

URINE RANDOM        2019 02:05:00 Catrachito Butt      [facility]

 

                          CBC WITH DIFFERENTIAL 2019 16:13:00 [facility]

 

                                                    HEPATITIS B SURFACE 

ANTIGEN             2019 16:13:00 Claudia, [facility]

 

                                                    GALV ONLY - SYPHILIS 

IGG/IGM             2019 16:13:00 Claudia, [facility]

 

                          TYPE AND SCREEN 2019 15:54:00 Claudia, [facility]







Encounters





                                                    Start 

Date/Time                               End 

Date/Time                               Encounter 

Type                                    Admission 

Type                                    Attending 

[facility] 

Facility                                Care 

Department                              Encounter 

ID                                      Source

 

                                                    2021-10-30 

07:04:42         Emergency                 [facility]    [id number] [facility]

 

                                                    2021-10-28 

23:03:26         Emergency                 [facility]    [id number] [facility]

 

                                                    2023 

14:53:18                                2023 

14:53:18   Outpatient                       SFA        [facility]        [id number]

[id number]                                    Michael BOSWELL 

[place]

 

                                                    2022 

13:33:00                                2022 

18:01:00            Emergency           X                   AMANDO NARVAEZ         [place]            [place]             [id number]      [facility]

 

                                                    2022 

13:33:00                                2022 

18:01:00            Emergency                               Amando Narvaez                                 [facility]                                  [id number]

[id number]

[id number]

[phone]

084                       [id number]                  [facility]

 

                                                    2022 

19:06:00                                2022 

21:48:00            Emergency           X                   SLOAN NELSON          [place]            [place]             [id number]      [facility]

 

                                                    2022 

19:06:00                                2022 

21:48:00            Emergency                               Sloan Nelson                                [facility]                                  [id number]

[id number]

[id number]

[phone]

084                       [id number]                  [facility]

 

                                                    2021 

21:27:00                                2021 

23:45:00  Emergency X         PORSCHE POWER [place]      [place]       [id number] [facility]

 

                                                    2021 

21:27:00                                2021 

23:45:00            Emergency                               PORSCHE Power                                   [facility]                                  [id number]

[id number]

[id number]

[phone]

084                       [id number]                  [facility]

 

                                                    2021 

00:00:00                                2021 

00:00:00                                Patient 

Secure Msg                                          Doctor 

Unassigned, 

No Name                                 [facility]                                1.20.114

[id number]

[id number]

[phone]

019                       [id number]                  [facility]

 

                                                    2021 

07:49:00                                2021 

08:30:00            Emergency           AMANDO PANDYA         [place]            [place]             [id number]      [facility]

 

                                                    2021 

07:49:00                                2021 

08:30:00            Emergency                               Amando Narvaez                                 [facility]                                  1.2840.114

[id number]

4.2.7.2.686

[phone]

084                       [id number]                  [facility]

 

                                                    2021 

00:53:00                                2021 

02:30:00            Emergency                               Tom Bustillo                                  [facility]                                  1.2840.114

[id number]

[id number]

[phone]

014                       [id number]                  [facility]

 

                                                    2021 

00:00:00                                2021 

00:00:00                                Orders 

Only                                                Doctor 

Unassigned, 

No Name                                 [facility]                                1.2840.114

[id number]

[id number]

[phone]

009                       [id number]                  [facility]

 

                                                    2021 

21:05:00                                2021 

23:46:00            Emergency           X                   CYNTHIA FITCH         [place]            [place]             [id number]      [facility]

 

                                                    2021 

21:05:00                                2021 

23:46:00            Emergency           X                   CYNTHIA FITCH         [place]            ERT             [id number]      [facility]

 

                                                    2021 

21:05:00                                2021 

23:46:00            Emergency                               Cynthia Fitch                                 [facility]                                  1.2840.114

350.1.13.10

4.2.7.2.686

[phone]

084                       [id number]                  [facility]

 

                                                    2021 

11:03:00                                2021 

12:01:00            Emergency           X                   AMANDO NARVAEZ         [place]            [place]             [id number]      [facility]

 

                                                    2021 

11:03:00                                2021 

12:01:00            Emergency                               Amando Narvaez                                 [facility]                                  1.2840.114

350.1.13.10

4.2.7.2.686

[phone]

084                       [id number]                  [facility]

 

                                                    2021 

00:00:00                                2021 

00:00:00                                Orders 

Only                                                Doctor 

Unassigned, 

No Name                                 [facility]                                1.2840.114

350.1.13.10

4.2.7.2.686

[phone]

009                       [id number]                  [facility]

 

                                                    2021-10-29 

09:23:00                                2021-10-29 

14:05:00            Emergency           X                   ALFREDOBHARTI         [place]            [place]             [id number]      [facility]

 

                                                    2021-10-29 

09:23:00                                2021-10-29 

14:05:00            Emergency                               AlfredoBharti                                 [facility]                                  1.2840.114

350.1.13.10

4.2.7.2.686

[phone]

084                       [id number]                  [facility]

 

                                                    2021-10-29 

00:00:00                                2021-10-29 

00:00:00                                Orders 

Only                                                Doctor 

Unassigned, 

No Name                                 [facility]                                1.2840.114

350.1.13.10

4.2.7.2.686

[phone]

009                       [id number]                  [facility]

 

                                                    2020 

00:00:00                                2020 

00:00:00            Telephone                               Ivette 

Fabienne                                   [facility]                                1.840.114

350.1.13.10

4.2.7.2.686

[phone]

019                       [id number]                  [facility]

 

                                                    2020 

16:49:00                                2020 

18:31:00            Emergency                               Cristobal Rodrigues                               [facility]                                  1..114

350.1.13.10

4.2.7.2.686

[phone]

084                       [id number]                  [facility]

 

                                                    2020 

00:00:00                                2020 

00:00:00                                Orders 

Only                                                Doctor 

Unassigned, 

No Name                                 [facility]                                1..114

350.1.13.10

4.2.7.2.686

[phone]

009                       [id number]                  [facility]

 

                                                    2020 

11:00:00                                2020 

11:00:00            Outpatient          R                   SABINA VAILEZ         [facility]            [id number]      [facility]

 

                                                    2020 

00:00:00                                2020 

00:00:00            Telephone                               Jasmin Pulido                                       [facility] 

Office 

Building 

One                                     .114

350.1.13.10

4.2.7.2.686

[phone]

044                       [id number]                  [facility]

 

                                                    2020 

15:27:21                                2020 

15:47:21                                Urgent 

Care                                                [facility]

Jasmin Pulido                                       [facility] 

Office 

Building 

One                                     1.2.840.114

350.1.13.10

4.2.7.2.686

[phone]

044                       [id number]                  [facility]

 

                                                    2020 

15:40:00                                2020 

15:40:00  Outpatient R                   [facility]      [id number] [facility]

 

                                                    2019 

04:56:19                                2019 

10:45:00                                Hospital 

Encounter                                           [facility]                                1.2.840.114

350.1.13.10

4.2.7.2.686

[phone]

104                       [id number]                  [facility]

 

                                                    2019 

00:00:00                                2019 

00:00:00                                Orders 

Only                                                Doctor 

Unassigned, 

No Name                                 [facility]                                1.2.840.114

350.1.13.10

4.2.7.2.686

[phone]

009                       [id number]                  [facility]

 

                                                    2019-08-15 

10:17:00                                2019-08-15 

13:55:00                                Hospital 

Encounter                                           [facility]                                1.2.840.114

350.1.13.10

4.2.7.2.686

[phone]

101                       [id number]                  [facility]

 

                                                    2019-08-15 

00:00:00                                2019-08-15 

00:00:00                                Orders 

Only                                                Doctor 

Unassigned, 

No Name                                 [facility]                                1.2.840.114

350.1.13.10

4.2.7.2.686

[phone]

009                       [id number]                  [facility]

 

                                                    2019-08-15 

00:00:00                                2019-08-15 

00:00:00                                Prep For 

Surgery                                             Katy Jones                                   [facility]                                 1.2.840.114

350.1.13.10

4.2.7.2.686

[phone]

113                       [id number]                  [facility]

 

                                                    2019 

09:50:02                                2019 

10:35:34                                Routine 

Prenatal 

Visit                                               Crystal Kay                              [facility] 

[place]                                1.2.840.114

350.1.13.10

4.2.7.2.686

[phone]

107                       [id number]                  [facility]

 

                                                    2019 

10:43:43                                2019 

11:33:58                                Nurse 

Visit                                               Visit, 

Ang[facility]Rm[facility] 

Nurse

Harleen Cho                              [facility] 

[facility] 

[facility] 

& CHILD 

[facility]                                1.2.840.114

350.1.13.10

4.2.7.2.686

[phone]

107                       [id number]                  [facility]

 

                                                    2019 

09:36:00                                2019 

15:52:00                                Hospital 

Encounter                                           Shanell Núñez                                [facility]                                1.2.840.114

350.1.13.10

4.2.7.2.686

[phone]

063                       [id number]                  [facility]

 

                                                    2019 

00:00:00                                2019 

00:00:00                                Prep For 

Surgery                                             [facility]                                 [facility]                                 1.2840.114

350.1.13.10

4.2.7.2.686

[phone]

113                       [id number]                  [facility]







Results





                    Test Description Test Time Test Comments Results   Result Co

mments Source







                                        

 

                                        

 

                                        

 

                                        

 

                                        

 

                                        

 

                                        

 

                                        

 

                                        

 

                                        

 

                                        

 

                                        

 

                                        

 

                                        

 

                                        

 

                                        

 

                                        

 

                                        

 

                                        

 

                                        





[facility] WITH [id number] 19:53:31* 



                      Test Item  Value      Reference Range Interpretation Comme

nts

 

                                                    WBC (test code = 

6690-2)                         See_Comment                     [Automated messa

ge] 

The system which 

generated this 

result transmitted 

reference range: 

4.30 - 11.10 

10*3/?L. The 

reference range was 

not used to 

interpret this 

result as 

normal/abnormal.

 

                                                    RBC (test code = 

789-8)                          See_Comment                     [Automated messa

ge] 

The system which 

generated this 

result transmitted 

reference range: 

3.93 - 5.25 

10*6/?L. The 

reference range was 

not used to 

interpret this 

result as 

normal/abnormal.

 

                                                    HGB (test code = 

718-7)          9.7 g/dL        11.6-15.0       L               

 

                                                    HCT (test code = 

4544-3)         32.3 %          35.7-45.2       L               

 

                                                    MCV (test code = 

787-2)          76.0 fL         80.6-95.5       L               

 

                                                    MCH (test code = 

785-6)          22.8 pg         25.9-32.8       L               

 

                                                    MCHC (test code = 

786-4)          30.0 g/dL       31.6-35.1       L               

 

                                                    RDW-SD (test code = 

76323-2)        50.2 fL         39.0-49.9       H               

 

                                                    RDW-CV (test code = 

788-0)          18.1 %          12.0-15.5       H               

 

                                                    PLT (test code = 

777-3)                          See_Comment     H               [Automated messa

ge] 

The system which 

generated this 

result transmitted 

reference range: 

166 - 358 10*3/?L. 

The reference range 

was not used to 

interpret this 

result as 

normal/abnormal.

 

                                                    MPV (test code = 

28902-8)        9.8 fL          9.5-12.9                        

 

                                                    NRBC/100 WBC (test 

code = 7390381200)                 See_Comment                     [Automated me

ssage] 

The system which 

generated this 

result transmitted 

reference range: 

0.0 - 10.0 /100 

WBCs. The reference 

range was not used 

to interpret this 

result as 

normal/abnormal.

 

                                                    NRBC x10^3 (test code 

= 4924208878)   <0.01           See_Comment                     [Automated messa

ge] 

The system which 

generated this 

result transmitted 

reference range: 

10*3/?L. The 

reference range was 

not used to 

interpret this 

result as 

normal/abnormal.

 

                                                    GRAN MAT (NEUT) % 

(test code = 770-8) 51.2 %                                          

 

                                                    IMM GRAN % (test code 

= 1081026165)   0.20 %                                          

 

                                                    LYMPH % (test code = 

736-9)          36.1 %                                          

 

                                                    MONO % (test code = 

5905-5)         9.4 %                                           

 

                                                    EOS % (test code = 

713-8)          2.4 %                                           

 

                                                    BASO % (test code = 

706-2)          0.7 %                                           

 

                                                    GRAN MAT x10^3(ANC) 

(test code = 

3511048963)     2.95 10*3/uL    1.88-7.09                       

 

                                                    IMM GRAN x10^3 (test 

code = 3074144301) <0.03           0.00-0.06                       

 

                                                    LYMPH x10^3 (test code 

= 731-0)        2.08 10*3/uL    1.32-3.29                       

 

                                                    MONO x10^3 (test code 

= 742-7)        0.54 10*3/uL    0.33-0.92                       

 

                                                    EOS x10^3 (test code = 

711-2)          0.14 10*3/uL    0.03-0.39                       

 

                                                    BASO x10^3 (test code 

= 704-7)        0.04 10*3/uL    0.01-0.07                       

 

                                                    Lab Interpretation 

(test code = 01817-1) Abnormal                                        





[id number]* 



                      Test Item  Value      Reference Range Interpretation Comme

nts

 

                      POCT PREG (test code = 1605) negative                     

    

 

                                                    On board controls acceptable

 with 

C Line (test code = 3574) present                                         

 

                      POCT PREG LOT # (test code = 3575) [id number]             

          

 

                                                    POCT PREG TEST  DATE (

test 

code = 3576)                                          

 

                                                    Lab Interpretation (test cod

e = 

81497-4)        Normal                                          





[facility] [id number] 03:59:03* 



                      Test Item  Value      Reference Range Interpretation Comme

nts

 

                                                    PREG SERUM (test code 

= 1048263105)   Negative                                        

 

                                        ALFONZO (test code = ALFONZO) Less than 10 IU/L.

 ?If 

low titer or ectopic 

pregnancy is suspected, 

resubmit specimen in 

48-72 hours.                                                





[facility] METABOLIC PANEL (NA, K, CL, CO2, 
GLUCOSE, BUN, CREATININE, CA)2021 03:55:47* 



                      Test Item  Value      Reference Range Interpretation Comme

nts

 

                                                    NA (test code = 

1258436421)     135 mmol/L      135-145                         

 

                                                    K (test code = 

7147724075)     4.8 mmol/L      3.5-5.0                         

 

                                                    CL (test code = 

3417578622)     102 mmol/L                                

 

                                                    CO2 TOTAL (test code = 

1373281225)     25 mmol/L       23-31                           

 

                                                    AGAP (test code = 

2020206525)                     2-16                            

 

                                                    BUN (test code = 

1290651406)     12 mg/dL        7-23                            

 

                                                    GLUCOSE (test code = 

2842859969)     129 mg/dL                 H               

 

                                                    CREATININE (test code = 

4533732942)     0.70 mg/dL      0.50-1.04                       

 

                                                    CALCIUM (test code = 

0097092867)     9.1 mg/dL       8.6-10.6                        

 

                                                    eGFR (test code = 

8365747478)                     mL/min/1.73m2                   

 

                                        ALFONZO (test code = ALFONZO) Association of 

Glomerular Filtration 

Rate (GFR) and Staging 

of Kidney Disease* 

+---------------------

--+-------------------

--+-------------------

------+| GFR 

(mL/min/1.73 m2) ?| 

With Kidney Damage ?| 

?Without Kidney 

Damage+---------------

--------+-------------

--------+-------------

------------+| ?>90 ? 

? ? ? ? ? ? ? ?| 

?Stage one ? ? ? ? ?| 

? Normal ? ? ? ? ? ? ? 

?+--------------------

---+------------------

---+------------------

-------+| ?60-89 ? ? ? 

? ? ? ? ?| ?Stage two 

? ? ? ? ?| ? Decreased 

GFR ? ? ? ? 

+---------------------

--+-------------------

--+-------------------

------+| ?30-59 ? ? ? 

? ? ? ? ?| ?Stage 

three ? ? ? ?| ? Stage 

three ? ? ? ? ? 

+---------------------

--+-------------------

--+-------------------

------+| ?15-29 ? ? ? 

? ? ? ? ?| ?Stage four 

? ? ? ? | ? Stage four 

? ? ? ? ? 

?+--------------------

---+------------------

---+------------------

-------+| ?<15 (or 

dialysis) ? ?| ?Stage 

five ? ? ? ? | ? Stage 

five ? ? ? ? ? 

?+--------------------

---+------------------

---+------------------

-------+ *Each stage 

assumes the associated 

GFR level has been in 

effect for at least 

three months. ?Stages 

1 to 5, with or 

without kidney 

disease, indicate 

chronic kidney 

disease. Notes: 

Determination of 

stages one and two 

(with eGFR 

>59mL/min/1.73 m2) 

requires estimation of 

kidney damage for at 

least three months as 

defined by structural 

or functional 

abnormalities of the 

kidney, manifested by 

either:Pathological 

abnormalities or 

Markers of kidney 

damage (including 

abnormalities in the 

composition of the 

blood or urine or 

abnormalities in 

imaging tests).                                             

 

                                                    Lab Interpretation 

(test code = 95956-3) Abnormal                                        





[facility] WITH [id number] 03:44:45* 



                      Test Item  Value      Reference Range Interpretation Comme

nts

 

                                                    WBC (test code = 

6690-2)                         See_Comment                     [Automated [employer]] 

The system which 

generated this 

result transmitted 

reference range: 

4.30 - 11.10 

10*3/?L. The 

reference range was 

not used to 

interpret this 

result as 

normal/abnormal.

 

                                                    RBC (test code = 

789-8)                          See_Comment                     [Automated [employer]] 

The system which 

generated this 

result transmitted 

reference range: 

3.93 - 5.25 

10*6/?L. The 

reference range was 

not used to 

interpret this 

result as 

normal/abnormal.

 

                                                    HGB (test code = 

718-7)          9.6 g/dL        11.6-15.0       L               

 

                                                    HCT (test code = 

4544-3)         31.1 %          35.7-45.2       L               

 

                                                    MCV (test code = 

787-2)          76.4 fL         80.6-95.5       L               

 

                                                    MCH (test code = 

785-6)          23.6 pg         25.9-32.8       L               

 

                                                    MCHC (test code = 

786-4)          30.9 g/dL       31.6-35.1       L               

 

                                                    RDW-SD (test code = 

48744-9)        45.8 fL         39.0-49.9                       

 

                                                    RDW-CV (test code = 

788-0)          16.6 %          12.0-15.5       H               

 

                                                    PLT (test code = 

777-3)                          See_Comment                     [Automated messa

ge] 

The system which 

generated this 

result transmitted 

reference range: 

166 - 358 10*3/?L. 

The reference range 

was not used to 

interpret this 

result as 

normal/abnormal.

 

                                                    MPV (test code = 

51033-1)        9.7 fL          9.5-12.9                        

 

                                                    NRBC/100 WBC (test 

code = 4597404100)                 See_Comment                     [Automated me

ssage] 

The system which 

generated this 

result transmitted 

reference range: 

0.0 - 10.0 /100 

WBCs. The reference 

range was not used 

to interpret this 

result as 

normal/abnormal.

 

                                                    NRBC x10^3 (test code 

= 2195488021)   <0.01           See_Comment                     [Automated messa

ge] 

The system which 

generated this 

result transmitted 

reference range: 

10*3/?L. The 

reference range was 

not used to 

interpret this 

result as 

normal/abnormal.

 

                                                    GRAN MAT (NEUT) % 

(test code = 770-8) 49.9 %                                          

 

                                                    IMM GRAN % (test code 

= 7162280898)   0.20 %                                          

 

                                                    LYMPH % (test code = 

736-9)          38.3 %                                          

 

                                                    MONO % (test code = 

5905-5)         9.2 %                                           

 

                                                    EOS % (test code = 

713-8)          1.7 %                                           

 

                                                    BASO % (test code = 

706-2)          0.7 %                                           

 

                                                    GRAN MAT x10^3(ANC) 

(test code = 

4356348602)     3.00 10*3/uL    1.88-7.09                       

 

                                                    IMM GRAN x10^3 (test 

code = 1653433234) <0.03           0.00-0.06                       

 

                                                    LYMPH x10^3 (test code 

= 731-0)        2.30 10*3/uL    1.32-3.29                       

 

                                                    MONO x10^3 (test code 

= 742-7)        0.55 10*3/uL    0.33-0.92                       

 

                                                    EOS x10^3 (test code = 

711-2)          0.10 10*3/uL    0.03-0.39                       

 

                                                    BASO x10^3 (test code 

= 704-7)        0.04 10*3/uL    0.01-0.07                       

 

                                                    Lab Interpretation 

(test code = 10935-8) Abnormal                                        





[facility] 14:54:39* 



                      Test Item  Value      Reference Range Interpretation Comme

nts

 

                                                    NA (test code = 

5459361697)     137 mmol/L      135-145                         

 

                                                    K (test code = 

4420203791)     3.8 mmol/L      3.5-5.0                         

 

                                                    CL (test code = 

1043003718)     105 mmol/L                                

 

                                                    CO2 TOTAL (test code = 

8543256779)     25 mmol/L       23-31                           

 

                                                    AGAP (test code = 

5678808979)                     2-16                            

 

                                                    BUN (test code = 

4353494152)     9 mg/dL         7-23                            

 

                                                    GLUCOSE (test code = 

7877300781)     126 mg/dL                 H               

 

                                                    CREATININE (test code = 

4113707554)     0.76 mg/dL      0.50-1.04                       

 

                                                    TOTAL BILI (test code = 

9337229073)     0.4 mg/dL       0.1-1.1                         

 

                                                    CALCIUM (test code = 

2255905332)     9.1 mg/dL       8.6-10.6                        

 

                                                    T PROTEIN (test code = 

3543889393)     7.5 g/dL        6.3-8.2                         

 

                                                    ALBUMIN (test code = 

5274466528)     4.0 g/dL        3.5-5.0                         

 

                                                    ALK PHOS (test code = 

4141409790)     68 U/L                                    

 

                                                    ALTv (test code = 

1742-6)         13 U/L          5-35                            

 

                                                    AST(SGOT) (test code = 

0871852674)     23 U/L          13-40                           

 

                                                    eGFR (test code = 

8597759959)                     mL/min/1.73m2                   

 

                                        ALFONZO (test code = ALFONZO) Association of 

Glomerular Filtration 

Rate (GFR) and Staging 

of Kidney Disease* 

+---------------------

--+-------------------

--+-------------------

------+| GFR 

(mL/min/1.73 m2) ?| 

With Kidney Damage ?| 

?Without Kidney 

Damage+---------------

--------+-------------

--------+-------------

------------+| ?>90 ? 

? ? ? ? ? ? ? ?| 

?Stage one ? ? ? ? ?| 

? Normal ? ? ? ? ? ? ? 

?+--------------------

---+------------------

---+------------------

-------+| ?60-89 ? ? ? 

? ? ? ? ?| ?Stage two 

? ? ? ? ?| ? Decreased 

GFR ? ? ? ? 

+---------------------

--+-------------------

--+-------------------

------+| ?30-59 ? ? ? 

? ? ? ? ?| ?Stage 

three ? ? ? ?| ? Stage 

three ? ? ? ? ? 

+---------------------

--+-------------------

--+-------------------

------+| ?15-29 ? ? ? 

? ? ? ? ?| ?Stage four 

? ? ? ? | ? Stage four 

? ? ? ? ? 

?+--------------------

---+------------------

---+------------------

-------+| ?<15 (or 

dialysis) ? ?| ?Stage 

five ? ? ? ? | ? Stage 

five ? ? ? ? ? 

?+--------------------

---+------------------

---+------------------

-------+ *Each stage 

assumes the associated 

GFR level has been in 

effect for at least 

three months. ?Stages 

1 to 5, with or 

without kidney 

disease, indicate 

chronic kidney 

disease. Notes: 

Determination of 

stages one and two 

(with eGFR 

>59mL/min/1.73 m2) 

requires estimation of 

kidney damage for at 

least three months as 

defined by structural 

or functional 

abnormalities of the 

kidney, manifested by 

either:Pathological 

abnormalities or 

Markers of kidney 

damage (including 

abnormalities in the 

composition of the 

blood or urine or 

abnormalities in 

imaging tests).                                             

 

                                                    Lab Interpretation 

(test code = 63547-5) Abnormal                                        





[facility]21[facility] 14:54:38* 



                      Test Item  Value      Reference Range Interpretation Comme

nts

 

                      LIPASE (test code = 2505120567) 78 U/L     0-220          

       

 

                                                    Lab Interpretation (test cod

e = 

06195-6)        Normal                                          





[facility]2021-10-29 14:41:38* 



                      Test Item  Value      Reference Range Interpretation Comme

nts

 

                                                    WBC (test code = 

6690-2)                         See_Comment                     [Automated [employer]] 

The system which 

generated this 

result transmitted 

reference range: 

4.30 - 11.10 

10*3/?L. The 

reference range was 

not used to 

interpret this 

result as 

normal/abnormal.

 

                                                    RBC (test code = 

789-8)                          See_Comment                     [Automated messa

[employer]] 

The system which 

generated this 

result transmitted 

reference range: 

3.93 - 5.25 

10*6/?L. The 

reference range was 

not used to 

interpret this 

result as 

normal/abnormal.

 

                                                    HGB (test code = 

718-7)          9.6 g/dL        11.6-15.0       L               

 

                                                    HCT (test code = 

4544-3)         31.4 %          35.7-45.2       L               

 

                                                    MCV (test code = 

787-2)          76.2 fL         80.6-95.5       L               

 

                                                    MCH (test code = 

785-6)          23.3 pg         25.9-32.8       L               

 

                                                    MCHC (test code = 

786-4)          30.6 g/dL       31.6-35.1       L               

 

                                                    RDW-SD (test code = 

41828-2)        46.2 fL         39.0-49.9                       

 

                                                    RDW-CV (test code = 

788-0)          16.8 %          12.0-15.5       H               

 

                                                    PLT (test code = 

777-3)                          See_Comment                     [Automated messa

ge] 

The system which 

generated this 

result transmitted 

reference range: 

166 - 358 10*3/?L. 

The reference range 

was not used to 

interpret this 

result as 

normal/abnormal.

 

                                                    MPV (test code = 

65472-3)        9.7 fL          9.5-12.9                        

 

                                                    NRBC/100 WBC (test 

code = 6799309209)                 See_Comment                     [Automated me

ssage] 

The system which 

generated this 

result transmitted 

reference range: 

0.0 - 10.0 /100 

WBCs. The reference 

range was not used 

to interpret this 

result as 

normal/abnormal.

 

                                                    NRBC x10^3 (test code 

= 1829197374)   <0.01           See_Comment                     [Automated messa

ge] 

The system which 

generated this 

result transmitted 

reference range: 

10*3/?L. The 

reference range was 

not used to 

interpret this 

result as 

normal/abnormal.

 

                                                    GRAN MAT (NEUT) % 

(test code = 770-8) 50.0 %                                          

 

                                                    IMM GRAN % (test code 

= 8076890462)   0.20 %                                          

 

                                                    LYMPH % (test code = 

736-9)          39.3 %                                          

 

                                                    MONO % (test code = 

5905-5)         7.1 %                                           

 

                                                    EOS % (test code = 

713-8)          2.8 %                                           

 

                                                    BASO % (test code = 

706-2)          0.6 %                                           

 

                                                    GRAN MAT x10^3(ANC) 

(test code = 

3049164055)     2.67 10*3/uL    1.88-7.09                       

 

                                                    IMM GRAN x10^3 (test 

code = 6970353635) <0.03           0.00-0.06                       

 

                                                    LYMPH x10^3 (test code 

= 731-0)        2.10 10*3/uL    1.32-3.29                       

 

                                                    MONO x10^3 (test code 

= 742-7)        0.38 10*3/uL    0.33-0.92                       

 

                                                    EOS x10^3 (test code = 

711-2)          0.15 10*3/uL    0.03-0.39                       

 

                                                    BASO x10^3 (test code 

= 704-7)        0.03 10*3/uL    0.01-0.07                       

 

                                                    Lab Interpretation 

(test code = 20065-0) Abnormal                                        





[facility] Pregnancy Test2021-10-29 14:34:00* 



                      Test Item  Value      Reference Range Interpretation Comme

nts

 

                      POCT PREG (test code = 1605) negative                     

    

 

                                                    On board controls acceptable

 with 

C Line (test code = 3574) present                                         

 

                      POCT PREG LOT # (test code = 3575) [id number]             

          

 

                                                    POCT PREG TEST  DATE (

test 

code = 3576)                                                    

 

                                                    Lab Interpretation (test cod

e = 

15713-0)        Normal                                          





[facility] [place] [facility]  
23:58:00* 



                      Test Item  Value      Reference Range Interpretation Comme

nts

 

                                                    Streptococcus pyogenes (grou

p A) 

antigen (test code = 26205-1) Positive        Negative        A               

 

                                                    Lab Interpretation (test cod

e = 

53037-4)        Abnormal                                        





[facility] PREGNANCY [id number] 23:01:00* 



                      Test Item  Value      Reference Range Interpretation Comme

nts

 

                      POCT PREG (test code = 1605) negative                     

    

 

                      POCT PREG LOT # (test code = 3575) [id number]             

          

 

                                                    POCT PREG TEST  DATE (

test 

code = 3576)    2022                                      

 

                                                    Lab Interpretation (test cod

e = 

98828-0)        Normal                                          





[facility] [id number] 13:02:18
  * 



                      Test Item  Value      Reference Range Interpretation Comme

nts

 

                                                    IAT (test code = 

1185)           Negative                                        Performed at [facility]

B 

Laboratory Services - [facility] 

Blood [address] 

[address] Free: 

[phone] No. 

[id number]





[facility] WITH DIFFERENTIAL2019-08-15 16:43:00* 



                      Test Item  Value      Reference Range Interpretation Comme

nts

 

                                                    WBC (test code = 

6690-2)                         See_Comment     L               [Automated messa

ge] 

The system which 

generated this 

result transmitted 

reference range: 

4.30 - 11.10 

10*3/?L. The 

reference range was 

not used to 

interpret this 

result as 

normal/abnormal.

 

                                                    RBC (test code = 

789-8)                          See_Comment                     [Automated messa

ge] 

The system which 

generated this 

result transmitted 

reference range: 

3.93 - 5.25 

10*6/?L. The 

reference range was 

not used to 

interpret this 

result as 

normal/abnormal.

 

                                                    HGB (test code = 

718-7)          10.6 g/dL       11.6-15         L               

 

                                                    HCT (test code = 

4544-3)         36.1 %          35.7-45.2                       

 

                                                    MCV (test code = 

787-2)          78.5 fL         80.6-95.5       L               

 

                                                    MCH (test code = 

785-6)          23.0 pg         25.9-32.8       L               

 

                                                    MCHC (test code = 

786-4)          29.4 g/dL       31.6-35.1       L               

 

                                                    RDW-SD (test code = 

15658-6)        54.3 fL         39-49.9         H               

 

                                                    RDW-CV (test code = 

788-0)          19.1 %          12-15.5         H               

 

                                                    PLT (test code = 

777-3)                          See_Comment                     [Automated messa

ge] 

The system which 

generated this 

result transmitted 

reference range: 

166 - 358 10*3/?L. 

The reference range 

was not used to 

interpret this 

result as 

normal/abnormal.

 

                                                    MPV (test code = 

14476-7)        11.1 fL         9.5-12.9                        

 

                                                    NRBC/100 WBC (test 

code = 6360232263)                 See_Comment                     [Automated me

ssage] 

The system which 

generated this 

result transmitted 

reference range: 

0.0 - 10.0 /100 

WBCs. The reference 

range was not used 

to interpret this 

result as 

normal/abnormal.

 

                                                    NRBC x10^3 (test code 

= 8957332841)   <0.01           See_Comment                     [Automated messa

ge] 

The system which 

generated this 

result transmitted 

reference range: 

10*3/?L. The 

reference range was 

not used to 

interpret this 

result as 

normal/abnormal.

 

                                                    GRAN MAT (NEUT) % 

(test code = 770-8) 39.5 %                                          

 

                                                    IMM GRAN % (test code 

= 5590623015)   0.00 %                                          

 

                                                    LYMPH % (test code = 

736-9)          48.6 %                                          

 

                                                    MONO % (test code = 

5905-5)         7.7 %                                           

 

                                                    EOS % (test code = 

713-8)          2.9 %                                           

 

                                                    BASO % (test code = 

706-2)          1.3 %                                           

 

                                                    GRAN MAT x10^3(ANC) 

(test code = 

1459034680)     1.23 10*3/uL    1.88-7.09       L               

 

                                                    IMM GRAN x10^3 (test 

code = 5034576151) <0.03           0-0.06                          

 

                                                    LYMPH x10^3 (test code 

= 731-0)        1.51 10*3/uL    1.32-3.29                       

 

                                                    MONO x10^3 (test code 

= 742-7)        0.24 10*3/uL    0.33-0.92       L               

 

                                                    EOS x10^3 (test code = 

711-2)          0.09 10*3/uL    0.03-0.39                       

 

                                                    BASO x10^3 (test code 

= 704-7)        0.04 10*3/uL    0.01-0.07                       

 

                                                    ELLIPTO/OVAL (test 

code = 64553-0) 2+              See_Comment     A               [Automated messa

ge] 

The system which 

generated this 

result transmitted 

reference range: 

(none). The 

reference range was 

not used to 

interpret this 

result as 

normal/abnormal.

 

                                                    Lab Interpretation 

(test code = 01724-5) Abnormal                                        





[facility] - The Type and Screen expires 
at midnight on the 3rd day after it was drawn. A current Type and Screen is 
required when RBCs are requested. For all other blood products, a Type and Scr
een performed during the current hospitalizati...2019-08-15 16:35:07* 



                      Test Item  Value      Reference Range Interpretation Comme

nts

 

                                                    ABO & RH (test code 

= 20)           O POSITIVE                                      Performed at [facility] 

Laboratory Services - 

[facility] Blood [address] Free: 

[phone] No. 

[id number]

 

                                                    IAT (test code = 

1185)           Negative                                        Performed at [facility] 

Laboratory Services - 

[facility] Blood [address] Free: 

[phone] No. 

[id number]





[facility] - SYPHILIS IGG/[id number] 
14:50:00* 



                      Test Item  Value      Reference Range Interpretation Comme

nts

 

                                                    Syphilis IgG/IgM (test 

code = 13309-4) Non-reactive    Non-reactive                    

 

                                        ALFONZO (test code = ALFONZO) Non-reactive - No 

serologic evidence 

of T. pallidum 

infection. Cannot 

exclude incubating 

or early syphilis. 

Submit a second 

specimen in 2-4 

weeks if syphilis is 

clinically 

suspected.Equivocal 

- Further testing to 

follow.Reactive - 

Further testing to 

follow.                                                     

 

                                                    Lab Interpretation (test 

code = 93548-9) Normal                                          





[facility] WITH [id number] 07:57:00* 



                      Test Item  Value      Reference Range Interpretation Comme

nts

 

                                                    WBC (test code = 

6690-2)                         See_Comment                     [Automated messa

ge] 

The system which 

generated this 

result transmitted 

reference range: 

4.30 - 11.10 

10*3/?L. The 

reference range was 

not used to 

interpret this 

result as 

normal/abnormal.

 

                                                    RBC (test code = 

789-8)                          See_Comment     L               [Automated messa

ge] 

The system which 

generated this 

result transmitted 

reference range: 

3.93 - 5.25 

10*6/?L. The 

reference range was 

not used to 

interpret this 

result as 

normal/abnormal.

 

                                                    HGB (test code = 

718-7)          7.9 g/dL        11.6-15         L               

 

                                                    HCT (test code = 

4544-3)         27.1 %          35.7-45.2       L               

 

                                                    MCV (test code = 

787-2)          79.9 fL         80.6-95.5       L               

 

                                                    MCH (test code = 

785-6)          23.3 pg         25.9-32.8       L               

 

                                                    MCHC (test code = 

786-4)          29.2 g/dL       31.6-35.1       L               

 

                                                    RDW-SD (test code = 

11491-9)        56.2 fL         39-49.9         H               

 

                                                    RDW-CV (test code = 

788-0)          19.7 %          12-15.5         H               

 

                                                    PLT (test code = 

777-3)                          See_Comment     L               [Automated messa

ge] 

The system which 

generated this 

result transmitted 

reference range: 

166 - 358 10*3/?L. 

The reference range 

was not used to 

interpret this 

result as 

normal/abnormal.

 

                                                    MPV (test code = 

41253-1)        10.3 fL         9.5-12.9                        

 

                                                    NRBC/100 WBC (test 

code = 1044407194)                 See_Comment                     [Automated me

ssage] 

The system which 

generated this 

result transmitted 

reference range: 

0.0 - 10.0 /100 

WBCs. The reference 

range was not used 

to interpret this 

result as 

normal/abnormal.

 

                                                    NRBC x10^3 (test code 

= 0794206596)   <0.01           See_Comment                     [Automated messa

ge] 

The system which 

generated this 

result transmitted 

reference range: 

10*3/?L. The 

reference range was 

not used to 

interpret this 

result as 

normal/abnormal.

 

                                                    GRAN MAT (NEUT) % 

(test code = 770-8) 76.9 %                                          

 

                                                    IMM GRAN % (test code 

= 9261390209)   0.60 %                                          

 

                                                    LYMPH % (test code = 

736-9)          14.2 %                                          

 

                                                    MONO % (test code = 

5905-5)         7.6 %                                           

 

                                                    EOS % (test code = 

713-8)          0.3 %                                           

 

                                                    BASO % (test code = 

706-2)          0.4 %                                           

 

                                                    GRAN MAT x10^3(ANC) 

(test code = 

7706731626)     5.96 10*3/uL    1.88-7.09                       

 

                                                    IMM GRAN x10^3 (test 

code = 5847179493) 0.05 10*3/uL    0-0.06                          

 

                                                    LYMPH x10^3 (test code 

= 731-0)        1.10 10*3/uL    1.32-3.29       L               

 

                                                    MONO x10^3 (test code 

= 742-7)        0.59 10*3/uL    0.33-0.92                       

 

                                                    EOS x10^3 (test code = 

711-2)          <0.03           0.03-0.39       L               

 

                                                    BASO x10^3 (test code 

= 704-7)        0.03 10*3/uL    0.01-0.07                       

 

                                                    Lab Interpretation 

(test code = 81235-3) Abnormal                                        





[facility] [id number] 03:33:00* 



                      Test Item  Value      Reference Range Interpretation Comme

nts

 

                                                    BASE EXCESS, CORD 

(test code = 

8239752278)                     mEq/L                           

 

                                                    AC PH, CORD (BEAKER) 

(test code = 

7833172873)                     7.18-7.38                       

 

                                                    PC02, CORD (test code 

= 5597390048)                   See_Comment                     [Automated messa

ge] The 

system which generated this 

result transmitted 

reference range: 32 - 66 

mmHg. The reference range 

was not used to interpret 

this result as 

normal/abnormal.

 

                                                    PO2, CORD (test code 

= 0539054987)                   See_Comment                     [Automated messa

ge] The 

system which generated this 

result transmitted 

reference range: 10 - 30 

mmHg. The reference range 

was not used to interpret 

this result as 

normal/abnormal.

 

                                                    BICARBONATE, CORD 

(test code = 

1709379810)                     See_Comment                     [Automated messa

ge] The 

system which generated this 

result transmitted 

reference range: 17 - 27 

mEq/L. The reference range 

was not used to interpret 

this result as 

normal/abnormal.





[facility] [id number] 03:31:00* 



                      Test Item  Value      Reference Range Interpretation Comme

nts

 

                                                    VENOUS BASE EXCESS, 

CORD (test code = 

3475890638)                     mEq/L                           

 

                                                    VENOUS PH, CORD (test 

code = 4023092784)                 7.25-7.45                       

 

                                                    VENOUS PC02, CORD 

(test code = 

3610874250)                     See_Comment                     [Automated messa

ge] The 

system which generated 

this result transmitted 

reference range: 27 - 49 

mmHg. The reference range 

was not used to interpret 

this result as 

normal/abnormal.

 

                                                    VENOUS PO2, CORD (test 

code = 8303290712)                 See_Comment                     [Automated me

ssage] The 

system which generated 

this result transmitted 

reference range: 17 - 41 

mmHg. The reference range 

was not used to interpret 

this result as 

normal/abnormal.

 

                                                    VENOUS BICARBONATE, 

CORD (test code = 

2159780411)                     See_Comment                     [Automated messa

ge] The 

system which generated 

this result transmitted 

reference range: 12 - 29 

mEq/L. The reference range 

was not used to interpret 

this result as 

normal/abnormal.





[facility]inalysis2019-[facility] 03:13:00* 



                      Test Item  Value      Reference Range Interpretation Comme

nts

 

                                                    APPEARANCE (test code = 

2365350462)     Hazy            Clear           A               

 

                                                    COLOR (test code = 

0896514411)     Yellow          Yellow                          

 

                                                    PH (test code = 

7046373293)                     4.8-8.0                         

 

                                                    SP GRAVITY (test code = 

5725519527)                     1.003-1.030                     

 

                                                    GLU U QUAL (test code = 

9659784543)     Normal          Normal                          

 

                                                    BLOOD (test code = 

1734507964)     1+              Negative        A               

 

                                                    KETONES (test code = 

6610294709)     80 mg/dL        Negative        A               

 

                                                    PROTEIN (test code = 

2887-8)         30 mg/dL        Negative        A               

 

                                                    UROBILIN (test code = 

5517153190)     4.0 mg/dL       Normal          A               

 

                                                    BILIRUBIN (test code = 

0380713937)     Negative        Negative                        

 

                                                    NITRITE (test code = 

9818862340)     Negative        Negative                        

 

                                                    LEUK SALLY (test code = 

9566322469)     250/uL          Negative        A               

 

                                                    RBC/HPF (test code = 

6002856807)                     See_Comment     H               [Automated messa

ge] 

The system which 

generated this 

result transmitted 

reference range: 0 - 

3 HPF. The reference 

range was not used 

to interpret this 

result as 

normal/abnormal.

 

                                                    WBC/HPF (test code = 

7447960210)                     See_Comment     H               [Automated messa

ge] 

The system which 

generated this 

result transmitted 

reference range: 0 - 

5 HPF. The reference 

range was not used 

to interpret this 

result as 

normal/abnormal.

 

                                                    BACTERIA (test code = 

2828917305)     Negative        Negative                        

 

                                                    MUCOUS (test code = 

1190750164)     Slight          Negative LPF    A               

 

                                                    SQ EPITH (test code = 

8518891441)                     See_Comment                     [Automated messa

ge] 

The system which 

generated this 

result transmitted 

reference range: <=2 

HPF. The reference 

range was not used 

to interpret this 

result as 

normal/abnormal.

 

                                                    YEAST BUD (test code = 

8327103481)     <1              See_Comment                     [Automated messa

ge] 

The system which 

generated this 

result transmitted 

reference range: <=1 

HPF. The reference 

range was not used 

to interpret this 

result as 

normal/abnormal.

 

                                                    Lab Interpretation (test 

code = 40162-7) Abnormal                                        





[facility]kin[facility]ric Acid [id number] 02:58:00* 



                      Test Item  Value      Reference Range Interpretation Comme

nts

 

                      URIC ACID (test code = 0520780994) 4.9 mg/dL  2.9-6       

          

 

                                                    Lab Interpretation (test cod

e = 

00861-4)        Normal                                          





[facility]kin[facility] [id number] 02:58:00* 



                      Test Item  Value      Reference Range Interpretation Comme

nts

 

                                                    CREATININE (test code 

= 6859644420)   0.54 mg/dL      0.5-1.04                        

 

                                                    eGFR Calculation 

(Non-) 

(test code = 

1710171432)                     mL/min/1.73m2                   

 

                                                    eGFR Calculation 

() 

(test code = 

5322723317)                     mL/min/1.73m2                   

 

                                        ALFONZO (test code = ALFONZO) Association of 

Glomerular Filtration 

Rate (GFR) and Staging 

of Kidney 

Disease*+--------------

---------+-------------

--------+--------------

-----------+| GFR 

(mL/min/1.73 m2)?| With 

Kidney Damage?|?Without 

Kidney 

Damage+----------------

-------+---------------

------+----------------

---------+|?>90?|?Stage 

one?|? 

Normal?+---------------

--------+--------------

-------+---------------

----------+|?60-89?|?St

age [age]?|? Decreased 

GFR? 

+----------------------

-+---------------------

+----------------------

---+|?30-59?|?Stage 

three?|? Stage three? 

+----------------------

-+---------------------

+----------------------

---+|?15-29?|?Stage 

four? |? Stage 

four?+-----------------

------+----------------

-----+-----------------

--------+|?<15 (or 

dialysis)?|?Stage five? 

|? Stage 

five?+-----------------

------+----------------

-----+-----------------

--------+*Each stage 

assumes the associated 

GFR level has been in 

effect for at least 

three months.?Stages 1 

to 5, with or without 

kidney disease, 

indicate chronic kidney 

disease.Notes: 

Determination of stages 

one and two (with eGFR 

>59mL/min/1.73 m2) 

requires estimation of 

kidney damage for at 

least three months as 

defined by structural 

or functional 

abnormalities of the 

kidney, manifested by 

either:Pathological 

abnormalities or 

Markers of kidney 

damage (including 

abnormalities in the 

composition of the 

blood or urine or 

abnormalities in 

imaging tests).                                             





[facility] 
02:58:00* 



                      Test Item  Value      Reference Range Interpretation Comme

[facility]

 

                      AST(SGOT) (test code = 3156244216) 28 U/L     13-40       

          

 

                                                    Lab Interpretation (test cod

e = 

68549-9)        Normal                                          





[facility]SGPT[facility]2019 
02:58:00* 



                      Test Item  Value      Reference Range Interpretation Comme

nts

 

                      ALT(SGPT) (test code = 2188972727) 22 U/L     9-51        

          

 

                                                    Lab Interpretation (test cod

e = 

94538-6)        Normal                                          





[facility]act[facility] Apmnfgylfcdby0219-86-82 02:58:00* 



                      Test Item  Value      Reference Range Interpretation Comme

nts

 

                      LDH (test code = 5104513060) 597 U/L    300-600           

    

 

                                                    Lab Interpretation (test cod

e = 

50301-9)        Normal                                          





[facility] CREAT Ratio Urine [id number] 
02:24:00* 



                      Test Item  Value      Reference Range Interpretation Comme

nts

 

                      T. PROT U (test code = 2888-6) 31 mg/dL                   

      

 

                      CREAT U (test code = 4949655219) 132.3 mg/dL              

         

 

                                                    Protein/Creatinine Ratio Uri

ne 

(test code = 6058024515)                 0.0-2.0                         





[facility] WITH [id number] 02:17:00* 



                      Test Item  Value      Reference Range Interpretation Comme

nts

 

                                                    WBC (test code = 

6690-2)                         See_Comment                     [Automated messa

ge] 

The system which 

generated this 

result transmitted 

reference range: 

4.30 - 11.10 

10*3/?L. The 

reference range was 

not used to 

interpret this 

result as 

normal/abnormal.

 

                                                    RBC (test code = 

789-8)                          See_Comment                     [Automated messa

ge] 

The system which 

generated this 

result transmitted 

reference range: 

3.93 - 5.25 

10*6/?L. The 

reference range was 

not used to 

interpret this 

result as 

normal/abnormal.

 

                                                    HGB (test code = 

718-7)          9.4 g/dL        11.6-15         L               

 

                                                    HCT (test code = 

4544-3)         33.1 %          35.7-45.2       L               

 

                                                    MCV (test code = 

787-2)          81.1 fL         80.6-95.5                       

 

                                                    MCH (test code = 

785-6)          23.0 pg         25.9-32.8       L               

 

                                                    MCHC (test code = 

786-4)          28.4 g/dL       31.6-35.1       L               

 

                                                    RDW-SD (test code = 

36226-8)        57.6 fL         39-49.9         H               

 

                                                    RDW-CV (test code = 

788-0)          20.3 %          12-15.5         H               

 

                                                    PLT (test code = 

777-3)                          See_Comment     L               [Automated messa

ge] 

The system which 

generated this 

result transmitted 

reference range: 

166 - 358 10*3/?L. 

The reference range 

was not used to 

interpret this 

result as 

normal/abnormal.

 

                                                    MPV (test code = 

19844-7)        10.8 fL         9.5-12.9                        

 

                                                    NRBC/100 WBC (test 

code = 9150138713)                 See_Comment                     [Automated me

ssage] 

The system which 

generated this 

result transmitted 

reference range: 

0.0 - 10.0 /100 

WBCs. The reference 

range was not used 

to interpret this 

result as 

normal/abnormal.

 

                                                    NRBC x10^3 (test code 

= 4058114861)   <0.01           See_Comment                     [Automated messa

ge] 

The system which 

generated this 

result transmitted 

reference range: 

10*3/?L. The 

reference range was 

not used to 

interpret this 

result as 

normal/abnormal.

 

                                                    GRAN MAT (NEUT) % 

(test code = 770-8) 77.0 %                                          

 

                                                    IMM GRAN % (test code 

= 1036015167)   0.30 %                                          

 

                                                    LYMPH % (test code = 

736-9)          14.7 %                                          

 

                                                    MONO % (test code = 

5905-5)         7.2 %                                           

 

                                                    EOS % (test code = 

713-8)          0.3 %                                           

 

                                                    BASO % (test code = 

706-2)          0.5 %                                           

 

                                                    GRAN MAT x10^3(ANC) 

(test code = 

4148022352)     6.70 10*3/uL    1.88-7.09                       

 

                                                    IMM GRAN x10^3 (test 

code = 6283428969) 0.03 10*3/uL    0-0.06                          

 

                                                    LYMPH x10^3 (test code 

= 731-0)        1.28 10*3/uL    1.32-3.29       L               

 

                                                    MONO x10^3 (test code 

= 742-7)        0.63 10*3/uL    0.33-0.92                       

 

                                                    EOS x10^3 (test code = 

711-2)          0.03 10*3/uL    0.03-0.39                       

 

                                                    BASO x10^3 (test code 

= 704-7)        0.04 10*3/uL    0.01-0.07                       

 

                                                    Lab Interpretation 

(test code = 21001-6) Abnormal                                        





[facility]kinHepatitis B Surface Pgdyquu0082-83-03 17:46:00
  * 



                      Test Item  Value      Reference Range Interpretation Comme

nts

 

                                                    HBsAg Semi-Quantitative (ozzy

t code = 

5195-3)                                                         





[facility] - ONCE [id number] 17:23:20
  * 



                      Test Item  Value      Reference Range Interpretation Comme

nts

 

                                                    ABO & RH (test code 

= 20)           O POSITIVE                                      Performed at [facility] 

Laboratory Services - 

[facility] Blood [address] 

[address]Ovpb Free: 

[phone] No. 

[id number]

 

                                                    IAT (test code = 

1185)           Negative                                        Performed at [facility] 

Laboratory Services - 

[facility] Blood [address] 

[address]Ctli Free: 

[phone] No. 

[id number]





[facility] WITH [id number] 16:41:00* 



                      Test Item  Value      Reference Range Interpretation Comme

nts

 

                                                    WBC (test code = 

6690-2)                         See_Comment                     [Automated messa

ge] 

The system which 

generated this 

result transmitted 

reference range: 

4.30 - 11.10 

10*3/?L. The 

reference range was 

not used to 

interpret this 

result as 

normal/abnormal.

 

                                                    RBC (test code = 

789-8)                          See_Comment     L               [Automated messa

ge] 

The system which 

generated this 

result transmitted 

reference range: 

3.93 - 5.25 

10*6/?L. The 

reference range was 

not used to 

interpret this 

result as 

normal/abnormal.

 

                                                    HGB (test code = 

718-7)          8.3 g/dL        11.6-15         L               

 

                                                    HCT (test code = 

4544-3)         27.8 %          35.7-45.2       L               

 

                                                    MCV (test code = 

787-2)          78.8 fL         80.6-95.5       L               

 

                                                    MCH (test code = 

785-6)          23.5 pg         25.9-32.8       L               

 

                                                    MCHC (test code = 

786-4)          29.9 g/dL       31.6-35.1       L               

 

                                                    RDW-SD (test code = 

50943-0)        55.9 fL         39-49.9         H               

 

                                                    RDW-CV (test code = 

788-0)          19.9 %          12-15.5         H               

 

                                                    PLT (test code = 

777-3)                          See_Comment     L               [Automated messa

ge] 

The system which 

generated this 

result transmitted 

reference range: 

166 - 358 10*3/?L. 

The reference range 

was not used to 

interpret this 

result as 

normal/abnormal.

 

                                                    MPV (test code = 

87646-7)        10.4 fL         9.5-12.9                        

 

                                                    NRBC/100 WBC (test 

code = 4571149513)                 See_Comment                     [Automated me

ssage] 

The system which 

generated this 

result transmitted 

reference range: 

0.0 - 10.0 /100 

WBCs. The reference 

range was not used 

to interpret this 

result as 

normal/abnormal.

 

                                                    NRBC x10^3 (test code 

= 7995185266)   <0.01           See_Comment                     [Automated messa

ge] 

The system which 

generated this 

result transmitted 

reference range: 

10*3/?L. The 

reference range was 

not used to 

interpret this 

result as 

normal/abnormal.

 

                                                    GRAN MAT (NEUT) % 

(test code = 770-8) 65.9 %                                          

 

                                                    IMM GRAN % (test code 

= 9093825296)   0.50 %                                          

 

                                                    LYMPH % (test code = 

736-9)          24.3 %                                          

 

                                                    MONO % (test code = 

5905-5)         8.1 %                                           

 

                                                    EOS % (test code = 

713-8)          0.9 %                                           

 

                                                    BASO % (test code = 

706-2)          0.3 %                                           

 

                                                    GRAN MAT x10^3(ANC) 

(test code = 

4788191200)     3.83 10*3/uL    1.88-7.09                       

 

                                                    IMM GRAN x10^3 (test 

code = 6943416414) 0.03 10*3/uL    0-0.06                          

 

                                                    LYMPH x10^3 (test code 

= 731-0)        1.41 10*3/uL    1.32-3.29                       

 

                                                    MONO x10^3 (test code 

= 742-7)        0.47 10*3/uL    0.33-0.92                       

 

                                                    EOS x10^3 (test code = 

711-2)          0.05 10*3/uL    0.03-0.39                       

 

                                                    BASO x10^3 (test code 

= 704-7)        <0.03           0.01-0.07                       

 

                                                    Lab Interpretation 

(test code = 03372-3) Abnormal                                        





[facility]

## 2025-01-26 NOTE — RAD REPORT
EXAMINATION: Ankle Left 3 View



CLINICAL INDICATION: Female, 29 years old. PAIN



COMPARISON: No prior exam.



FINDINGS: 

No acute fracture.

No malalignment/dislocation.

No significant focal degenerative change.

Other: n/a



IMPRESSION:

No acute osseous abnormality. 



Reported By: Moisés Padron 

Electronically Signed:  1/26/2025 10:30 PM

## 2025-01-26 NOTE — ER
Nurse's Notes                                                                                     

 [facility]                                                                 

Name: Lorena Rice                                                                                

Age: 29 yrs                                                                                       

Sex: Female                                                                                       

: 1995                                                                                   

MRN: [id number]                                                                                   

Arrival Date: 2025                                                                          

Time: 21:23                                                                                       

Account#: [id number]                                                                            

Bed IW3                                                                                           

Private MD:                                                                                       

Diagnosis: Pain in left ankle and joints of left foot                                             

                                                                                                  

Presentation:                                                                                     

                                                                                             

21:45 Chief complaint: Patient states: slipped and fell at work between 2 and 3 am today and  me1 

      twisted her left ankle. Pain 8/10. Coronavirus screen: Vaccine status: Patient reports      

      receiving the 2nd dose of the covid vaccine. Ebola Screen: No symptoms or risks             

      identified at this time. Initial Sepsis Screen: Does the patient meet any 2 criteria?       

      No. Patient's initial sepsis screen is negative. Does the patient have a suspected          

      source of infection? No. Patient's initial sepsis screen is negative. Risk Assessment:      

      Do you want to hurt yourself or someone else? Patient reports no desire to harm self or     

      others. Onset of symptoms was 2025 at 02:00.                                    

21:45 Method Of Arrival: Wheelchair                                                           me1 

21:45 Acuity: VANESSA 4                                                                           me1 

                                                                                                  

OB/GYN:                                                                                           

21:48 LMP 1/15/2025, Pregnancy unknown                                                        me1 

                                                                                                  

Historical:                                                                                       

- Allergies:                                                                                      

21:47 No Known Allergies;                                                                     me1 

- PMHx:                                                                                           

21:47 Anemia;                                                                                 me1 

- PSHx:                                                                                           

21:47  section; tubal ligation; Cholecystectomy;                                      me1 

                                                                                                  

- Immunization history:: Adult Immunizations up to date.                                          

- Infectious Disease History:: Denies.                                                            

- Social history:: Smoking status: Reported history of juuling and/or vaping.                     

                                                                                                  

                                                                                                  

Screenin:45 [facility] ED Fall Risk Assessment (Adult) History of falling in the last 3 months,       me1 

      including since admission No falls in past 3 months (0 pts) Confusion or Disorientation     

      No (0 pts) Intoxicated or Sedated No (0 pts) Impaired Gait Yes (1 pt) Mobility Assist       

      Device Used Yes (1 pt) Altered Elimination No (0 pt) Score/Fall Risk Level 0 - 2 = Low      

      Risk Maintained a safe environment, Provided non-skid footwear, Hourly rounding (assess     

      needs \T\ fall precautionary measures) done. Abuse screen: Denies threats or abuse.         

      Nutritional screening: No deficits noted. Tuberculosis screening: No symptoms or risk       

      factors identified.                                                                         

                                                                                                  

Assessment:                                                                                       

21:45 General: Appears uncomfortable, obese, well groomed, well developed, Behavior is calm,  me1 

      cooperative, appropriate for age, Reports slipped and fell at work between 2 and 3 am       

      today and twisted her left ankle. Pain 8/10. Pain: Complains of pain in anterior aspect     

      of left ankle Pain does not radiate. Pain currently is 7 out of 10 on a pain scale.         

      Quality of pain is described as aching, Pain began suddenly, Is continuous. Neuro:          

      Level of Consciousness is awake, alert, obeys commands, Oriented to person, place,          

      time, situation, Appropriate for age. Cardiovascular: Patient's skin is warm and dry.       

      Respiratory: Airway is patent Trachea midline Respiratory effort is even, unlabored,        

      Respiratory pattern is regular, symmetrical. GI: No signs and/or symptoms were reported     

      involving the gastrointestinal system. : No signs and/or symptoms were reported           

      regarding the genitourinary system. EENT: No signs and/or symptoms were reported            

      regarding the EENT system. Derm: Skin is intact, is healthy with good turgor, Skin is       

      pink, warm \T\ dry. Musculoskeletal: Reports pain in left lateral ankle. Injury             

      Description: slipped and fell at work between 2 and 3 am today and twisted her left         

      ankle. Pain 8/10.                                                                           

                                                                                                  

Vital Signs:                                                                                      

21:45  / 76; Pulse 77; Resp 18; Temp 97.6; Pulse Ox 100% ; Weight 105.23 kg; Height 5   me1 

      ft. 8 in. ; Pain 8/10;                                                                      

23:09  / 71; Pulse 72; Resp 16; Temp 98.5; Pulse Ox 100% ;                              me1 

23:09 Pain 7/10;                                                                              me1 

21:45 Body Mass Index 35.28 (105.23 kg, 172.72 cm)                                            me1 

21:45 Pain Scale: Adult                                                                       me1 

23:09 Pain Scale: Adult                                                                       me1 

                                                                                                  

ED Course:                                                                                        

21:27 Patient arrived in ED.                                                                  im  

21:30 Burton Grigsby DO is Attending Physician.                                                ms3 

21:45 Patient has correct armband on for positive identification. Bed in low position. Call   me1 

      light in reach. Side rails up X 1. Provided Education on: POC. Verbalized                   

      understanding..                                                                             

21:45 No provider procedures requiring assistance completed. Patient did not have IV access   me1 

      during this emergency room visit.                                                           

21:47 Triage completed.                                                                       me1 

21:48 Arm band placed on Patient placed in waiting room.                                      me1 

22:30 Ankle Left 3 View XRAY In Process Unspecified.                                          EDMS

22:56 Tomás Reid MD is Referral Physician.                                                ms3 

23:05 Iris Sandy, RN is Primary Nurse.                                                me1 

23:15 Iris Sandy, RN is Primary Nurse.                                                me1 

                                                                                                  

Administered Medications:                                                                         

21:51 Drug: Ibuprofen  mg PO once Route: PO;                                            me1 

23:09 Follow up: Pain 7/10 Adult; Response: No adverse reaction; Pain is decreased            me1 

                                                                                                  

                                                                                                  

Medication:                                                                                       

21:45 VIS not applicable for this client.                                                     me1 

                                                                                                  

Outcome:                                                                                          

22:56 Discharge ordered by MD.                                                                ms3 

23:18 Discharged to home ambulatory, with crutches, with family,                              me1 

23:18 Condition: stable                                                                           

23:18 Discharge instructions given to patient, Instructed on discharge instructions, follow       

      up and referral plans. medication usage, crutch walking, Demonstrated understanding of      

      instructions, follow-up care, medications, crutch walking, Prescriptions given X 1,         

23:19 Patient left the ED.                                                                    me1 

                                                                                                  

Signatures:                                                                                       

Dispatcher [employer]                           EDMS                                                 

Burton Grigsby DO                        DO   ms3                                                  

Amalia Aranda                                                                                  

Iris Sandy, RN                  RN   me1                                                  

                                                                                                  

Corrections: (The following items were deleted from the chart)                                    

23:06 21:45 Chief complaint: Patient states: slipped and fell at work between 2 and 3 am      me1 

      today and twisted her left ankle. Pain 8/10. me1                                            

                                                                                                  

**************************************************************************************************

## 2025-01-27 VITALS — TEMPERATURE: 98.5 F | DIASTOLIC BLOOD PRESSURE: 71 MMHG | SYSTOLIC BLOOD PRESSURE: 124 MMHG

## 2025-01-27 VITALS — OXYGEN SATURATION: 100 %

## 2025-02-13 ENCOUNTER — HOSPITAL ENCOUNTER (EMERGENCY)
Dept: HOSPITAL 97 - ER | Age: 30
Discharge: HOME | End: 2025-02-13
Payer: SELF-PAY

## 2025-02-13 DIAGNOSIS — B34.9: Primary | ICD-10-CM

## 2025-02-13 DIAGNOSIS — Z11.52: ICD-10-CM

## 2025-02-13 LAB
SARS-COV+SARS-COV-2 AG RESP QL IA.RAPID: (no result)
SQUAMOUS URNS QL MICRO: <5 /HPF
UA COMPLETE W REFLEX CULTURE PNL UR: (no result)
UA COMPLETE W REFLEX CULTURE PNL UR: (no result)

## 2025-02-13 PROCEDURE — 81025 URINE PREGNANCY TEST: CPT

## 2025-02-13 PROCEDURE — 87070 CULTURE OTHR SPECIMN AEROBIC: CPT

## 2025-02-13 PROCEDURE — 87804 INFLUENZA ASSAY W/OPTIC: CPT

## 2025-02-13 PROCEDURE — 36415 COLL VENOUS BLD VENIPUNCTURE: CPT

## 2025-02-13 PROCEDURE — 71046 X-RAY EXAM CHEST 2 VIEWS: CPT

## 2025-02-13 PROCEDURE — 99283 EMERGENCY DEPT VISIT LOW MDM: CPT

## 2025-02-13 PROCEDURE — 81001 URINALYSIS AUTO W/SCOPE: CPT

## 2025-02-13 PROCEDURE — 87811 SARS-COV-2 COVID19 W/OPTIC: CPT

## 2025-02-13 PROCEDURE — 87081 CULTURE SCREEN ONLY: CPT

## 2025-02-13 NOTE — XMS REPORT
Continuity of Care Document



                          Created on: 2025





MARIELA REDD

External Reference #: [id number]

: 1995

Sex: Female



Demographics





                                        Address             [address]

[address]

[place]  [address]

 

                                        Home Phone          [phone]

 

                                        Mobile Phone        [phone] )

 

                                        Email Address       [email or web address]

[email or web address]

 

                                        Preferred Language  English

 

                                        Marital Status      Unknown

 

                                        Scientology Affiliation Unknown

 

                                        Race                Unknown

 

                                        Additional Race(s)  Unavailable

Black or 

 

                                        Ethnic Group        Unknown





Author





                                        Name                Unknown

 

                                        Address             [address]

[place]  [address]

 

                                        [facility] [place]

[facility]

 

                                        Address             [address]

[address]

[place]  [address]

 

                                        Phone               [phone]





Support





                          Name         Relationship Address      Phone

 

                                HI VALDES               [address] 



[place]  [address]                     Unavailable

 

                                DEREK ROBLES G               [address]

[address]

[place]  [address]                     Unavailable

 

                                Derek Arthur Sibling         [address]

[address]

[place]  [address]                     [phone]

 

                                M Hi Valdes Mother          [address]

[place]  [address]                     [phone]





Care Team Providers





                                Care Team Member Name Role            Phone

 

                                Pcp, Patient Does Not Have A Primary Care Physic

liliana [phone]

 

                                AMANDO NARVAEZ Attending Clinician Unavailable

 

                                Amando Narvaez DO Attending Clinician [phone]

[phone]

 

                                SLOAN NELSON Attending Clinician Unavailable

 

                                Sloan Nelson MD Attending Clinician [phone]



 

                                PORSCHE POWER Attending Clinician Unavailable

 

                                PORSCHE Rodriges Attending Clinician [phone]



 

                                Doctor Unassigned, No Name Attending Clinician U

Tom Ramos MD Attending Clinician [phone]

[phone]

 

                                CYNTHIA FITCH Attending Clinician Unavailable

 

                                Cynthia Cardenas Attending Clinician [phone]

 

                                BHARTI FUENTES Attending Clinician Unavailable

 

                                Bharti Fuentes MD Attending Clinician [phone]

[phone]

 

                                Fabienne Steele RN Attending Clinician Unavailable

 

                                Ibikunle FNP, Folusho F Attending Clinician [phone]

[phone]

 

                                SABINA AVILEZ Attending Clinician Unavaildipti Pulido MD, Jasmin KERR Attending Clinician 

[phone]

 

                                [facility] Attending Clinician Prem De Luna MD Attending Clinician [phone]

 

                                Karen EMERSON, Katy Young Attending Clinician

 [phone]

 

                                Rahul FNP, Crystal ZAMORA Attending Clinician [phone]

[phone]

 

                                [facility] Saul Cho WHCNP, Harleen C Attending Clinician +

[phone]

 

                                Wilton EMERSON, Shanell Attending Clinician [phone]

 

                                Tommy EMERSON, Carlos Eduardo Attending Clinician [phone]

 

                                AMANDO NARVAEZ Admitting Clinician Unavailable

 

                                Tom Bustillo MD Admitting Clinician [phone]

 

                                CYNTHIA FITCH Admitting Clinician Unavailable

 

                                BHARTI FUENTES Admitting Clinician Unavailable

 

                                Prem Batista MD Admitting Clinician [phone]

 

                                Wilton EMERSON, Shanell Admitting Clinician [phone]







Payers





                    Payer Name Policy Type Policy Number Effective Date Expirati

on Date Source

 

                                                    [facility]                                  [id number]           2019 

00:00:00                                            







Problems





                                                    Condition 

Name                                    Condition 

Details                                 Condition 

Category                  Status                    Onset 

Date                                    Resolution 

Date                                    Last 

Treatment 

Date                                    Treating 

Clinician                 Comments                  Source

 

                                                    Request 

for 

sterilizat

ion                                     Request 

for 

sterilizat

ion                 Disease             Active              2019-0

8-15 

00:00:

00                                                              Overview: 

Formattin

g of this 

note 

might be 

different 

from the 

original.

Added 

automatic

ally from 

request 

for 

surgery 

[id number]                                  [facility]

 

                                                    Postpartum 

care and 

examinatio

n 

immediatel

y after 

delivery                                Postpartum 

care and 

examinatio

n 

immediatel

y after 

delivery            Disease             Active              14 

00:00:

00                                                               [facility]

 

                                                    38 weeks 

gestation 

of 

pregnancy                               38 weeks 

gestation 

of 

pregnancy           Disease             Active               

00:00:

00                                                               [facility]

 

                                                    S/P 

 

section                                 S/P 

 

section             Disease             Active               

00:00:

00                                                               [facility]

 

                                                    APH 

(antepartu

m 

hemorrhage

), third 

trimester                               [facility] 

(antepartu

m 

hemorrhage

), third 

trimester           Disease             Active               

00:00:

00                                                               [facility]

 

                                                    Decreased 

fetal 

movements, 

third 

trimester, 

not 

applicable 

or 

unspecifie

d                                       Decreased 

fetal 

movements, 

third 

trimester, 

not 

applicable 

or 

unspecifie

d                   Disease             Active               

00:00:

00                                                               [facility]

 

                                                    Susceptibl

e to 

Varicella 

(non-immun

e), 

currently 

pregnant 

in third 

trimester                               Susceptibl

e to 

Varicella 

(non-immun

e), 

currently 

pregnant 

in third 

trimester           Disease             Active               

00:00:

00                                                               [facility]

 

                                                    BMI 

39.0-39.9,

adult                                   BMI 

39.0-39.9,

adult               Disease             Active               

00:00:

00                                                               [facility]

 

                                                    Limited 

prenatal 

care in 

third 

trimester                               Limited 

prenatal 

care in 

third 

trimester           Disease             Active               

00:00:

00                                                               [facility]

 

                                                    Multiparit

y                                       Multiparit

y                   Disease             Active               

00:00:

00                                                               [facility]

 

                                                    Tubal 

ligation 

status                                  Tubal 

ligation 

status              Disease             Active               

00:00:

00                                                               [facility]

 

                                                    Cessation 

of tobacco 

use in 

previous 

12 months                               Cessation 

of tobacco 

use in 

previous 

12 months           Disease             Active               

00:00:

00                                                               [facility]

 

                                                    Tubal 

ligation 

status                                  Tubal 

ligation 

status              Disease             Active               

00:00:

00                                                               [facility]

 

                                                    History of 

 

delivery, 

currently 

pregnant 

in third 

trimester                               History of 

 

delivery, 

currently 

pregnant 

in third 

trimester           Disease             Active               

00:00:

00                                                               [facility]

 

                                                    Obesity 

affecting 

pregnancy 

in third 

trimester                               Obesity 

affecting 

pregnancy 

in third 

trimester           Disease             Active               

00:00:

00                                                               [facility]

 

                                                    Anemia of 

mother in 

pregnancy, 

antepartum                              Anemia of 

mother in 

pregnancy, 

antepartum          Disease             Active               

00:00:

00                                                               [facility]

 

                                                    Anemia of 

mother in 

pregnancy, 

antepartum                              Anemia of 

mother in 

pregnancy, 

antepartum          Disease             Active               

00:00:

00                                                               [facility]







Allergies, Adverse Reactions, Alerts





                                                    Allergy 

Name                                    Allergy 

Type            Status          Severity        Reaction(s)     Onset 

Date                                    Inactive 

Date                                    Treating 

Clinician                 Comments                  Source

 

                                                    NO KNOWN 

ALLERGIE

S                                       Drug 

Class   Active                                                  [facility]







Social History





                    Social Habit Start Date Stop Date Quantity  Comments  Source

 

                                        ASSERTION           2018-11-10 

00:00:00                        Pregnant                        [facility]

 

                    Sexual orientation                                         U

[facility]

 

                                                    Exposure to 

SARS-CoV-2 (event)                      2022-04-15 

00:00:00                                2022 

13:31:00            Not sure                                [facility]

 

                                        Alcohol intake      2021 

00:00:00                                2021 

00:00:00                                Current 

non-drinker of 

alcohol 

(finding)                                           [facility]

 

                                                    History of Social 

function                                2020 

00:00:00                                2020 

00:00:00                                                    [facility]

 

                                                    Tobacco use and 

exposure                                2019 

00:00:00                                2019 

00:00:00                                Smokeless 

tobacco non-user                                    [facility]

 

                                        Education           2019 

00:00:00                                2019 

00:00:00            13                                      [facility]

 

                                                    History of tobacco 

use                                                 2018 

00:00:00            Cigarette Smoker                        [facility]

 

                                                    Sex Assigned At 

Birth                                   1995 

00:00:00                                1995 

00:00:00                                                    [facility]







                          Smoking Status Start Date   Stop Date    Source

 

                          Ex-smoker    2019 00:00:00 2019 00:00:00 U

[facility]

 

                          Unknown if ever smoked                           [facility]







Medications





                                                    Ordered 

Medication 

Name                                    Filled 

Medication 

Name                                    Start 

Date                                    Stop 

Date                                    Current 

Medication?                             Ordering 

Clinician       Indication      Dosage          Frequency       Signature 

(SIG)               Comments            Components          Source

 

                                                    HYDROcodone

-acetaminop

hen (NORCO) 

 mg 

tablet 1 

tablet                                               

00:00:

00                                       

22:51

:00        No                               1{tbl}                1 tablet, 

Oral, 

ONCE, 1 

dose, On 

22 at 

1900, 

Routine                                                     [facility]

 

                                                    ketorolac 

10 mg 

tablet                                               

00:00:

00                              Yes                             [id number]

[id number]                10mg                                    Take 1 

tablet by 

mouth 

every 6 

(six) 

hours as 

needed for 

Pain 

(scale 

7-10).                                                      [facility]

 

                                                    metroNIDAZO

 mg 

tablet                                               

00:00:

00                  Yes                 [id number] 500mg               Take 1 

tablet by 

mouth 2 

(two) 

times 

daily.                                                      [facility]

 

                                                    ibuprofen 

800 mg 

tablet                                               

00:00:

00                  Yes                 [id number] 800mg               Take 1 

tablet by 

mouth 

every 8 

(eight) 

hours as 

needed for 

Pain 

(scale 

4-6).                                                       [facility]

 

                                                    methocarbam

oL 

(ROBAXIN) 

500 mg 

tablet                                               

00:00:

00                  Yes                 [id number] 500mg               Take 1 

tablet by 

mouth 

every 6 

(six) 

hours as 

needed for 

Pain 

(scale 

7-10) 

(MUSCLE 

SPASM).                                                     [facility]

 

                                                    acetaminoph

en 

(TYLENOL) 

tablet 

1,000 mg                                            2021 

05:30:

00                                       

04:31

:00        No                               1000mg                1,000 mg, 

Oral, 

ONCE, 1 

dose, On 

Sat 

21 

at 2330, 

ASAP                                                        [facility]

 

                                                    ibuprofen 

(IBU) 

tablet 800 

mg                                                  2021 

04:30:

00                                       

03:32

:00        No                               800mg                 800 mg, 

Oral, 

ONCE, 1 

dose, On 

Sat 

21 

at 2230, 

ASAP                                                        [facility]

 

                                                    benzonatate 

100 mg 

capsule                                             2021 

00:00:

00                  Yes                 [id number] 100mg               Take 1 

capsule by 

mouth 3 

(three) 

times 

daily as 

needed for 

Cough.                                                      [facility]

 

                                                    ondansetron 

(ZOFRAN 

ODT) 4 mg 

disintegrat

ing tablet                                          2021 

00:00:

00                  Yes                 [id number] 4mg                 Take 1 

tablet by 

mouth 

every 8 

(eight) 

hours as 

needed for 

Nausea and 

Vomiting 

(N/V).                                                      [facility]

 

                                                    ibuprofen 

600 mg 

tablet                                              2021 

00:00:

00                                       

00:00

:00        No                    [id number]  600mg                 Take 1 

tablet by 

mouth 

every 6 

(six) 

hours as 

needed for 

Pain 

(scale 

4-6).                                                       [facility]

 

                                                    fluconazole 

150 mg 

tablet                                              2021 

00:00:

 

05:59

:00        No                    [id number]    150mg                 Take 1 

tablet by 

mouth once 

now for 1 

dose.                                                       [facility]

 

                                                    ibuprofen 

(IBU) 

tablet 400 

mg                                                  2021 

09:15:

00                                       

08:38

:00        No                               400mg                 400 mg, 

Oral, 

ONCE, 1 

dose, On 

Sat 

21 

at 0315, 

Routine                                                     [facility]

 

                                                    lidocaine-e

pinephrine 

(XYLOCAINE 

WITH 

EPINEPHRINE

) 1 

%-1:100,000 

injection 

10 mL                                               2021 

08:15:

00                                       

08:38

:00        No                               10mL                  10 mL, 

Intraderma

l, ONCE, 1 

dose, On 

Sat 

21 

at 0215, 

Routine                                                     [facility]

 

                                                    maalox:diph

enhydrAMINE

:lidocaine 

2 % viscous 

1:1:1 

(FIRST-MOUT

HWASH BLM) 

oral 

suspension 

15 mL                                               2021 

04:30:

00                                       

03:40

:00        No                               15mL                  15 mL, 

Oral, 

ONCE, 1 

dose, On 

21 

at 2230, 

Routine                                                     [facility]

 

                                                    dexamethaso

ne 

(DECADRON 

PHOSPHATE) 

injection 

10 mg                                               2021 

04:30:

00                                       

03:39

:00        No                               10mg                  10 mg, IV 

Push, 

ONCE, 1 

dose, On 

21 

at 2230, 

STAT                                                        [facility]

 

                                                    iopamidol 

(ISOVUE 

370-500 mL) 

injection 

100 mL                                              2021 

03:50:

00                                       

03:51

:00        No                    [id number]  100mL                 100 mL, 

Intravenou

s, ONCE, 1 

dose, On 

21 

at 2200, 

Routine                                                     [facility]

 

                                                    methylPREDN

ISolone 4 

mg tablets                                          2021 

00:00:

00                  Yes                 [id number]                      Take by 

mouth 

SEE-INSTRU

CTIONS. 

follow 

package 

directions                                                  [facility]

 

                                                    amoxicillin

-clavulanat

e 

(AUGMENTIN) 

875-125 mg 

per tablet                                          2021 

00:00:

00                                       

05:59

:00        No                    [id number]   1{tbl}                Take 1 

tablet by 

mouth 2 

(two) 

times 

daily for 

10 days.                                                    [facility]

 

                                                    lidocaine 

2% viscous 

(LIDOCAINE 

VISCOUS) 2 

% solution 

15 mL                                               2021 

18:45:

00                                       

17:46

:00        No                               15mL                  15 mL, 

Oral, 

ONCE, 1 

dose, On 

21 

at 1245, 

ASAP                                                        [facility]

 

                                                    dexamethaso

ne 

(DECADRON 

PHOSPHATE) 

injection 

10 mg                                               2021 

18:45:

00                                       

17:46

:00        No                               10mg                  10 mg, 

Oral, 

ONCE, 1 

dose, On 

21 

at 1245, 

Routine                                                     [facility]

 

                                                    penicillin 

g 

benzathine 

(BICILLIN 

L-A) 

injection 

1.2 Million 

Units                                               2021 

18:45:

00                                       

17:45

:00        No                               1.210                 1.2 

Million 

Units, 

Intramuscu

lar, ONCE, 

1 dose, On 

21 

at 1245, 

ASAP<br>Re

ason for 

Anti-Infec

tive: 

Empiric 

Therapy 

for 

Suspected 

Infection<

br>Empiric 

Therapy 

Site: 

HEENT<br>D

uration of 

therapy: 

72 hours                                                    [facility]

 

                                                    iopamidol 

(ISOVUE 

370-500 mL) 

injection 

100 mL                                              2021 

19:00:

00                                      2021-

10-29 

17:40

:00        No                    [id number]   100mL                 100 mL, 

Intravenou

s, ONCE, 1 

dose, On 

Fri 

10/29/21 

at 1400, 

Routine                                                     [facility]

 

                                                    FENTanyl PF 

(SUBLIMAZE 

(PF)) 

injection 

100 mcg                                             2021 

18:30:

00                                      2021-

10-29 

17:52

:00        No                               100ug                 100 mcg, 

Slow IV 

Push, 

ONCE, 1 

dose, On 

Fri 

10/29/21 

at 1330, 

STAT                                                        [facility]

 

                                                    dicyclomine 

20 mg 

tablet                                              2021 

00:00:

00                  Yes                 [id number] 20mg                Take 1 

tablet by 

mouth 4 

(four) 

times 

daily.                                                      [facility]

 

                                                    ibuprofen 

600 mg 

tablet                                              2021 

00:00:

00                                       

00:00

:00        No                    [id number]  600mg                 Take 1 

tablet by 

mouth 

every 8 

(eight) 

hours as 

needed for 

Pain 

(scale 

4-6).                                                       [facility]

 

                                                    ibuprofen 

(IBU) 

tablet 800 

mg                                                  2020 

00:15:

00                                       

23:30

:00        No                               800mg                 800 mg, 

Oral, 

ONCE, 1 

dose, Sat 

20 

at 1815, 

ASAP                                                        [facility]

 

                                                    lidocaine 

2% viscous 

(LIDOCAINE 

VISCOUS) 2 

% solution 

10 mL                                               2020 

00:15:

00                                       

23:30

:00        No                               10mL                  10 mL, 

Oral, 

ONCE, 1 

dose, Sat 

20 

at 1815, 

ASAP                                                        [facility]

 

                                                    dexamethaso

ne 

(DECADRON 

PHOSPHATE) 

injection 

10 mg                                               2020 

00:15:

00                                       

23:30

:00        No                               10mg                  10 mg, 

Intramuscu

lar, ONCE, 

1 dose, 

Sat 

20 

at 1815, 

STAT                                                        [facility]

 

                                                    No known 

medications                                         2020 

18:14:

13            No                                                      [facility]

 

                                                    cephALEXin 

500 mg 

tablet                                              2020 

00:00:

00                                       

05:59

:00        No                    [id number]   500mg                 Take 1 

tablet by 

mouth 2 

(two) 

times 

daily for 

10 days.                                                    [facility]

 

                                                    lactated 

ringers IV 

infusion 

1,000 mL                                             

14:15:

00                  Yes                           1000mL              at 75 

mL/hr, 

1,000 mL, 

IV 

Infusion, 

CONTINUOUS

, Starting 

Thu 19 

at 0915, 

Until 

Discontinu

ed, 

Routine, 

PACU                                                        [facility]

 

                                                    HYDROcodone

-acetaminop

hen (NORCO 

5) 5-325 mg 

tablet 1 

tablet                                               

14:13:

31                  Yes                           1{tbl}              1 tablet, 

Oral, PRN, 

1 dose, 

Starting 

Thu 19 

at 0913, 

Until 

Discontinu

ed, 

Routine, 

Pain 

(scale 

7-10), DSU 

Recovery                                                    [facility]

 

                                                    ibuprofen 

(IBU) 

tablet 800 

mg                                                   

14:13:

31                  Yes                           800mg               800 mg, 

Oral, PRN, 

1 dose, 

Starting 

Thu 19 

at 0913, 

Until 

Discontinu

ed, 

Routine, 

Pain 

(scale 

1-3), Pain 

(scale 

4-6), DSU 

Recovery                                                    [facility]

 

                                                    HYDROmorpho

ne 

(DILAUDID) 

injection 

0.2 mg                                               

14:13:

20                  Yes                           .2mg                0.2 mg, 

Slow IV 

Push, 

Q5MIN PRN, 

10 doses, 

Starting 

u 19 

at 0913, 

Until 

Discontinu

ed, 

Routine, 

Pain 

(scale 

7-10), 

PACU<br>Us

e approved 

by 

(Faculty): 

[employer] ESC[employer], 

PAIN 

[employer]ICE                                                     [facility]

 

                                                    FENTanyl PF 

(SUBLIMAZE 

(PF)) 

injection 

25 mcg                                               

14:13:

20                  Yes                           25ug                25 mcg, 

Slow IV 

Push, 

Q5MIN PRN, 

4 doses, 

Starting 

u 19 

at 0913, 

Until 

Discontinu

ed, 

Routine, 

Pain 

(scale 

4-6), PACU                                                  [facility]

 

                                                    ondansetron 

(ZOFRAN 

(PF)) 

injection 4 

mg                                                   

14:13:

20                  Yes                           4mg                 4 mg, Slow

 

IV Push, 

PRN, 1 

dose, 

Starting 

u 19 

at 0913, 

Until 

Discontinu

ed, 

Routine, 

Nausea and 

Vomiting 

(N/V), 

PACU                                                        [facility]

 

                                                    bupivacaine 

(preserv 

free) 

(SENSORCAIN

E MPF) 0.25 

% (2.5 

mg/mL) 

injection                                            

13:56:

00                  Yes                                               PRN, 

Starting 

u 19 

at 0856, 

Until 

Discontinu

ed, 

Routine, 

Intra-op                                                    [facility]

 

                                                    ibuprofen 

800 mg 

tablet                                               

00:00:

00                  Yes                 [id number] 800mg               Take 1 

tablet by 

mouth 

every 6 

(six) 

hours as 

needed for 

Pain 

(scale 

4-6).                                                       [facility]

 

                                                    HYDROcodone

-acetaminop

hen 5-325 

mg tablet                                            

00:00:

00                              Yes                             [id number]

108                 1{tbl}                                  Take 1 

tablet by 

mouth 

every 6 

(six) 

hours as 

needed for 

Pain 

(scale 

7-10).                                                      [facility]

 

                                                    ibuprofen 

800 mg 

tablet                                              2019-0

8-15 

00:00:

00                                       

00:00

:00                 No                                      [id number]

108                 800mg                                   Take 1 

tablet by 

mouth 

every 6 

(six) 

hours as 

needed for 

Pain 

(scale 

4-6).                                                       [facility]

 

                                                    HYDROcodone

-acetaminop

hen 5-325 

mg tablet                                           2019-0

8-15 

00:00:

00                                       

00:00

:00                 No                                      [id number]

108                 1{tbl}                                  Take 1 

tablet by 

mouth 

every 6 

(six) 

hours as 

needed for 

Pain 

(scale 

7-10).                                                      [facility]

 

                                                    ascorbic 

acid, 

vitamin C, 

(VITAMIN C) 

500 mg 

tablet                                               

00:00:

00                                       

00:00

:00        No                    [id number]  500mg                 Take 1 

tablet by 

mouth 3 

(three) 

times 

daily.                                                      [facility]

 

                                                    docusate 

calcium 240 

mg capsule                                           

00:00:

00                                      2019-

08-15 

00:00

:00        No                    [id number]  240mg                 Take 1 

capsule by 

mouth once 

daily as 

needed for 

Constipati

on. May 

substitute 

for what 

is in 

stock and 

covered by 

patient 

plan                                                        [facility]

 

                                                    ferrous 

sulfate 325 

mg (65 mg 

iron) 

tablet                                               

00:00:

00                                      2019-

08-15 

00:00

:00        No                    [id number]  325mg                 Take 1 

tablet by 

mouth 3 

(three) 

times 

daily with 

meals. May 

substitute 

for what 

is in 

stock and 

covered by 

patient 

plan                                                        [facility]

 

                                                    ibuprofen 

600 mg 

tablet                                               

00:00:

00                                      2019-

08-15 

00:00

:00        No                    [id number]  600mg                 Take 1 

tablet by 

mouth 

every 6 

(six) 

hours as 

needed for 

Pain 

(scale 

1-3) or 

Pain 

(scale 

4-6) 

(Pain). 

Take with 

food or 

milk.                                                       [facility]

 

                                                    foLIC acid 

1 mg tablet                                          

00:00:

00                                      2019-

08-15 

00:00

:00        No                    [id number]  1mg                   Take 1 

tablet by 

mouth 

daily.                                                      [facility]

 

                                                    HYDROcodone

-acetaminop

hen 5-325 

mg tablet                                            

00:00:

00                                      2019-

08-15 

00:00

:00        No                    [id number]  1{tbl}                Take 1 

tablet by 

mouth 

every 6 

(six) 

hours as 

needed for 

Pain 

(scale 

7-10).                                                      [facility]

 

                                                    human 

papillomav 

vac,9-carmelo(P

F) 

(GARDASIL 9 

(PF)) vial 

0.5 mL                                               

11:42:

18                                       

18:03

:00        No                               .5mL                  0.5 mL, 

Intramuscu

lar, 

ONCE-PRIOR 

TO 

DISCHARGE, 

1 dose, 

Starting 

19 at 

0642, 

Until 

Discontinu

ed, 

Routine, 

Give 

vaccine 

prior to 

discharge                                                   [facility]

 

                                                    varicella 

virus 

vaccine 

live 

(VARIVAX 

(PF)) 

injection 

0.5 mL                                               

11:42:

08                                       

19:25

:00        No                               .5mL                  0.5 mL, 

Subcutaneo

us, 

ONCE-PRIOR 

TO 

DISCHARGE, 

1 dose, 

Starting 

19 at 

0642, 

Until 

Discontinu

ed, 

Routine, 

Give 

vaccine 

prior to 

discharge                                                   [facility]

 

                                                    HYDROcodone

-acetaminop

hen (NORCO 

5) 5-325 mg 

tablet 2 

tablet                                               

05:14:

00                  Yes                           2{tbl}              2 tablet, 

Oral, 

Q6HPRN, 

Starting 

19 at 

0014, 

Until 

Discontinu

ed, 

Routine, 

Pain 

(scale 

7-10), If 

uncontroll

ed by 

Ibuprofen                                                   [facility]

 

                                                    HYDROcodone

-acetaminop

hen (NORCO 

5) 5-325 mg 

tablet 1 

tablet                                               

05:14:

00                  Yes                           1{tbl}              1 tablet, 

Oral, 

Q6HPRN, 

Starting 

Thu 

19 at 

0014, 

Until 

Discontinu

ed, 

Routine, 

Pain 

(scale 

4-6), If 

uncontroll

ed by 

Ibuprofen                                                   [facility]

 

                                                    ibuprofen 

(IBU) 

tablet 600 

mg                                                   

05:14:

00                  Yes                           600mg               600 mg, 

Oral, 

Q6HPRN, 

Starting 

Thu 

19 at 

0014, 

Until 

Discontinu

ed, 

Routine, 

Pain 

(scale 

1-3)                                                        [facility]

 

                                                    diphenhydrA

MINE 

(BENADRYL) 

tablet 25 

mg                                                   

05:14:

00                  Yes                           25mg                25 mg, 

Oral, 

Q6HPRN, 

Starting 

Thu 

19 at 

0014, 

Until 

Discontinu

ed, 

Routine, 

Sleep, 

Itching                                                     [facility]

 

                                                    ondansetron 

(ZOFRAN 

(PF)) 

injection 4 

mg                                                   

05:14:

00                  Yes                           4mg                 4 mg, Slow

 

IV Push, 

Q8HPRN, 

Starting 

19 at 

0014, 

Until 

Discontinu

ed, 

Routine, 

Nausea and 

Vomiting 

(N/V)                                                       [facility]

 

                                                    bisacodyl 

(DULCOLAX) 

suppository 

10 mg                                                

05:14:

00                  Yes                           10mg                10 mg, 

Rectal, 

QDAILYPRN, 

Starting 

Thu 

19 at 

0014, 

Until 

Discontinu

ed, 

Routine, 

Constipati

on                                                          [facility]

 

                                                    simethicone 

(GAS 

RELIEF) 

chewable 

tablet 160 

mg                                                   

05:14:

00                  Yes                           160mg               160 mg, 

Oral, 

PC+HSPRN, 

Starting 

Thu 

19 at 

0014, 

Until 

Discontinu

ed, 

Routine, 

Gas                                                         [facility]

 

                                                    docusate 

calcium 

(SURFAK) 

capsule 240 

mg                                                   

05:14:

00                  Yes                           240mg               240 mg, 

Oral, 

QDAILYPRN, 

Starting 

Thu 

19 at 

0014, 

Until 

Discontinu

ed, 

Routine, 

Constipati

on                                                          [facility]

 

                                                    magnesium 

hydroxide 

(MILK OF 

MAGNESIA) 

400 mg/5 mL 

suspension 

30 mL                                                

05:14:

00                  Yes                           30mL                30 mL, 

Oral, 

QDAILYPRN, 

Starting 

Thu 

19 at 

0014, 

Until 

Discontinu

ed, 

Routine, 

Constipati

on                                                          [facility]

 

                                                    naloxone 

(NARCAN) 

injection 

0.4 mg                                               

05:13:

52                                       

05:12

:52        No                               .4mg                  0.4 mg, 

Slow IV 

Push, PRN 

- SEE 

INSTRUCTIO

NS, 

Starting 

u 

19 at 

0013, 

Until Sat 

19 at 

0012, 

Routine, 

Analgesia 

Recovery, 

PACU                                                        [facility]

 

                                                    diphenhydrA

MINE 

(BENADRYL) 

injection 

25 mg                                                

05:13:

52                                       

05:12

:52        No                               25mg                  25 mg, 

Slow IV 

Push, 

Q4HPRN, 

Starting 

u 

19 at 

0013, 

Until 19 at 

0012, 

Routine, 

Itching, 

PACU                                                        [facility]

 

                                                    prenatal 

vit 

calc,iron,f

olic 

(PRENATAL 

VITAMIN 

ORAL)                                                

04:10:

56                                       

00:00

:00        No                                                     Take by 

mouth.                                                      [facility]

 

                                                    ferrous 

sulfate 325 

mg (65 mg 

iron) 

tablet                                               

04:10:

56                                       

00:00

:00        No                               325mg                 Take 325 

mg by 

mouth 3 

(three) 

times 

daily with 

meals.                                                      [facility]

 

                                                    azithromyci

n 

(ZITHROMAX) 

500 mg in 

NaCl 0.9% 

(NS) 250 mL 

VIAL-MATE 

IV 

piggyback                                            

01:59:

39                                       

05:07

:00        No                               500mg                 500 mg, IV 

Piggyback, 

O.R. 

HOLDING 

ONCE, 1 

dose, 

Starting 

19 at 

, 

Until 

Discontinu

ed, 250 

mL<br>Reas

on for 

Anti-Infec

tive: 

Surgical 

Prophylaxi

s<br>Surgi

eliza 

Prophylaxi

s: 

OB/GYN<br>

Duration 

of 

therapy: 

within 24 

hours of 

surgery                                                     [facility]

 

                                                    ceFAZolin 

in dextrose 

(iso-os) 

(ANCEF) 2 

gram/100 mL 

Piggyback 2 

g                                                    

01:58:

45                                       

02:08

:00        No                               2000mg                2 g (2,000 

mg), IV 

Piggyback, 

O.R. 

HOLDING 

ONCE, 1 

dose, 

Starting 

19 at 

, 

Until 19 at 

, 100 

mL<br>Reas

on for 

Anti-Infec

tive: 

Surgical 

Prophylaxi

s<br>Surgi

eliza 

Prophylaxi

s: 

OB/GYN<br>

Duration 

of 

therapy: 

within 24 

hours of 

surgery                                                     [facility]

 

                                                    sodium 

citrate-cit

brayan acid 

(BICITRA) 

500-334 

mg/5 mL 

solution 30 

mL                                                   

01:58:

44                                       

02:09

:00        No                               30mL                  30 mL, 

Oral, 

PRE-PROCED

URE ONCE, 

1 dose, 

Starting 

19 at 

, 

Until 19 at 

, 

Routine, 

Surgery                                                     [facility]

 

                                                    LR 1000 mL 

+ oxytocin 

20 units IV 

Solution                                             

15:45:

16                                       

05:14

:04        No                               2mU/min               2 

sally-unit

s/min (6 

mL/hr), at 

6 mL/hr, 

IV 

Infusion, 

TITRATE, 

Starting 

19 at 

1045, 

Until Thu 

19 at 

0014, 

ASAP, 

Oxytocin 

induction.                                                  [facility]

 

                                                    sodium 

citrate-cit

brayan acid 

(BICITRA) 

500-334 

mg/5 mL 

solution 30 

mL                                                   

15:44:

10                                       

00:09

:00        No                               30mL                  30 mL, 

Oral, 

PRE-PROCED

URE ONCE, 

1 dose, 

Starting 

19 at 

1044, 

Until 

Discontinu

ed, 

Routine, 

Surgery/Pr

ocedure                                                     [facility]

 

                                                    lactated 

ringers IV 

infusion 

500 mL                                               

15:44:

10                                       

05:14

:04        No                               500mL                 at 999 

mL/hr, 500 

mL, IV 

Infusion, 

PRN - SEE 

INSTRUCTIO

NS, 

Starting 

19 at 

1044, 

Until Thu 

19 at 

0014, 

Routine                                                     [facility]

 

                                                    No known 

medications                   No                                              Un

kamlesh

[facility]

 

                                                    No known 

medications                   No                                              Un

kamlesh

[facility]







Immunizations





                                                    Ordered 

Immunization Name                       Filled 

Immunization Name Date            Status          Comments        Source

 

                                                    Varicella 

(varivax)(chicken 

pox)                                                2019 

00:00:00            Completed                               [facility]

 

                                HPV9                            2019 

00:00:00            Completed                               [facility]

 

                                                    Varicella 

(varivax)(chicken 

pox)                                                2019 

00:00:00            Completed                               [facility]

 

                                HPV9                            2019 

00:00:00            Completed                               [facility]

 

                                                    Varicella 

(varivax)(chicken 

pox)                                                2019 

00:00:00            Completed                               [facility]

 

                                HPV9                            2019 

00:00:00            Completed                               [facility]

 

                                                    Varicella 

(varivax)(chicken 

pox)                                                2019 

00:00:00            Completed                               [facility]

 

                                HPV9                            2019 

00:00:00            Completed                               [facility]

 

                                                    Varicella 

(varivax)(chicken 

pox)                                                2019 

00:00:00            Completed                               [facility]

 

                                HPV9                            2019 

00:00:00            Completed                               [facility]

 

                                                    Varicella 

(varivax)(chicken 

pox)                                                2019 

00:00:00            Completed                               [facility]

 

                                HPV9                            2019 

00:00:00            Completed                               [facility]

 

                                                    Varicella 

(varivax)(chicken 

pox)                                                2019 

00:00:00            Completed                               [facility]

 

                                HPV9                            2019 

00:00:00            Completed                               [facility]

 

                                                    Varicella 

(varivax)(chicken 

pox)                                                2019 

00:00:00            Completed                               [facility]

 

                                HPV9                            2019 

00:00:00            Completed                               [facility]

 

                                HPV9                            2019 

00:00:00            Completed                               [facility]

 

                                                    Varicella 

(varivax)(chicken 

pox)                                                2019 

00:00:00            Completed                               [facility]

 

                                HPV9                            2019 

00:00:00            Completed                               [facility]

 

                                                    Varicella 

(varivax)(chicken 

pox)                                                2019 

00:00:00            Completed                               [facility]

 

                                HPV9                            2019 

00:00:00            Completed                               [facility]

 

                                                    Varicella 

(varivax)(chicken 

pox)                                                2019 

00:00:00            Completed                               [facility]

 

                                HPV9                            2019 

00:00:00            Completed                               [facility]

 

                                                    Varicella 

(varivax)(chicken 

pox)                                                2019 

00:00:00            Completed                               [facility]

 

                                HPV9                            2019 

00:00:00            Completed                               [facility]

 

                                                    Varicella 

(varivax)(chicken 

pox)                                                2019 

00:00:00            Completed                               [facility]

 

                                HPV9                            2019 

00:00:00            Completed                               [facility]

 

                                                    Varicella 

(varivax)(chicken 

pox)                                                2019 

00:00:00            Completed                               [facility]

 

                                HPV9                            2019 

00:00:00            Completed                               [facility]

 

                                                    Varicella 

(varivax)(chicken 

pox)                                                2019 

00:00:00            Completed                               [facility]

 

                                HPV9                            2019 

00:00:00            Completed                               [facility]

 

                                                    Varicella 

(varivax)(chicken 

pox)                                                2019 

00:00:00            Completed                               [facility]

 

                                HPV9                            2019 

00:00:00            Completed                               [facility]

 

                                                    Varicella 

(varivax)(chicken 

pox)                                                2019 

00:00:00            Completed                               [facility]

 

                                HPV9                            2019 

00:00:00            Completed                               [facility]

 

                                                    Varicella 

(varivax)(chicken 

pox)                                                2019 

00:00:00            Completed                               [facility]

 

                                HPV9                            2019 

00:00:00            Completed                               [facility]

 

                                                    Varicella 

(varivax)(chicken 

pox)                                                2019 

00:00:00            Completed                               [facility]

 

                                HPV9                            2019 

00:00:00            Completed                               [facility]

 

                                                    Varicella 

(varivax)(chicken 

pox)                                                2019 

00:00:00            Completed                               [facility]

 

                                HPV9                            2019 

00:00:00            Completed                               [facility]

 

                                                    Varicella 

(varivax)(chicken 

pox)                                                2019 

00:00:00            Completed                               [facility]

 

                                HPV9                            2019 

00:00:00            Completed                               [facility]

 

                                                    Varicella 

(varivax)(chicken 

pox)                                                2019 

00:00:00            Completed                               [facility]

 

                                HPV9                            2019 

00:00:00            Completed                               [facility]

 

                                                    Varicella 

(varivax)(chicken 

pox)                                                2019 

00:00:00            Completed                               [facility]

 

                                HPV9                            2019 

00:00:00            Completed                               [facility]

 

                                                    Varicella 

(varivax)(chicken 

pox)                                                2019 

00:00:00            Completed                               [facility]

 

                                HPV9                            2019 

00:00:00            Completed                               [facility]

 

                                                    TDAP (ADACEL) 

VACCINE                                             2019 

00:00:00            Completed                               [facility]

 

                                                    TDAP (ADACEL) 

VACCINE                                             2019 

00:00:00            Completed                               [facility]

 

                                                    TDAP (ADACEL) 

VACCINE                                             2019 

00:00:00            Completed                               [facility]

 

                                                    TDAP (ADACEL) 

VACCINE                                             2019 

00:00:00            Completed                               [facility]

 

                                                    TDAP (ADACEL) 

VACCINE                                             2019 

00:00:00            Completed                               [facility]

 

                                                    TDAP (ADACEL) 

VACCINE                                             2019 

00:00:00            Completed                               [facility]

 

                                                    TDAP (ADACEL) 

VACCINE                                             2019 

00:00:00            Completed                               [facility]

 

                                                    TDAP (ADACEL) 

VACCINE                                             2019 

00:00:00            Completed                               [facility]

 

                                                    TDAP (ADACEL) 

VACCINE                                             2019 

00:00:00            Completed                               [facility]

 

                                                    TDAP (ADACEL) 

VACCINE                                             2019 

00:00:00            Completed                               [facility]

 

                                                    TDAP (ADACEL) 

VACCINE                                             2019 

00:00:00            Completed                               [facility]

 

                                                    TDAP (ADACEL) 

VACCINE                                             2019 

00:00:00            Completed                               [facility]

 

                                                    TDAP (ADACEL) 

VACCINE                                             2019 

00:00:00            Completed                               [facility]

 

                                                    TDAP (ADACEL) 

VACCINE                                             2019 

00:00:00            Completed                               [facility]

 

                                                    TDAP (ADACEL) 

VACCINE                                             2019 

00:00:00            Completed                               [facility]

 

                                                    TDAP (ADACEL) 

VACCINE                                             2019 

00:00:00            Completed                               [facility]

 

                                                    TDAP (ADACEL) 

VACCINE                                             2019 

00:00:00            Completed                               [facility]

 

                                                    TDAP (ADACEL) 

VACCINE                                             2019 

00:00:00            Completed                               [facility]

 

                                                    TDAP (ADACEL) 

VACCINE                                             2019 

00:00:00            Completed                               [facility]

 

                                                    TDAP (ADACEL) 

VACCINE                                             2019 

00:00:00            Completed                               [facility]

 

                                                    TDAP (ADACEL) 

VACCINE                                             2019 

00:00:00            Completed                               [facility]

 

                                                    TDAP (ADACEL) 

VACCINE                                             2019 

00:00:00            Completed                               [facility]

 

                                                    TDAP (ADACEL) 

VACCINE                                             2019 

00:00:00            Completed                               [facility]

 

                                                    TDAP (ADACEL) 

VACCINE                                             2019 

00:00:00            Completed                               [facility]

 

                                                    TDAP (ADACEL) 

VACCINE                                             2019 

00:00:00            Completed                               [facility]

 

                                                    Varicella 

(varivax)(chicken 

pox)                                                2016 

00:00:00            Completed                               [facility]

 

                                                    Varicella 

(varivax)(chicken 

pox)                                                2016 

00:00:00            Completed                               [facility]

 

                                                    Varicella 

(varivax)(chicken 

pox)                                                2016 

00:00:00            Completed                               [facility]

 

                                                    Varicella 

(varivax)(chicken 

pox)                                                2016 

00:00:00            Completed                               [facility]

 

                                                    Varicella 

(varivax)(chicken 

pox)                                                2016 

00:00:00            Completed                               [facility]

 

                                                    Varicella 

(varivax)(chicken 

pox)                                                2016 

00:00:00            Completed                               [facility]

 

                                                    Varicella 

(varivax)(chicken 

pox)                                                2016 

00:00:00            Completed                               [facility]

 

                                                    Varicella 

(varivax)(chicken 

pox)                                                2016 

00:00:00            Completed                               [facility]

 

                                                    Varicella 

(varivax)(chicken 

pox)                                                2016 

00:00:00            Completed                               [facility]

 

                                                    Varicella 

(varivax)(chicken 

pox)                                                2016 

00:00:00            Completed                               [facility]

 

                                                    Varicella 

(varivax)(chicken 

pox)                                                2016 

00:00:00            Completed                               [facility]

 

                                                    Varicella 

(varivax)(chicken 

pox)                                                2016 

00:00:00            Completed                               [facility]

 

                                                    Varicella 

(varivax)(chicken 

pox)                                                2016 

00:00:00            Completed                               [facility]

 

                                                    Varicella 

(varivax)(chicken 

pox)                                                2016 

00:00:00            Completed                               [facility]

 

                                                    Varicella 

(varivax)(chicken 

pox)                                                2016 

00:00:00            Completed                               [facility]

 

                                                    Varicella 

(varivax)(chicken 

pox)                                                2016 

00:00:00            Completed                               [facility]

 

                                                    Varicella 

(varivax)(chicken 

pox)                                                2016 

00:00:00            Completed                               [facility]

 

                                                    Varicella 

(varivax)(chicken 

pox)                                                2016 

00:00:00            Completed                               [facility]

 

                                                    Varicella 

(varivax)(chicken 

pox)                                                2016 

00:00:00            Completed                               [facility]

 

                                                    Varicella 

(varivax)(chicken 

pox)                                                2016 

00:00:00            Completed                               [facility]

 

                                                    Varicella 

(varivax)(chicken 

pox)                                                2016 

00:00:00            Completed                               [facility]

 

                                                    Varicella 

(varivax)(chicken 

pox)                                                2016 

00:00:00            Completed                               [facility]

 

                                                    Varicella 

(varivax)(chicken 

pox)                                                2016 

00:00:00            Completed                               [facility]

 

                                                    Varicella 

(varivax)(chicken 

pox)                                                2016 

00:00:00            Completed                               [facility]

 

                                                    Varicella 

(varivax)(chicken 

pox)                                                2016 

00:00:00            Completed                               [facility]

 

                                                    Influenza Virus 

Vaccine (3+ yrs)                                    2014-10-14 

00:00:00            Completed                               [facility]

 

                                                    Influenza Virus 

Vaccine (3+ yrs)                                    2014-10-14 

00:00:00            Completed                               [facility]

 

                                                    Influenza Virus 

Vaccine (3+ yrs)                                    2014-10-14 

00:00:00            Completed                               [facility]

 

                                                    Influenza Virus 

Vaccine (3+ yrs)                                    2014-10-14 

00:00:00            Completed                               [facility]

 

                                                    Influenza Virus 

Vaccine (3+ yrs)                                    2014-10-14 

00:00:00            Completed                               [facility]

 

                                                    Influenza Virus 

Vaccine (3+ yrs)                                    2014-10-14 

00:00:00            Completed                               [facility]

 

                                                    Influenza Virus 

Vaccine (3+ yrs)                                    2014-10-14 

00:00:00            Completed                               [facility]

 

                                                    Influenza Virus 

Vaccine (3+ yrs)                                    2014-10-14 

00:00:00            Completed                               [facility]

 

                                                    Influenza Virus 

Vaccine (3+ yrs)                                    2014-10-14 

00:00:00            Completed                               [facility]

 

                                                    Influenza Virus 

Vaccine (3+ yrs)                                    2014-10-14 

00:00:00            Completed                               [facility]

 

                                                    Influenza Virus 

Vaccine (3+ yrs)                                    2014-10-14 

00:00:00            Completed                               [facility]

 

                                                    Influenza Virus 

Vaccine (3+ yrs)                                    2014-10-14 

00:00:00            Completed                               [facility]

 

                                                    Influenza Virus 

Vaccine (3+ yrs)                                    2014-10-14 

00:00:00            Completed                               [facility]

 

                                                    Influenza Virus 

Vaccine (3+ yrs)                                    2014-10-14 

00:00:00            Completed                               [facility]

 

                                                    Influenza Virus 

Vaccine (3+ yrs)                                    2014-10-14 

00:00:00            Completed                               [facility]

 

                                                    Influenza Virus 

Vaccine (3+ yrs)                                    2014-10-14 

00:00:00            Completed                               [facility]

 

                                                    Influenza Virus 

Vaccine (3+ yrs)                                    2014-10-14 

00:00:00            Completed                               [facility]

 

                                                    Influenza Virus 

Vaccine (3+ yrs)                                    2014-10-14 

00:00:00            Completed                               [facility]

 

                                                    Influenza Virus 

Vaccine (3+ yrs)                                    2014-10-14 

00:00:00            Completed                               [facility]

 

                                                    Influenza Virus 

Vaccine (3+ yrs)                                    2014-10-14 

00:00:00            Completed                               [facility]

 

                                                    Influenza Virus 

Vaccine (3+ yrs)                                    2014-10-14 

00:00:00            Completed                               [facility]

 

                                                    Influenza Virus 

Vaccine (3+ yrs)                                    2014-10-14 

00:00:00            Completed                               [facility]

 

                                                    Influenza Virus 

Vaccine (3+ yrs)                                    2014-10-14 

00:00:00            Completed                               [facility]

 

                                                    Influenza Virus 

Vaccine (3+ yrs)                                    2014-10-14 

00:00:00            Completed                               [facility]

 

                                                    Influenza Virus 

Vaccine (3+ yrs)                                    2014-10-14 

00:00:00            Completed                               [facility]

 

                                Tdap                            2013 

00:00:00            Completed                               [facility]

 

                                Tdap                            2013 

00:00:00            Completed                               [facility]

 

                                Tdap                            2013 

00:00:00            Completed                               [facility]

 

                                Tdap                            2013 

00:00:00            Completed                               [facility]

 

                                Tdap                            2013 

00:00:00            Completed                               [facility]

 

                                Tdap                            2013 

00:00:00            Completed                               [facility]

 

                                Tdap                            2013 

00:00:00            Completed                               [facility]

 

                                Tdap                            2013 

00:00:00            Completed                               [facility]

 

                                Tdap                            2013 

00:00:00            Completed                               [facility]

 

                                Tdap                            2013 

00:00:00            Completed                               [facility]

 

                                TDAP                            2013 

00:00:00            Completed                               [facility]

 

                                TDAP                            2013 

00:00:00            Completed                               [facility]

 

                                TDAP                            2013 

00:00:00            Completed                               [facility]

 

                                TDAP                            2013 

00:00:00            Completed                               [facility]

 

                                TDAP                            2013 

00:00:00            Completed                               [facility]

 

                                TDAP                            2013 

00:00:00            Completed                               [facility]

 

                                TDAP                            2013 

00:00:00            Completed                               [facility]

 

                                TDAP                            2013 

00:00:00            Completed                               [facility]

 

                                TDAP                            2013 

00:00:00            Completed                               [facility]

 

                                TDAP                            2013 

00:00:00            Completed                               [facility]

 

                                TDAP                            2013 

00:00:00            Completed                               [facility]

 

                                TDAP                            2013 

00:00:00            Completed                               [facility]

 

                                TDAP                            2013 

00:00:00            Completed                               [facility]

 

                                Tdap                            2013 

00:00:00            Completed                               [facility]

 

                                TDAP                            2013 

00:00:00            Completed                               [facility]

 

                    TDAP                Unknown   Completed           [facility]

 

                                                    Influenza Virus 

Vaccine (3+ yrs)              Unknown      Completed                 [facility]

 

                                                    Varicella 

(varivax)(chicken 

pox)                      Unknown      Completed                 [facility]

 

                    HPV9                Unknown   Completed           [facility]







Vital Signs





                      Vital Name Observation Time Observation Value Comments   S

ource

 

                                                    Systolic blood 

pressure        2022 22:30:00 144 mm[Hg]                      [facility]

 

                                                    Diastolic blood 

pressure        2022 22:30:00 99 mm[Hg]                       [facility]

 

                      Heart rate 2022 22:30:00 74 /min               [facility]

 

                      Respiratory rate 2022 22:30:00 16 /min              

 [facility]

 

                                                    Oxygen saturation in 

Arterial blood by 

Pulse oximetry  2022 22:30:00 100 /min                        [facility]

 

                      Body temperature 2022 18:32:00 36.83 Megha            

 [facility]

 

                      Body weight 2022 18:32:00 107.049 kg            [facility]

 

                      BMI        2022 18:32:00 35.88 kg/m2            [facility]

 

                                                    Systolic blood 

pressure        2022 02:40:00 133 mm[Hg]                      [facility]

 

                                                    Diastolic blood 

pressure        2022 02:40:00 86 mm[Hg]                       [facility]

 

                      Heart rate 2022 02:40:00 76 /min               [facility]

 

                      Respiratory rate 2022 02:40:00 16 /min              

 [facility]

 

                                                    Oxygen saturation in 

Arterial blood by 

Pulse oximetry  2022 02:40:00 99 /min                         [facility]

 

                      Body temperature 2022 00:04:00 37.28 Megha            

 [facility]

 

                      Body height 2022 00:04:00 172.7 cm              [facility]

 

                      Body weight 2022 00:04:00 107.049 kg            [facility]

 

                      BMI        2022 00:04:00 35.88 kg/m2            [facility]

 

                                                    Systolic blood 

pressure        2021 05:42:00 134 mm[Hg]                      [facility]

 

                                                    Diastolic blood 

pressure        2021 05:42:00 68 mm[Hg]                       [facility]

 

                      Heart rate 2021 05:42:00 107 /min              [facility]

[facility]

 

                      Body temperature 2021 05:42:00 38.11 [facility]

 

                      Respiratory rate 2021 05:42:00 18 /min              

 [facility]

 

                                                    Oxygen saturation in 

Arterial blood by 

Pulse oximetry  2021 05:42:00 98 /min                         [facility]

 

                      Body height 2021 03:25:00 172.7 cm              [facility]

 

                      Body weight 2021 03:25:00 111.585 kg            [facility]

 

                      BMI        2021 03:25:00 37.40 kg/m2            [facility]

 

                                                    Systolic blood 

pressure        2021 13:49:00 139 mm[Hg]                      [facility]

 

                                                    Diastolic blood 

pressure        2021 13:49:00 88 mm[Hg]                       [facility]

 

                      Heart rate 2021 13:49:00 91 /min               [facility]

[facility]

 

                      Body temperature 2021 13:49:00 36.72 [facility]

 

                      Respiratory rate 2021 13:49:00 18 /min              

 [facility]

 

                      Body height 2021 13:49:00 172.7 cm              [facility]

 

                      Body weight 2021 13:49:00 107.049 kg            [facility]

[facility]

 

                      BMI        2021 13:49:00 35.88 kg/m2            [facility]

 

                                                    Oxygen saturation in 

Arterial blood by 

Pulse oximetry  2021 13:49:00 99 /min                         [facility]

 

                                                    Systolic blood 

pressure        2021 06:52:00 123 mm[Hg]                      [facility]

 

                                                    Diastolic blood 

pressure        2021 06:52:00 82 mm[Hg]                       [facility]

 

                      Heart rate 2021 06:52:00 62 /min               [facility]

[facility]

 

                      Body temperature 2021 06:52:00 36.72 Megha            

 [facility]

 

                      Respiratory rate 2021 06:52:00 16 /min              

 [facility]

 

                      Body weight 2021 06:52:00 107.049 kg            [facility]

 

                      BMI        2021 06:52:00 35.88 kg/m2            [facility]

 

                                                    Oxygen saturation in 

Arterial blood by 

Pulse oximetry  2021 06:52:00 99 /min                         [facility]

 

                                                    Systolic blood 

pressure        2021 05:38:00 172 mm[Hg]                      [facility]

 

                                                    Diastolic blood 

pressure        2021 05:38:00 105 mm[Hg]                      [facility]

 

                      Heart rate 2021 05:38:00 74 /min               [facility]

[facility]

 

                      Respiratory rate 2021 05:38:00 16 /min              

 [facility]

 

                                                    Oxygen saturation in 

Arterial blood by 

Pulse oximetry  2021 05:00:00 97 /min                         [facility]

 

                      Body temperature 2021 03:02:00 37.06 Megha            

 [facility]

 

                      Body height 2021 03:02:00 172.7 cm              [facility]

 

                      Body weight 2021 03:02:00 107.049 kg            [facility]

 

                      BMI        2021 03:02:00 35.88 kg/m2            [facility]

 

                      Heart rate 2021 16:59:00 89 /min               [facility]

[facility]

 

                      Body temperature 2021 16:59:00 37.17 Megha            

 [facility]

 

                      Respiratory rate 2021 16:59:00 18 /min              

 [facility]

 

                      Body weight 2021 16:59:00 107.049 kg            [facility]

[facility]

 

                      BMI        2021 16:59:00 35.88 kg/m2            [facility]

[facility]

 

                                                    Oxygen saturation in 

Arterial blood by 

Pulse oximetry  2021 16:59:00 99 /min                         [facility]

 

                                                    Systolic blood 

pressure        2021-10-29 19:00:00 122 mm[Hg]                      [facility]

 

                                                    Diastolic blood 

pressure        2021-10-29 19:00:00 76 mm[Hg]                       [facility]

 

                      Heart rate 2021-10-29 19:00:00 54 /min               [facility]

[facility]

 

                                                    Oxygen saturation in 

Arterial blood by 

Pulse oximetry  2021-10-29 19:00:00 100 /min                        [facility]

 

                      Respiratory rate 2021-10-29 18:00:00 18 /min              

 [facility]

 

                      Body temperature 2021-10-29 14:24:00 36.72 Megha            

 [facility]

 

                      Body weight 2021-10-29 14:24:00 107.049 kg            [facility]

 

                      BMI        2021-10-29 14:24:00 35.88 kg/m2            [facility]

 

                                                    Systolic blood 

pressure        2020 00:00:00 119 mm[Hg]                      [facility]

 

                                                    Diastolic blood 

pressure        2020 00:00:00 79 mm[Hg]                       [facility]

 

                      Heart rate 2020 00:00:00 84 /min               [facility]

[facility]

 

                      Body temperature 2020 00:00:00 36.83 Megha            

 [facility]

 

                      Respiratory rate 2020 00:00:00 22 /min              

 [facility]

 

                                                    Oxygen saturation in 

Arterial blood by 

Pulse oximetry  2020 00:00:00 100 /min                        [facility]

 

                      Body weight 2020 22:45:00 107.049 kg            [facility]

[facility]

 

                      BMI        2020 22:45:00 35.88 kg/m2            [facility]

 

                                                    Systolic blood 

pressure        2020 20:41:00 134 mm[Hg]                      [facility]

 

                                                    Diastolic blood 

pressure        2020 20:41:00 87 mm[Hg]                       [facility]

 

                      Heart rate 2020 20:41:00 91 /min               [facility]

[facility]

 

                      Body temperature 2020 20:41:00 37.22 Megha            

 [facility]

 

                      Respiratory rate 2020 20:41:00 18 /min              

 [facility]

 

                      Body weight 2020 20:41:00 105.235 kg            [facility]

 

                      BMI        2020 20:41:00 35.28 kg/m2            [facility]

 

                                                    Oxygen saturation in 

Arterial blood by 

Pulse oximetry  2020 20:41:00 99 /min                         [facility]

 

                                                    Oxygen saturation in 

Arterial blood by 

Pulse oximetry  2019 15:45:00 99 /min                         [facility]

 

                                                    Systolic blood 

pressure        2019 15:15:00 123 mm[Hg]                      [facility]

 

                                                    Diastolic blood 

pressure        2019 15:15:00 86 mm[Hg]                       [facility]

 

                      Heart rate 2019 15:15:00 74 /min               [facility]

[facility]

 

                      Respiratory rate 2019 15:15:00 17 /min              

 [facility]

 

                      Body temperature 2019 14:00:00 36.22 Megha            

 [facility]

 

                      Body height 2019 11:12:00 172.7 cm              [facility]

 

                      Body weight 2019 11:12:00 109 kg                [facility]

 

                      BMI        2019 11:12:00 36.54 kg/m2            [facility]

 

                                                    Systolic blood 

pressure        2019-08-15 15:26:00 142 mm[Hg]                      [facility]

 

                                                    Diastolic blood 

pressure        2019-08-15 15:26:00 93 mm[Hg]                       [facility]

 

                      Heart rate 2019-08-15 15:26:00 72 /min               [facility]

[facility]

 

                      Body temperature 2019-08-15 15:26:00 35.28 Megha            

 [facility]

 

                      Respiratory rate 2019-08-15 15:26:00 16 /min              

 [facility]

 

                      Body height 2019-08-15 15:26:00 172.7 cm              [facility]

[facility]

 

                      Body weight 2019-08-15 15:26:00 105.8 kg              [facility]

[facility]

 

                      BMI        2019-08-15 15:26:00 35.47 kg/m2            [facility]

 

                                                    Oxygen saturation in 

Arterial blood by 

Pulse oximetry  2019-08-15 15:26:00 100 /min                        [facility]

 

                                                    Systolic blood 

pressure        2019 15:07:00 120 mm[Hg]                      [facility]

 

                                                    Diastolic blood 

pressure        2019 15:07:00 82 mm[Hg]                       [facility]

 

                      Heart rate 2019 15:06:00 68 /min               [facility]

 

                      Body temperature 2019 15:06:00 36.67 Megha            

 [facility]

 

                      Respiratory rate 2019 15:06:00 16 /min              

 [facility]

 

                      Body height 2019 15:06:00 172.7 cm              [facility]

 

                      Body weight 2019 15:06:00 106.369 kg            [facility]

 

                      BMI        2019 15:06:00 35.66 kg/m2            [facility]

 

                                                    Systolic blood 

pressure        2019 16:16:00 124 mm[Hg]                      [facility]

 

                                                    Diastolic blood 

pressure        2019 16:16:00 78 mm[Hg]                       [facility]

 

                      Heart rate 2019 16:11:00 87 /min               [facility]

[facility]

 

                      Body temperature 2019 16:11:00 37.22 Megha            

 [facility]

 

                      Respiratory rate 2019 16:11:00 16 /min              

 [facility]

 

                      Body height 2019 16:11:00 172.7 cm              [facility]

 

                      Body weight 2019 16:11:00 110.791 kg            [facility]

 

                      BMI        2019 16:11:00 37.14 kg/m2            [facility]

[facility]

 

                                                    Systolic blood 

pressure        2019 13:00:00 122 mm[Hg]                      [facility]

 

                                                    Diastolic blood 

pressure        2019 13:00:00 78 mm[Hg]                       [facility]

 

                      Heart rate 2019 13:00:00 80 /min               [facility]

[facility]

 

                      Body temperature 2019 13:00:00 36.61 Megha            

 [facility]

 

                      Respiratory rate 2019 13:00:00 20 /min              

 [facility]

 

                                                    Oxygen saturation in 

Arterial blood by 

Pulse oximetry  2019 13:00:00 99 /min                         [facility]







Procedures





                                        Procedure           Date / Time 

Performed                 Performing Clinician      Source

 

                                                    US PELVIS COMPLETE WITH 

TRANSVAGINAL        2022 22:18:00 Amando Narvaez     [facility]

 

                                                    COMP. METABOLIC PANEL 

([id number])             2022 19:43:00 Singer, [facility]

 

                          CBC WITH DIFF 2022 19:43:00 Singer, Amando [facility]

 

                          URINALYSIS   2022 19:43:00 Amando Narvaez

[facility]

 

                                                    ADC CLC OR [facility] ONLY - 

WET PREP            2022 19:43:00 Singer, Amando     [facility]

 

                                                    CONSENT/REFUSAL FOR 

DIAGNOSIS AND TREATMENT   2022 18:24:53       Doctor Unassigned, No 

Name                                    [facility]

 

                          POCT PREGNANCY TEST 2022 01:40:00 Sloan Nelson [facility]

 

                          URINALYSIS   2022 01:38:00 Sloan Nelson [facility]

 

                          RAPID INFLUENZA A/B 2021 03:32:00 [employer] [facility]

 

                                                    COVID-19 (ID NOW RAPID 

TESTING)            2021 03:32:00 [employer]     [facility] [facility]

 

                                                    CONSENT/REFUSAL FOR 

DIAGNOSIS AND TREATMENT   2021 03:00:49       Doctor Unassigned, No 

Name                                    [facility]

 

                                                    CONSENT/REFUSAL FOR 

DIAGNOSIS AND TREATMENT   2021 13:45:43       Doctor Unassigned, No 

Name                                    [facility]

 

                                                    EMERGENCY DEPARTMENT 

DOCUMENTS                 2021 06:01:00       Doctor Unassigned, No 

Name                                    [facility]

 

                                                    CT SOFT TISSUE NECK W 

CONTRAST            2021 03:55:42 Cynthia Fitch    [facility]

 

                          PREGNANCY TEST, SERUM 2021 03:36:00 Polina Fitch [facility]

 

                                                    BASIC METABOLIC PANEL 

(NA, K, CL, CO2, 

GLUCOSE, BUN, 

CREATININE, CA)     2021 03:36:00 Cynthia Fitch    [facility]

 

                          CBC WITH DIFF 2021 03:36:00 Cynthia Fitch [facility]

 

                                                    NOTICE OF PRIVACY 

PRACTICES                 2021 02:57:07       Doctor Unassigned, No 

Name                                    [facility]

 

                                                    CONSENT/REFUSAL FOR 

DIAGNOSIS AND TREATMENT   2021 02:55:44       Doctor Unassigned, No 

Name                                    [facility]

 

                                                    RAPID STREP SCREEN FOR 

GROUP A             2021 17:03:00 Amando Narvaez     [facility]

 

                                                    CONSENT/REFUSAL FOR 

DIAGNOSIS AND TREATMENT   2021 16:53:00       Doctor Unassigned, No 

Name                                    [facility]

 

                                                    CT ABDOMEN PELVIS W 

CONTRAST            2021-10-29 17:50:39 Bharti Fuentes     [facility]

 

                                                    US PELVIS COMPLETE WITH 

TRANSVAGINAL        2021-10-29 16:38:35 Bharti Fuentes     [facility]

 

                          POCT PREGNANCY TEST 2021-10-29 14:34:00 Cecil Fuentes [facility]

 

                          LIPASE       2021-10-29 14:31:00 Bharti Fuentes [facility]

[facility]

 

                                                    COMP. METABOLIC PANEL 

(70326)             2021-10-29 14:31:00 Bharti Fuentes     [facility]

 

                          CBC WITH DIFF 2021-10-29 14:31:00 Bharti Fuentes [facility]

 

                          URINALYSIS   2021-10-29 14:31:00 Bharti Fuentes [facility]

[facility]

 

                                                    COVID-19 (ID NOW RAPID 

TESTING)            2021-10-29 14:31:00 Bharti Fuentes     [facility]

 

                                                    NOTICE OF PRIVACY 

PRACTICES                 2021-10-29 14:20:02       Doctor Unassigned, No 

Name                                    [facility]

 

                                                    CONSENT/REFUSAL FOR 

DIAGNOSIS AND TREATMENT   2021-10-29 14:19:50       Doctor Unassigned, No 

Name                                    [facility]

 

                                                    RAPID STREP SCREEN FOR 

GROUP A             2020 23:28:00 Cristobal Rodrigues [facility]

 

                          POCT PREGNANCY TEST 2020 23:01:00 Arnaldo Rodrigues [facility]

 

                                                    NOTICE OF PRIVACY 

PRACTICES                 2020 22:36:17       Doctor Unassigned, No 

Name                                    [facility]

 

                                                    CONSENT/REFUSAL FOR 

DIAGNOSIS AND TREATMENT   2020 22:36:05       Doctor Unassigned, No 

Name                                    [facility]

 

                                                    INDIRECT ANTIGLOBULIN 

TEST                      2019 11:30:00       Katy Jones                                   [facility]

 

                                ASSIGNMENT OF BENEFITS 2019 09:55:52 Docto

r Unassigned, No 

Name                                    [facility]

 

                          TYPE AND SCREEN 2019-08-15 15:52:00 Carlos Eduardo Sanders

[facility]

 

                          CBC WITH DIFFERENTIAL 2019-08-15 15:51:00 Lois Sanders rn [facility]

 

                                ASSIGNMENT OF BENEFITS 2019-08-15 15:15:47 Docto

r Unassigned, No 

Name                                    [facility]

 

                                DAY SURGERY - [place] 2019-08-15 05:01:00 Doct

or Unassigned, No 

Name                                    [facility]

 

                                CBC WITH DIFFERENTIAL 2019 07:36:00 Carina Ceron                            [facility]

 

                          VENOUS CORD GAS 2019 03:29:00 Brennan Taylor

[facility]

 

                           SECTION 2019 02:20:00 Edson Pierce [facility]

 

                          URIC ACID    2019 02:06:00 Catrachito Butt [facility]

 

                          CBC WITH DIFFERENTIAL 2019 02:06:00 Mikhail Butt [facility]

 

                                                    SGOT (ASPARTATE AMINO 

TRANSFER)           2019 02:06:00 Catrachito Butt      [facility]

 

                          CREATININE   2019 02:06:00 Catrachito Butt [facility]

 

                                                    ALANINE AMINO 

TRANSFERASE(SGPT    2019 02:06:00 Catrachito Butt      [facility]

 

                          LACTATE DEHYDROGENASE 2019 02:06:00 Mikhail Butt [facility]

 

                          URINALYSIS   2019 02:05:00 Catrachito Butt

[facility]

 

                                                    PROTEIN CREAT RATIO 

URINE RANDOM        2019 02:05:00 Catrachito Butt      [facility]

 

                          CBC WITH DIFFERENTIAL 2019 16:13:00 Claudia, Brennan    

 [facility]

 

                                                    HEPATITIS B SURFACE 

ANTIGEN             2019 16:13:00 Claudia, [facility]

 

                                                    GALV ONLY - SYPHILIS 

IGG/IGM             2019 16:13:00 Claudia, [facility]

 

                          TYPE AND SCREEN 2019 15:54:00 Claudia, [facility]







Encounters





                                                    Start 

Date/Time                               End 

Date/Time                               Encounter 

Type                                    Admission 

Type                                    Attending 

[facility] 

Facility                                Care 

Department                              Encounter 

ID                                      Source

 

                                                    2021-10-30 

07:04:42         Emergency                 [facility]    [id number] [facility]

 

                                                    2021-10-28 

23:03:26         Emergency                 [facility]    [id number] [facility]

 

                                                    2023 

14:53:18                                2023 

14:53:18   Outpatient                       [facility]        [id number]

[id number]                                    Michael Christine

 

                                                    2022 

13:33:00                                2022 

18:01:00            Emergency           X                   AMANDO NARVAEZ         [facility]            [place]             [id number]      [facility]

 

                                                    2022 

13:33:00                                2022 

18:01:00            Emergency                               Singer 

Amando                                 [facility]                                  [id number]

[id number]

[id number]

[phone]

084                       [id number]                  [facility]

 

                                                    2022 

19:06:00                                2022 

21:48:00            Emergency           X                   SLOAN NELSON          [facility]            [place]             [id number]      [facility]

 

                                                    2022 

19:06:00                                2022 

21:48:00            Emergency                               Sloan Nelson                                [facility]                                  [id number]

[id number]

[id number]

[phone]

084                       [id number]                  [facility]

 

                                                    2021 

21:27:00                                2021 

23:45:00  Emergency X         PORSCHE POWER [facility]      ERT       [id number] [facility]

 

                                                    2021 

21:27:00                                2021 

23:45:00            Emergency                               PORSCHE Power 

Carol                                   [facility]                                  [id number]

[id number]

4.2.7.2.686

[phone]

084                       [id number]                  [facility]

 

                                                    2021 

00:00:00                                2021 

00:00:00                                Patient 

Secure Msg                                          Doctor 

Unassigned, 

No Name                                 [facility]                                1.840.114

[id number]

4.2.7.2.686

[phone]

019                       [id number]                  [facility]

 

                                                    2021 

07:49:00                                2021 

08:30:00            Emergency           X                   AMANDO NARVAEZ         [facility]            [place]             [id number]      [facility]

 

                                                    2021 

07:49:00                                2021 

08:30:00            Emergency                               Amando Narvaez                                 [facility]                                  1.2840.114

350.1.13.10

4.2.7.2.686

[phone]

084                       [id number]                  [facility]

 

                                                    2021 

00:53:00                                2021 

02:30:00            Emergency                               [place] 

Tom                                  [facility]                                  1.2840.114

[id number]

4.2.7.2.686

[id number]

014                       [id number]                  [facility]

 

                                                    2021 

00:00:00                                2021 

00:00:00                                Orders 

Only                                                Doctor 

Unassigned, 

No Name                                 [facility]                                1.2840.114

350.1.13.10

4.2.7.2.686

[phone]

009                       [id number]                  [facility]

 

                                                    2021 

21:05:00                                2021 

23:46:00            Emergency           X                   CYNTHIA FITCH         [facility]            [place]             [id number]      [facility]

 

                                                    2021 

21:05:00                                2021 

23:46:00            Emergency           X                   CYNTHIA FITCH         [facility]            [place]             [id number]      [facility]

 

                                                    2021 

21:05:00                                2021 

23:46:00            Emergency                               Cynthia Fitch                                 [facility]                                  1.2840.114

350.1.13.10

4.2.7.2.686

[phone]

084                       [id number]                  [facility]

 

                                                    2021 

11:03:00                                2021 

12:01:00            Emergency           X                   AMANDO NARVAEZ         [facility]            [place]             [id number]      [facility]

 

                                                    2021 

11:03:00                                2021 

12:01:00            Emergency                               Amando Narvaez                                 [facility]                                  1.2840.114

350.1.13.10

4.2.7.2.686

[phone]

084                       [id number]                  [facility]

 

                                                    2021 

00:00:00                                2021 

00:00:00                                Orders 

Only                                                Doctor 

Unassigned, 

No Name                                 [facility]                                1.2840.114

350.1.13.10

4.2.7.2.686

[phone]

009                       [id number]                  [facility]

 

                                                    2021-10-29 

09:23:00                                2021-10-29 

14:05:00            Emergency           X                   BHARTI FUENTES         [facility]            [place]             [id number]      [facility]

 

                                                    2021-10-29 

09:23:00                                2021-10-29 

14:05:00            Emergency                               Bharti Fuentes                                 [facility]                                  1.2.840.114

350.1.13.10

4.2.7.2.686

[phone]

084                       [id number]                  [facility]

 

                                                    2021-10-29 

00:00:00                                2021-10-29 

00:00:00                                Orders 

Only                                                Doctor 

Unassigned, 

No Name                                 [facility]                                1.2840.114

350.1.13.10

4.2.7.2.686

[phone]

009                       [id number]                  [facility]

 

                                                    2020 

00:00:00                                2020 

00:00:00            Telephone                               Fabienne Steele                                   [facility]                                1.840.114

350.1.13.10

4.2.7.2.686

[phone]

019                       [id number]                  [facility]

 

                                                    2020 

16:49:00                                2020 

18:31:00            Emergency                               Cristobal Rodrigues                               [facility]                                  1..114

350.1.13.10

4.2.7.2.686

[phone]

084                       [id number]                  [facility]

 

                                                    2020 

00:00:00                                2020 

00:00:00                                Orders 

Only                                                Doctor 

Unassigned, 

No Name                                 [facility]                                1..114

350.1.13.10

4.2.7.2.686

[phone]

009                       [id number]                  [facility]

 

                                                    2020 

11:00:00                                2020 

11:00:00            Outpatient          R                   SABINA AVILEZ         [facility]            [id number]      [facility]

 

                                                    2020 

00:00:00                                2020 

00:00:00            Telephone                               Jasmin Pulido                                       [facility] 

Office 

Building 

One                                     .114

350.1.13.10

4.2.7.2.686

[phone]

044                       [id number]                  [facility]

 

                                                    2020 

15:27:21                                2020 

15:47:21                                Urgent 

Care                                                [facility]

Jasmin Pulido                                       [facility] 

Office 

Building 

One                                     .114

350.1.13.10

4.2.7.2.686

[phone]

044                       [id number]                  [facility]

 

                                                    2020 

15:40:00                                2020 

15:40:00  Outpatient R                   [facility]      [id number] [facility]

 

                                                    2019 

04:56:19                                2019 

10:45:00                                Hospital 

Encounter                                           [facility]                                1.2840.114

350.1.13.10

4.2.7.2.686

[phone]

104                       [id number]                  [facility]

 

                                                    2019 

00:00:00                                2019 

00:00:00                                Orders 

Only                                                Doctor 

Unassigned, 

No Name                                 [facility]                                1.2.114

350.1.13.10

4.2.7.2.686

[phone]

009                       [id number]                  [facility]

 

                                                    2019-08-15 

10:17:00                                2019-08-15 

13:55:00                                Hospital 

Encounter                                           [facility]                                1.20.114

350.1.13.10

4.2.7.2.686

[phone]

101                       [id number]                  [facility]

 

                                                    2019-08-15 

00:00:00                                2019-08-15 

00:00:00                                Orders 

Only                                                Doctor 

Unassigned, 

No Name                                 [facility]                                1..114

350.1.13.10

4.2.7.2.686

[phone]

009                       [id number]                  [facility]

 

                                                    2019-08-15 

00:00:00                                2019-08-15 

00:00:00                                Prep For 

Surgery                                             JosueKaty James 

[facility]                                 1.114

350.1.13.10

4.2.7.2.686

[phone]

113                       [id number]                  [facility]

 

                                                    2019 

09:50:02                                2019 

10:35:34                                Routine 

Prenatal 

Visit                                               Crystal Kay                              [facility] 

OB/GYN 

[facility] 

MATERNAL 

& CHILD 

HEALTH 

CLIN[facility]                                1.2.114

350.1.13.10

4.2.7.2.686

[id number]

107                       [id number]                  [facility]

 

                                                    2019 

10:43:43                                2019 

11:33:58                                Nurse 

Visit                                               Visit, 

Ang[facility] 

Nurse

Harleen Cho                              [facility] 

OB/GYN 

[facility] 

MATERNAL 

& CHILD 

HEALTH 

[facility]                                1.2.840.114

350.1.13.10

4.2.7.2.686

[phone]

107                       [id number]                  [facility]

 

                                                    2019 

09:36:00                                2019 

15:52:00                                Hospital 

Encounter                                           Leonardo Núñezta                                [facility]                                1.2840.114

350.1.13.10

4.2.7.2.686

[phone]

063                       [id number]                  [facility]

 

                                                    2019 

00:00:00                                2019 

00:00:00                                Prep For 

Surgery                                             [facility] 

Carlos Eduardo                                 [facility]                                 1.2840.114

350.1.13.10

4.2.7.2.686

[phone]

113                       [id number]                  [facility]







Results





                    Test Description Test Time Test Comments Results   Result Co

mments Source







                                        

 

                                        

 

                                        

 

                                        

 

                                        

 

                                        

 

                                        

 

                                        

 

                                        

 

                                        

 

                                        

 

                                        

 

                                        

 

                                        

 

                                        

 

                                        

 

                                        

 

                                        

 

                                        

 

                                        





[facility] WITH [id number] 19:53:31* 



                      Test Item  Value      Reference Range Interpretation Comme

nts

 

                                                    WBC (test code = 

6690-2)                         See_Comment                     [Automated messa

[employer]] 

The system which 

generated this 

result transmitted 

reference range: 

4.30 - 11.10 

10*3/?L. The 

reference range was 

not used to 

interpret this 

result as 

normal/abnormal.

 

                                                    RBC (test code = 

789-8)                          See_Comment                     [Automated messa

ge] 

The system which 

generated this 

result transmitted 

reference range: 

3.93 - 5.25 

10*6/?L. The 

reference range was 

not used to 

interpret this 

result as 

normal/abnormal.

 

                                                    HGB (test code = 

718-7)          9.7 g/dL        11.6-15.0       L               

 

                                                    HCT (test code = 

4544-3)         32.3 %          35.7-45.2       L               

 

                                                    MCV (test code = 

787-2)          76.0 fL         80.6-95.5       L               

 

                                                    MCH (test code = 

785-6)          22.8 pg         25.9-32.8       L               

 

                                                    MCHC (test code = 

786-4)          30.0 g/dL       31.6-35.1       L               

 

                                                    RDW-SD (test code = 

93390-0)        50.2 fL         39.0-49.9       H               

 

                                                    RDW-CV (test code = 

788-0)          18.1 %          12.0-15.5       H               

 

                                                    PLT (test code = 

777-3)                          See_Comment     H               [Automated messa

ge] 

The system which 

generated this 

result transmitted 

reference range: 

166 - 358 10*3/?L. 

The reference range 

was not used to 

interpret this 

result as 

normal/abnormal.

 

                                                    MPV (test code = 

47010-5)        9.8 fL          9.5-12.9                        

 

                                                    NRBC/100 WBC (test 

code = 3553124329)                 See_Comment                     [Automated me

ssage] 

The system which 

generated this 

result transmitted 

reference range: 

0.0 - 10.0 /100 

WBCs. The reference 

range was not used 

to interpret this 

result as 

normal/abnormal.

 

                                                    NRBC x10^3 (test code 

= 8712438610)   <0.01           See_Comment                     [Automated messa

ge] 

The system which 

generated this 

result transmitted 

reference range: 

10*3/?L. The 

reference range was 

not used to 

interpret this 

result as 

normal/abnormal.

 

                                                    GRAN MAT (NEUT) % 

(test code = 770-8) 51.2 %                                          

 

                                                    IMM GRAN % (test code 

= 4420329710)   0.20 %                                          

 

                                                    LYMPH % (test code = 

736-9)          36.1 %                                          

 

                                                    MONO % (test code = 

5905-5)         9.4 %                                           

 

                                                    EOS % (test code = 

713-8)          2.4 %                                           

 

                                                    BASO % (test code = 

706-2)          0.7 %                                           

 

                                                    GRAN MAT x10^3(ANC) 

(test code = 

9282613534)     2.95 10*3/uL    1.88-7.09                       

 

                                                    IMM GRAN x10^3 (test 

code = 5448766040) <0.03           0.00-0.06                       

 

                                                    LYMPH x10^3 (test code 

= 731-0)        2.08 10*3/uL    1.32-3.29                       

 

                                                    MONO x10^3 (test code 

= 742-7)        0.54 10*3/uL    0.33-0.92                       

 

                                                    EOS x10^3 (test code = 

711-2)          0.14 10*3/uL    0.03-0.39                       

 

                                                    BASO x10^3 (test code 

= 704-7)        0.04 10*3/uL    0.01-0.07                       

 

                                                    Lab Interpretation 

(test code = 11348-1) Abnormal                                        





[id number] 



                      Test Item  Value      Reference Range Interpretation Comme

nts

 

                      POCT PREG (test code = 1605) negative                     

    

 

                                                    On board controls acceptable

 with 

C Line (test code = 3574) present                                         

 

                      POCT PREG LOT # (test code = 3575) [id number]             

          

 

                                                    POCT PREG TEST  DATE (

test 

code = 3576)                                          

 

                                                    Lab Interpretation (test cod

e = 

94341-7)        Normal                                          





[facility] [id number] 03[facility]59:03* 



                      Test Item  Value      Reference Range Interpretation Comme

[facility]

 

                                                    PREG SERUM (test code 

= 9321997743)   Negative                                        

 

                                        ALFONZO (test code = ALFONZO) Less than 10 IU/L.

 ?If 

low titer or ectopic 

pregnancy is suspected, 

resubmit specimen in 

48-72 hours.                                                





[facility] METABOLIC PANEL (NA, K, CL, CO2, 
GLUCOSE, BUN, CREATININE, CA)2021 03:55:47* 



                      Test Item  Value      Reference Range Interpretation Comme

[facility]

 

                                                    NA (test code = 

4681957454)     135 mmol/L      135-145                         

 

                                                    K (test code = 

5728837184)     4.8 mmol/L      3.5-5.0                         

 

                                                    CL (test code = 

4739366679)     102 mmol/L                                

 

                                                    CO2 TOTAL (test code = 

6979988712)     25 mmol/L       23-31                           

 

                                                    AGAP (test code = 

8772335964)                     2-16                            

 

                                                    BUN (test code = 

0672560891)     12 mg/dL        7-23                            

 

                                                    GLUCOSE (test code = 

0907493919)     129 mg/dL                 H               

 

                                                    CREATININE (test code = 

4662136654)     0.70 mg/dL      0.50-1.04                       

 

                                                    CALCIUM (test code = 

8218634408)     9.1 mg/dL       8.6-10.6                        

 

                                                    eGFR (test code = 

7921009685)                     mL/min/1.73m2                   

 

                                        ALFONZO (test code = ALFONZO) Association of 

Glomerular Filtration 

Rate (GFR) and Staging 

of Kidney Disease* 

+---------------------

--+-------------------

--+-------------------

------+| GFR 

(mL/min/1.73 m2) ?| 

With Kidney Damage ?| 

?Without Kidney 

Damage+---------------

--------+-------------

--------+-------------

------------+| ?>90 ? 

? ? ? ? ? ? ? ?| 

?Stage one ? ? ? ? ?| 

? Normal ? ? ? ? ? ? ? 

?+--------------------

---+------------------

---+------------------

-------+| ?60-89 ? ? ? 

? ? ? ? ?| ?Stage two 

? ? ? ? ?| ? Decreased 

GFR ? ? ? ? 

+---------------------

--+-------------------

--+-------------------

------+| ?30-59 ? ? ? 

? ? ? ? ?| ?Stage 

three ? ? ? ?| ? Stage 

three ? ? ? ? ? 

+---------------------

--+-------------------

--+-------------------

------+| ?15-29 ? ? ? 

? ? ? ? ?| ?Stage four 

? ? ? ? | ? Stage four 

? ? ? ? ? 

?+--------------------

---+------------------

---+------------------

-------+| ?<15 (or 

dialysis) ? ?| ?Stage 

five ? ? ? ? | ? Stage 

five ? ? ? ? ? 

?+--------------------

---+------------------

---+------------------

-------+ *Each stage 

assumes the associated 

GFR level has been in 

effect for at least 

three months. ?Stages 

1 to 5, with or 

without kidney 

disease, indicate 

chronic kidney 

disease. Notes: 

Determination of 

stages one and two 

(with eGFR 

>59mL/min/1.73 m2) 

requires estimation of 

kidney damage for at 

least three months as 

defined by structural 

or functional 

abnormalities of the 

kidney, manifested by 

either:Pathological 

abnormalities or 

Markers of kidney 

damage (including 

abnormalities in the 

composition of the 

blood or urine or 

abnormalities in 

imaging tests).                                             

 

                                                    Lab Interpretation 

(test code = 82625-7) Abnormal                                        





[facility] WITH [id number] 03:44:45* 



                      Test Item  Value      Reference Range Interpretation Comme

nts

 

                                                    WBC (test code = 

6690-2)                         See_Comment                     [Automated frestyl] 

The system which 

generated this 

result transmitted 

reference range: 

4.30 - 11.10 

10*3/?L. The 

reference range was 

not used to 

interpret this 

result as 

normal/abnormal.

 

                                                    RBC (test code = 

789-8)                          See_Comment                     [Automated frestyl] 

The system which 

generated this 

result transmitted 

reference range: 

3.93 - 5.25 

10*6/?L. The 

reference range was 

not used to 

interpret this 

result as 

normal/abnormal.

 

                                                    HGB (test code = 

718-7)          9.6 g/dL        11.6-15.0       L               

 

                                                    HCT (test code = 

4544-3)         31.1 %          35.7-45.2       L               

 

                                                    MCV (test code = 

787-2)          76.4 fL         80.6-95.5       L               

 

                                                    MCH (test code = 

785-6)          23.6 pg         25.9-32.8       L               

 

                                                    MCHC (test code = 

786-4)          30.9 g/dL       31.6-35.1       L               

 

                                                    RDW-SD (test code = 

43074-5)        45.8 fL         39.0-49.9                       

 

                                                    RDW-CV (test code = 

788-0)          16.6 %          12.0-15.5       H               

 

                                                    PLT (test code = 

777-3)                          See_Comment                     [Automated messa

ge] 

The system which 

generated this 

result transmitted 

reference range: 

166 - 358 10*3/?L. 

The reference range 

was not used to 

interpret this 

result as 

normal/abnormal.

 

                                                    MPV (test code = 

02974-3)        9.7 fL          9.5-12.9                        

 

                                                    NRBC/100 WBC (test 

code = 5041746371)                 See_Comment                     [Automated me

ssage] 

The system which 

generated this 

result transmitted 

reference range: 

0.0 - 10.0 /100 

WBCs. The reference 

range was not used 

to interpret this 

result as 

normal/abnormal.

 

                                                    NRBC x10^3 (test code 

= 4226977860)   <0.01           See_Comment                     [Automated messa

ge] 

The system which 

generated this 

result transmitted 

reference range: 

10*3/?L. The 

reference range was 

not used to 

interpret this 

result as 

normal/abnormal.

 

                                                    GRAN MAT (NEUT) % 

(test code = 770-8) 49.9 %                                          

 

                                                    IMM GRAN % (test code 

= 9981444328)   0.20 %                                          

 

                                                    LYMPH % (test code = 

736-9)          38.3 %                                          

 

                                                    MONO % (test code = 

5905-5)         9.2 %                                           

 

                                                    EOS % (test code = 

713-8)          1.7 %                                           

 

                                                    BASO % (test code = 

706-2)          0.7 %                                           

 

                                                    GRAN MAT x10^3(ANC) 

(test code = 

7989058879)     3.00 10*3/uL    1.88-7.09                       

 

                                                    IMM GRAN x10^3 (test 

code = 0403164711) <0.03           0.00-0.06                       

 

                                                    LYMPH x10^3 (test code 

= 731-0)        2.30 10*3/uL    1.32-3.29                       

 

                                                    MONO x10^3 (test code 

= 742-7)        0.55 10*3/uL    0.33-0.92                       

 

                                                    EOS x10^3 (test code = 

711-2)          0.10 10*3/uL    0.03-0.39                       

 

                                                    BASO x10^3 (test code 

= 704-7)        0.04 10*3/uL    0.01-0.07                       

 

                                                    Lab Interpretation 

(test code = 00408-4) Abnormal                                        





[facility] 



                      Test Item  Value      Reference Range Interpretation Comme

nts

 

                                                    NA (test code = 

5984355272)     137 mmol/L      135-145                         

 

                                                    K (test code = 

4692200339)     3.8 mmol/L      3.5-5.0                         

 

                                                    CL (test code = 

8343786615)     105 mmol/L                                

 

                                                    CO2 TOTAL (test code = 

7305355232)     25 mmol/L       23-31                           

 

                                                    AGAP (test code = 

4359544691)                     2-16                            

 

                                                    BUN (test code = 

7214643797)     9 mg/dL         7-23                            

 

                                                    GLUCOSE (test code = 

5290993793)     126 mg/dL                 H               

 

                                                    CREATININE (test code = 

2974412316)     0.76 mg/dL      0.50-1.04                       

 

                                                    TOTAL BILI (test code = 

6951407730)     0.4 mg/dL       0.1-1.1                         

 

                                                    CALCIUM (test code = 

0679095652)     9.1 mg/dL       8.6-10.6                        

 

                                                    T PROTEIN (test code = 

1679273231)     7.5 g/dL        6.3-8.2                         

 

                                                    ALBUMIN (test code = 

2100161440)     4.0 g/dL        3.5-5.0                         

 

                                                    ALK PHOS (test code = 

1795268952)     68 U/L                                    

 

                                                    ALTv (test code = 

1742-6)         13 U/L          5-35                            

 

                                                    AST(SGOT) (test code = 

2944759369)     23 U/L          13-40                           

 

                                                    eGFR (test code = 

0067665841)                     mL/min/1.73m2                   

 

                                        ALFONZO (test code = ALFONZO) Association of 

Glomerular Filtration 

Rate (GFR) and Staging 

of Kidney Disease* 

+---------------------

--+-------------------

--+-------------------

------+| GFR 

(mL/min/1.73 m2) ?| 

With Kidney Damage ?| 

?Without Kidney 

Damage+---------------

--------+-------------

--------+-------------

------------+| ?>90 ? 

? ? ? ? ? ? ? ?| 

?Stage one ? ? ? ? ?| 

? Normal ? ? ? ? ? ? ? 

?+--------------------

---+------------------

---+------------------

-------+| ?60-89 ? ? ? 

? ? ? ? ?| ?Stage two 

? ? ? ? ?| ? Decreased 

GFR ? ? ? ? 

+---------------------

--+-------------------

--+-------------------

------+| ?30-59 ? ? ? 

? ? ? ? ?| ?Stage 

three ? ? ? ?| ? Stage 

three ? ? ? ? ? 

+---------------------

--+-------------------

--+-------------------

------+| ?15-29 ? ? ? 

? ? ? ? ?| ?Stage four 

? ? ? ? | ? Stage four 

? ? ? ? ? 

?+--------------------

---+------------------

---+------------------

-------+| ?<15 (or 

dialysis) ? ?| ?Stage 

five ? ? ? ? | ? Stage 

five ? ? ? ? ? 

?+--------------------

---+------------------

---+------------------

-------+ *Each stage 

assumes the associated 

GFR level has been in 

effect for at least 

three months. ?Stages 

1 to 5, with or 

without kidney 

disease, indicate 

chronic kidney 

disease. Notes: 

Determination of 

stages one and two 

(with eGFR 

>59mL/min/1.73 m2) 

requires estimation of 

kidney damage for at 

least three months as 

defined by structural 

or functional 

abnormalities of the 

kidney, manifested by 

either:Pathological 

abnormalities or 

Markers of kidney 

damage (including 

abnormalities in the 

composition of the 

blood or urine or 

abnormalities in 

imaging tests).                                             

 

                                                    Lab Interpretation 

(test code = 20933-4) Abnormal                                        





[facility]54[facility]38* 



                      Test Item  Value      Reference Range Interpretation Comme

[facility]

 

                      LIPASE (test code = 7563589608) 78 U/L     0-220          

       

 

                                                    Lab Interpretation (test cod

e = 

07546-3)        Normal                                          





[facility] 14:41:38* 



                      Test Item  Value      Reference Range Interpretation Comme

nts

 

                                                    WBC (test code = 

6690-2)                         See_Comment                     [Automated frestyl] 

The system which 

generated this 

result transmitted 

reference range: 

4.30 - 11.10 

10*3/?L. The 

reference range was 

not used to 

interpret this 

result as 

normal/abnormal.

 

                                                    RBC (test code = 

789-8)                          See_Comment                     [Automated frestyl] 

The system which 

generated this 

result transmitted 

reference range: 

3.93 - 5.25 

10*6/?L. The 

reference range was 

not used to 

interpret this 

result as 

normal/abnormal.

 

                                                    HGB (test code = 

718-7)          9.6 g/dL        11.6-15.0       L               

 

                                                    HCT (test code = 

4544-3)         31.4 %          35.7-45.2       L               

 

                                                    MCV (test code = 

787-2)          76.2 fL         80.6-95.5       L               

 

                                                    MCH (test code = 

785-6)          23.3 pg         25.9-32.8       L               

 

                                                    MCHC (test code = 

786-4)          30.6 g/dL       31.6-35.1       L               

 

                                                    RDW-SD (test code = 

03107-8)        46.2 fL         39.0-49.9                       

 

                                                    RDW-CV (test code = 

788-0)          16.8 %          12.0-15.5       H               

 

                                                    PLT (test code = 

777-3)                          See_Comment                     [Automated messa

ge] 

The system which 

generated this 

result transmitted 

reference range: 

166 - 358 10*3/?L. 

The reference range 

was not used to 

interpret this 

result as 

normal/abnormal.

 

                                                    MPV (test code = 

59753-5)        9.7 fL          9.5-12.9                        

 

                                                    NRBC/100 WBC (test 

code = 2484728638)                 See_Comment                     [Automated me

ssage] 

The system which 

generated this 

result transmitted 

reference range: 

0.0 - 10.0 /100 

WBCs. The reference 

range was not used 

to interpret this 

result as 

normal/abnormal.

 

                                                    NRBC x10^3 (test code 

= 1149314200)   <0.01           See_Comment                     [Automated messa

ge] 

The system which 

generated this 

result transmitted 

reference range: 

10*3/?L. The 

reference range was 

not used to 

interpret this 

result as 

normal/abnormal.

 

                                                    GRAN MAT (NEUT) % 

(test code = 770-8) 50.0 %                                          

 

                                                    IMM GRAN % (test code 

= 6469254627)   0.20 %                                          

 

                                                    LYMPH % (test code = 

736-9)          39.3 %                                          

 

                                                    MONO % (test code = 

5905-5)         7.1 %                                           

 

                                                    EOS % (test code = 

713-8)          2.8 %                                           

 

                                                    BASO % (test code = 

706-2)          0.6 %                                           

 

                                                    GRAN MAT x10^3(ANC) 

(test code = 

6742062275)     2.67 10*3/uL    1.88-7.09                       

 

                                                    IMM GRAN x10^3 (test 

code = 8002842797) <0.03           0.00-0.06                       

 

                                                    LYMPH x10^3 (test code 

= 731-0)        2.10 10*3/uL    1.32-3.29                       

 

                                                    MONO x10^3 (test code 

= 742-7)        0.38 10*3/uL    0.33-0.92                       

 

                                                    EOS x10^3 (test code = 

711-2)          0.15 10*3/uL    0.03-0.39                       

 

                                                    BASO x10^3 (test code 

= 704-7)        0.03 10*3/uL    0.01-0.07                       

 

                                                    Lab Interpretation 

(test code = 80737-5) Abnormal                                        





[facility] Pregnancy Test2021-10-29 14:34:00* 



                      Test Item  Value      Reference Range Interpretation Comme

nts

 

                      POCT PREG (test code = 1605) negative                     

    

 

                                                    On board controls acceptable

 with 

C Line (test code = 3574) present                                         

 

                      POCT PREG LOT # (test code = 3575) [id number]             

          

 

                                                    POCT PREG TEST  DATE (

test 

code = 3576)                                                    

 

                                                    Lab Interpretation (test cod

e = 

58942-6)        Normal                                          





[facility]  
23:58:00* 



                      Test Item  Value      Reference Range Interpretation Comme

nts

 

                                                    Streptococcus pyogenes (grou

p A) 

antigen (test code = 44848-8) Positive        Negative        A               

 

                                                    Lab Interpretation (test cod

e = 

01251-3)        Abnormal                                        





[facility] PREGNANCY [id number] 23:01:00* 



                      Test Item  Value      Reference Range Interpretation Comme

nts

 

                      POCT PREG (test code = 1605) negative                     

    

 

                      POCT PREG LOT # (test code = 3575) [id number]             

          

 

                                                    POCT PREG TEST  DATE (

test 

code = 3576)    2022                                      

 

                                                    Lab Interpretation (test cod

e = 

08533-9)        Normal                                          





[facility] [id number] 13:02:18
  * 



                      Test Item  Value      Reference Range Interpretation Comme

nts

 

                                                    IAT (test code = 

1185)           Negative                                        Performed at [facility]

B 

Laboratory Services - [facility] 

Blood [address] [place] [address] 

[address]Thnj Free: 

[phone] No. 

[id number]





[facility] WITH DIFFERENTIAL2019-08-15 16:43:00* 



                      Test Item  Value      Reference Range Interpretation Comme

nts

 

                                                    WBC (test code = 

6690-2)                         See_Comment     L               [Automated messa

ge] 

The system which 

generated this 

result transmitted 

reference range: 

4.30 - 11.10 

10*3/?L. The 

reference range was 

not used to 

interpret this 

result as 

normal/abnormal.

 

                                                    RBC (test code = 

789-8)                          See_Comment                     [Automated messa

ge] 

The system which 

generated this 

result transmitted 

reference range: 

3.93 - 5.25 

10*6/?L. The 

reference range was 

not used to 

interpret this 

result as 

normal/abnormal.

 

                                                    HGB (test code = 

718-7)          10.6 g/dL       11.6-15         L               

 

                                                    HCT (test code = 

4544-3)         36.1 %          35.7-45.2                       

 

                                                    MCV (test code = 

787-2)          78.5 fL         80.6-95.5       L               

 

                                                    MCH (test code = 

785-6)          23.0 pg         25.9-32.8       L               

 

                                                    MCHC (test code = 

786-4)          29.4 g/dL       31.6-35.1       L               

 

                                                    RDW-SD (test code = 

61630-9)        54.3 fL         39-49.9         H               

 

                                                    RDW-CV (test code = 

788-0)          19.1 %          12-15.5         H               

 

                                                    PLT (test code = 

777-3)                          See_Comment                     [Automated messa

ge] 

The system which 

generated this 

result transmitted 

reference range: 

166 - 358 10*3/?L. 

The reference range 

was not used to 

interpret this 

result as 

normal/abnormal.

 

                                                    MPV (test code = 

78090-4)        11.1 fL         9.5-12.9                        

 

                                                    NRBC/100 WBC (test 

code = 1889110509)                 See_Comment                     [Automated me

ssage] 

The system which 

generated this 

result transmitted 

reference range: 

0.0 - 10.0 /100 

WBCs. The reference 

range was not used 

to interpret this 

result as 

normal/abnormal.

 

                                                    NRBC x10^3 (test code 

= 8881609503)   <0.01           See_Comment                     [Automated messa

ge] 

The system which 

generated this 

result transmitted 

reference range: 

10*3/?L. The 

reference range was 

not used to 

interpret this 

result as 

normal/abnormal.

 

                                                    GRAN MAT (NEUT) % 

(test code = 770-8) 39.5 %                                          

 

                                                    IMM GRAN % (test code 

= 2196645499)   0.00 %                                          

 

                                                    LYMPH % (test code = 

736-9)          48.6 %                                          

 

                                                    MONO % (test code = 

5905-5)         7.7 %                                           

 

                                                    EOS % (test code = 

713-8)          2.9 %                                           

 

                                                    BASO % (test code = 

706-2)          1.3 %                                           

 

                                                    GRAN MAT x10^3(ANC) 

(test code = 

0970959728)     1.23 10*3/uL    1.88-7.09       L               

 

                                                    IMM GRAN x10^3 (test 

code = 5734291223) <0.03           0-0.06                          

 

                                                    LYMPH x10^3 (test code 

= 731-0)        1.51 10*3/uL    1.32-3.29                       

 

                                                    MONO x10^3 (test code 

= 742-7)        0.24 10*3/uL    0.33-0.92       L               

 

                                                    EOS x10^3 (test code = 

711-2)          0.09 10*3/uL    0.03-0.39                       

 

                                                    BASO x10^3 (test code 

= 704-7)        0.04 10*3/uL    0.01-0.07                       

 

                                                    ELLIPTO/OVAL (test 

code = 48308-5) 2+              See_Comment     A               [Automated messa

ge] 

The system which 

generated this 

result transmitted 

reference range: 

(none). The 

reference range was 

not used to 

interpret this 

result as 

normal/abnormal.

 

                                                    Lab Interpretation 

(test code = 48510-1) Abnormal                                        





[facility] Center[facility] - The Type and Screen expires 
at midnight on the 3rd day after it was drawn. A current Type and Screen is 
required when RBCs are requested. For all other blood products, a Type and Scr
een performed during the current hospitalizati...2019-08-15 16:35:07* 



                      Test Item  Value      Reference Range Interpretation Comme

nts

 

                                                    ABO & RH (test code 

= 20)           O POSITIVE                                      Performed at [facility] 

Laboratory Services - 

[facility] Blood [address] 

[address]Jmgl Free: 

[phone] No. 

[id number]

 

                                                    IAT (test code = 

1185)           Negative                                        Performed at [facility] 

Laboratory Services - 

[facility] Blood [address] 

[address]Avoq Free: 

[phone] No. 

[id number]





[facility] CenterGAL[facility] ONLY - SYPHILIS IGG/[id number] 
14:50:00* 



                      Test Item  Value      Reference Range Interpretation Comme

nts

 

                                                    Syphilis IgG/IgM (test 

code = 95718-7) Non-reactive    Non-reactive                    

 

                                        ALFONZO (test code = ALFONZO) Non-reactive - No 

serologic evidence 

of T. pallidum 

infection. Cannot 

exclude incubating 

or early syphilis. 

Submit a second 

specimen in 2-4 

weeks if syphilis is 

clinically 

suspected.Equivocal 

- Further testing to 

follow.Reactive - 

Further testing to 

follow.                                                     

 

                                                    Lab Interpretation (test 

code = 58243-4) Normal                                          





[facility] WITH [id number] 07:57:00* 



                      Test Item  Value      Reference Range Interpretation Comme

nts

 

                                                    WBC (test code = 

6690-2)                         See_Comment                     [Automated messa

ge] 

The system which 

generated this 

result transmitted 

reference range: 

4.30 - 11.10 

10*3/?L. The 

reference range was 

not used to 

interpret this 

result as 

normal/abnormal.

 

                                                    RBC (test code = 

789-8)                          See_Comment     L               [Automated messa

ge] 

The system which 

generated this 

result transmitted 

reference range: 

3.93 - 5.25 

10*6/?L. The 

reference range was 

not used to 

interpret this 

result as 

normal/abnormal.

 

                                                    HGB (test code = 

718-7)          7.9 g/dL        11.6-15         L               

 

                                                    HCT (test code = 

4544-3)         27.1 %          35.7-45.2       L               

 

                                                    MCV (test code = 

787-2)          79.9 fL         80.6-95.5       L               

 

                                                    MCH (test code = 

785-6)          23.3 pg         25.9-32.8       L               

 

                                                    MCHC (test code = 

786-4)          29.2 g/dL       31.6-35.1       L               

 

                                                    RDW-SD (test code = 

13011-7)        56.2 fL         39-49.9         H               

 

                                                    RDW-CV (test code = 

788-0)          19.7 %          12-15.5         H               

 

                                                    PLT (test code = 

777-3)                          See_Comment     L               [Automated messa

ge] 

The system which 

generated this 

result transmitted 

reference range: 

166 - 358 10*3/?L. 

The reference range 

was not used to 

interpret this 

result as 

normal/abnormal.

 

                                                    MPV (test code = 

45009-8)        10.3 fL         9.5-12.9                        

 

                                                    NRBC/100 WBC (test 

code = 1843200044)                 See_Comment                     [Automated me

ssage] 

The system which 

generated this 

result transmitted 

reference range: 

0.0 - 10.0 /100 

WBCs. The reference 

range was not used 

to interpret this 

result as 

normal/abnormal.

 

                                                    NRBC x10^3 (test code 

= 1758596508)   <0.01           See_Comment                     [Automated messa

ge] 

The system which 

generated this 

result transmitted 

reference range: 

10*3/?L. The 

reference range was 

not used to 

interpret this 

result as 

normal/abnormal.

 

                                                    GRAN MAT (NEUT) % 

(test code = 770-8) 76.9 %                                          

 

                                                    IMM GRAN % (test code 

= 8719992972)   0.60 %                                          

 

                                                    LYMPH % (test code = 

736-9)          14.2 %                                          

 

                                                    MONO % (test code = 

5905-5)         7.6 %                                           

 

                                                    EOS % (test code = 

713-8)          0.3 %                                           

 

                                                    BASO % (test code = 

706-2)          0.4 %                                           

 

                                                    GRAN MAT x10^3(ANC) 

(test code = 

9458183888)     5.96 10*3/uL    1.88-7.09                       

 

                                                    IMM GRAN x10^3 (test 

code = 4263662888) 0.05 10*3/uL    0-0.06                          

 

                                                    LYMPH x10^3 (test code 

= 731-0)        1.10 10*3/uL    1.32-3.29       L               

 

                                                    MONO x10^3 (test code 

= 742-7)        0.59 10*3/uL    0.33-0.92                       

 

                                                    EOS x10^3 (test code = 

711-2)          <0.03           0.03-0.39       L               

 

                                                    BASO x10^3 (test code 

= 704-7)        0.03 10*3/uL    0.01-0.07                       

 

                                                    Lab Interpretation 

(test code = 40191-4) Abnormal                                        





[facility] BranchARTERIAL CORD [id number] 03:33:00* 



                      Test Item  Value      Reference Range Interpretation Comme

nts

 

                                                    BASE EXCESS, CORD 

(test code = 

8442233769)                     mEq/L                           

 

                                                    AC PH, CORD (BEAKER) 

(test code = 

4820873903)                     7.18-7.38                       

 

                                                    PC02, CORD (test code 

= 3622743909)                   See_Comment                     [Automated messa

ge] The 

system which generated this 

result transmitted 

reference range: 32 - 66 

mmHg. The reference range 

was not used to interpret 

this result as 

normal/abnormal.

 

                                                    PO2, CORD (test code 

= 8785436386)                   See_Comment                     [Automated messa

ge] The 

system which generated this 

result transmitted 

reference range: 10 - 30 

mmHg. The reference range 

was not used to interpret 

this result as 

normal/abnormal.

 

                                                    BICARBONATE, CORD 

(test code = 

2936887535)                     See_Comment                     [Automated messa

ge] The 

system which generated this 

result transmitted 

reference range: 17 - 27 

mEq/L. The reference range 

was not used to interpret 

this result as 

normal/abnormal.





[facility] CenterVEN[facility]ORD [id number] 03:31:00* 



                      Test Item  Value      Reference Range Interpretation Comme

nts

 

                                                    VENOUS BASE EXCESS, 

CORD (test code = 

8659155664)                     mEq/L                           

 

                                                    VENOUS PH, CORD (test 

code = 6338586854)                 7.25-7.45                       

 

                                                    VENOUS PC02, CORD 

(test code = 

1435476187)                     See_Comment                     [Automated messa

ge] The 

system which generated 

this result transmitted 

reference range: 27 - 49 

mmHg. The reference range 

was not used to interpret 

this result as 

normal/abnormal.

 

                                                    VENOUS PO2, CORD (test 

code = 0383065655)                 See_Comment                     [Automated me

ssage] The 

system which generated 

this result transmitted 

reference range: 17 - 41 

mmHg. The reference range 

was not used to interpret 

this result as 

normal/abnormal.

 

                                                    VENOUS BICARBONATE, 

CORD (test code = 

9417038387)                     See_Comment                     [Automated messa

ge] The 

system which generated 

this result transmitted 

reference range: 12 - 29 

mEq/L. The reference range 

was not used to interpret 

this result as 

normal/abnormal.





[facility] CenterUrinalysis2019-[facility] 03:13:00* 



                      Test Item  Value      Reference Range Interpretation Comme

nts

 

                                                    APPEARANCE (test code = 

9767667672)     Hazy            Clear           A               

 

                                                    COLOR (test code = 

0012604399)     Yellow          Yellow                          

 

                                                    PH (test code = 

1607572047)                     4.8-8.0                         

 

                                                    SP GRAVITY (test code = 

9949274071)                     1.003-1.030                     

 

                                                    GLU U QUAL (test code = 

1850306774)     Normal          Normal                          

 

                                                    BLOOD (test code = 

7235708881)     1+              Negative        A               

 

                                                    KETONES (test code = 

7647472879)     80 mg/dL        Negative        A               

 

                                                    PROTEIN (test code = 

2887-8)         30 mg/dL        Negative        A               

 

                                                    UROBILIN (test code = 

1974983882)     4.0 mg/dL       Normal          A               

 

                                                    BILIRUBIN (test code = 

8897215992)     Negative        Negative                        

 

                                                    NITRITE (test code = 

4155901894)     Negative        Negative                        

 

                                                    LEUK SALLY (test code = 

2020857663)     250/uL          Negative        A               

 

                                                    RBC/HPF (test code = 

9369600600)                     See_Comment     H               [Automated messa

ge] 

The system which 

generated this 

result transmitted 

reference range: 0 - 

3 HPF. The reference 

range was not used 

to interpret this 

result as 

normal/abnormal.

 

                                                    WBC/HPF (test code = 

7818430428)                     See_Comment     H               [Automated messa

ge] 

The system which 

generated this 

result transmitted 

reference range: 0 - 

5 HPF. The reference 

range was not used 

to interpret this 

result as 

normal/abnormal.

 

                                                    BACTERIA (test code = 

8837648400)     Negative        Negative                        

 

                                                    MUCOUS (test code = 

6829164715)     Slight          Negative LPF    A               

 

                                                    SQ EPITH (test code = 

0787139932)                     See_Comment                     [Automated messa

ge] 

The system which 

generated this 

result transmitted 

reference range: <=2 

HPF. The reference 

range was not used 

to interpret this 

result as 

normal/abnormal.

 

                                                    YEAST BUD (test code = 

0978971440)     <1              See_Comment                     [Automated messa

ge] 

The system which 

generated this 

result transmitted 

reference range: <=1 

HPF. The reference 

range was not used 

to interpret this 

result as 

normal/abnormal.

 

                                                    Lab Interpretation (test 

code = 49867-2) Abnormal                                        





[facility] CenterUric Acid [id number] 02:58:00* 



                      Test Item  Value      Reference Range Interpretation Comme

[facility]

 

                      URIC ACID (test code = 5671147861) 4.9 mg/dL  2.9-6       

          

 

                                                    Lab Interpretation (test cod

e = 

87207-6)        Normal                                          





[facility] CenterSer [id number] 02:58:00* 



                      Test Item  Value      Reference Range Interpretation Comme

nts

 

                                                    CREATININE (test code 

= 0105035820)   0.54 mg/dL      0.5-1.04                        

 

                                                    eGFR Calculation 

(Non-) 

(test code = 

1343828740)                     mL/min/1.73m2                   

 

                                                    eGFR Calculation 

() 

(test code = 

7419381425)                     mL/min/1.73m2                   

 

                                        ALFONZO (test code = ALFONZO) Association of 

Glomerular Filtration 

Rate (GFR) and Staging 

of Kidney 

Disease*+--------------

---------+-------------

--------+--------------

-----------+| GFR 

(mL/min/1.73 m2)?| With 

Kidney Damage?|?Without 

Kidney 

Damage+----------------

-------+---------------

------+----------------

---------+|?>90?|?Stage 

one?|? 

Normal?+---------------

--------+--------------

-------+---------------

----------+|?60-89?|?St

age two?|? Decreased 

GFR? 

+----------------------

-+---------------------

+----------------------

---+|?30-59?|?Stage 

three?|? Stage three? 

+----------------------

-+---------------------

+----------------------

---+|?15-29?|?Stage 

four? |? Stage 

four?+-----------------

------+----------------

-----+-----------------

--------+|?<15 (or 

dialysis)?|?Stage five? 

|? Stage 

five?+-----------------

------+----------------

-----+-----------------

--------+*Each stage 

assumes the associated 

GFR level has been in 

effect for at least 

three months.?Stages 1 

to 5, with or without 

kidney disease, 

indicate chronic kidney 

disease.Notes: 

Determination of stages 

one and two (with eGFR 

>59mL/min/1.73 m2) 

requires estimation of 

kidney damage for at 

least three months as 

defined by structural 

or functional 

abnormalities of the 

kidney, manifested by 

either:Pathological 

abnormalities or 

Markers of kidney 

damage (including 

abnormalities in the 

composition of the 

blood or urine or 

abnormalities in 

imaging tests).                                             





[facility] 
02:58:00* 



                      Test Item  Value      Reference Range Interpretation Comme

[facility]

 

                      AST(SGOT) (test code = 5984122632) 28 U/L     13-40       

          

 

                                                    Lab Interpretation (test cod

e = 

26058-1)        Normal                                          





[facility] 
02:58:00* 



                      Test Item  Value      Reference Range Interpretation Comme

[facility]

 

                      ALT(SGPT) (test code = 5980535672) 22 U/L     9-51        

          

 

                                                    Lab Interpretation (test cod

e = 

31146-3)        Normal                                          





[facility] Center[facility] [id number] 02:58:00* 



                      Test Item  Value      Reference Range Interpretation Comme

[facility]

 

                      LDH (test code = 5311743515) 597 U/L    300-600           

    

 

                                                    Lab Interpretation (test cod

e = 

08406-4)        Normal                                          





[facility] Center[facility] Urine [id number] 
02:24:00* 



                      Test Item  Value      Reference Range Interpretation Comme

nts

 

                      T. PROT U (test code = 2888-6) 31 mg/dL                   

      

 

                      CREAT U (test code = 5499976862) 132.3 mg/dL              

         

 

                                                    Protein/Creatinine Ratio Uri

ne 

(test code = 1249860806)                 0.0-2.0                         





[facility] WITH [id number] 02:17:00* 



                      Test Item  Value      Reference Range Interpretation Comme

nts

 

                                                    WBC (test code = 

6690-2)                         See_Comment                     [Automated messa

ge] 

The system which 

generated this 

result transmitted 

reference range: 

4.30 - 11.10 

10*3/?L. The 

reference range was 

not used to 

interpret this 

result as 

normal/abnormal.

 

                                                    RBC (test code = 

789-8)                          See_Comment                     [Automated messa

ge] 

The system which 

generated this 

result transmitted 

reference range: 

3.93 - 5.25 

10*6/?L. The 

reference range was 

not used to 

interpret this 

result as 

normal/abnormal.

 

                                                    HGB (test code = 

718-7)          9.4 g/dL        11.6-15         L               

 

                                                    HCT (test code = 

4544-3)         33.1 %          35.7-45.2       L               

 

                                                    MCV (test code = 

787-2)          81.1 fL         80.6-95.5                       

 

                                                    MCH (test code = 

785-6)          23.0 pg         25.9-32.8       L               

 

                                                    MCHC (test code = 

786-4)          28.4 g/dL       31.6-35.1       L               

 

                                                    RDW-SD (test code = 

39450-2)        57.6 fL         39-49.9         H               

 

                                                    RDW-CV (test code = 

788-0)          20.3 %          12-15.5         H               

 

                                                    PLT (test code = 

777-3)                          See_Comment     L               [Automated messa

ge] 

The system which 

generated this 

result transmitted 

reference range: 

166 - 358 10*3/?L. 

The reference range 

was not used to 

interpret this 

result as 

normal/abnormal.

 

                                                    MPV (test code = 

94983-7)        10.8 fL         9.5-12.9                        

 

                                                    NRBC/100 WBC (test 

code = 7532212026)                 See_Comment                     [Automated me

ssage] 

The system which 

generated this 

result transmitted 

reference range: 

0.0 - 10.0 /100 

WBCs. The reference 

range was not used 

to interpret this 

result as 

normal/abnormal.

 

                                                    NRBC x10^3 (test code 

= 0694737529)   <0.01           See_Comment                     [Automated messa

ge] 

The system which 

generated this 

result transmitted 

reference range: 

10*3/?L. The 

reference range was 

not used to 

interpret this 

result as 

normal/abnormal.

 

                                                    GRAN MAT (NEUT) % 

(test code = 770-8) 77.0 %                                          

 

                                                    IMM GRAN % (test code 

= 4367368562)   0.30 %                                          

 

                                                    LYMPH % (test code = 

736-9)          14.7 %                                          

 

                                                    MONO % (test code = 

5905-5)         7.2 %                                           

 

                                                    EOS % (test code = 

713-8)          0.3 %                                           

 

                                                    BASO % (test code = 

706-2)          0.5 %                                           

 

                                                    GRAN MAT x10^3(ANC) 

(test code = 

8525704910)     6.70 10*3/uL    1.88-7.09                       

 

                                                    IMM GRAN x10^3 (test 

code = 0278905819) 0.03 10*3/uL    0-0.06                          

 

                                                    LYMPH x10^3 (test code 

= 731-0)        1.28 10*3/uL    1.32-3.29       L               

 

                                                    MONO x10^3 (test code 

= 742-7)        0.63 10*3/uL    0.33-0.92                       

 

                                                    EOS x10^3 (test code = 

711-2)          0.03 10*3/uL    0.03-0.39                       

 

                                                    BASO x10^3 (test code 

= 704-7)        0.04 10*3/uL    0.01-0.07                       

 

                                                    Lab Interpretation 

(test code = 95123-1) Abnormal                                        





[facility] CenterHepatitis B Surface [id number] 17:46:00
  * 



                      Test Item  Value      Reference Range Interpretation Comme

nts

 

                                                    HBsAg Semi-Quantitative (ozzy

t code = 

5195-3)                                                         





[facility] CenterType and Screen - ONCE [id number] 17:23:20
  * 



                      Test Item  Value      Reference Range Interpretation Comme

nts

 

                                                    ABO & RH (test code 

= 20)           O POSITIVE                                      Performed at [facility] 

Laboratory Services [facility] Blood [address] 

[address] Free: 

[phone] No. 

[id number]

 

                                                    IAT (test code = 

1185)           Negative                                        Performed at [facility] 

Laboratory Services [facility] Blood [address] 

[address] Free: 

[phone] No. 

[id number]





[facility] CenterCBC WITH [id number] 16:41:00* 



                      Test Item  Value      Reference Range Interpretation Comme

nts

 

                                                    WBC (test code = 

6690-2)                         See_Comment                     [Automated messa

ge] 

The system which 

generated this 

result transmitted 

reference range: 

4.30 - 11.10 

10*3/?L. The 

reference range was 

not used to 

interpret this 

result as 

normal/abnormal.

 

                                                    RBC (test code = 

789-8)                          See_Comment     L               [Automated messa

ge] 

The system which 

generated this 

result transmitted 

reference range: 

3.93 - 5.25 

10*6/?L. The 

reference range was 

not used to 

interpret this 

result as 

normal/abnormal.

 

                                                    HGB (test code = 

718-7)          8.3 g/dL        11.6-15         L               

 

                                                    HCT (test code = 

4544-3)         27.8 %          35.7-45.2       L               

 

                                                    MCV (test code = 

787-2)          78.8 fL         80.6-95.5       L               

 

                                                    MCH (test code = 

785-6)          23.5 pg         25.9-32.8       L               

 

                                                    MCHC (test code = 

786-4)          29.9 g/dL       31.6-35.1       L               

 

                                                    RDW-SD (test code = 

34845-6)        55.9 fL         39-49.9         H               

 

                                                    RDW-CV (test code = 

788-0)          19.9 %          12-15.5         H               

 

                                                    PLT (test code = 

777-3)                          See_Comment     L               [Automated messa

ge] 

The system which 

generated this 

result transmitted 

reference range: 

166 - 358 10*3/?L. 

The reference range 

was not used to 

interpret this 

result as 

normal/abnormal.

 

                                                    MPV (test code = 

23446-1)        10.4 fL         9.5-12.9                        

 

                                                    NRBC/100 WBC (test 

code = 6533785008)                 See_Comment                     [Automated me

ssage] 

The system which 

generated this 

result transmitted 

reference range: 

0.0 - 10.0 /100 

WBCs. The reference 

range was not used 

to interpret this 

result as 

normal/abnormal.

 

                                                    NRBC x10^3 (test code 

= 3518005559)   <0.01           See_Comment                     [Automated messa

ge] 

The system which 

generated this 

result transmitted 

reference range: 

10*3/?L. The 

reference range was 

not used to 

interpret this 

result as 

normal/abnormal.

 

                                                    GRAN MAT (NEUT) % 

(test code = 770-8) 65.9 %                                          

 

                                                    IMM GRAN % (test code 

= 1993531511)   0.50 %                                          

 

                                                    LYMPH % (test code = 

736-9)          24.3 %                                          

 

                                                    MONO % (test code = 

5905-5)         8.1 %                                           

 

                                                    EOS % (test code = 

713-8)          0.9 %                                           

 

                                                    BASO % (test code = 

706-2)          0.3 %                                           

 

                                                    GRAN MAT x10^3(ANC) 

(test code = 

5996912369)     3.83 10*3/uL    1.88-7.09                       

 

                                                    IMM GRAN x10^3 (test 

code = 4903779088) 0.03 10*3/uL    0-0.06                          

 

                                                    LYMPH x10^3 (test code 

= 731-0)        1.41 10*3/uL    1.32-3.29                       

 

                                                    MONO x10^3 (test code 

= 742-7)        0.47 10*3/uL    0.33-0.92                       

 

                                                    EOS x10^3 (test code = 

711-2)          0.05 10*3/uL    0.03-0.39                       

 

                                                    BASO x10^3 (test code 

= 704-7)        <0.03           0.01-0.07                       

 

                                                    Lab Interpretation 

(test code = 10468-8) Abnormal                                        





[facility]

## 2025-02-13 NOTE — ER
Nurse's Notes                                                                                     

 [facility]                                                                 

Name: Lorena Rice                                                                                

Age: 29 yrs                                                                                       

Sex: Female                                                                                       

: 1995                                                                                   

MRN: [id number]                                                                                   

Arrival Date: 2025                                                                          

Time: 22:10                                                                                       

Account#: [id number]                                                                            

Bed DX3                                                                                           

Private MD:                                                                                       

Diagnosis: Viral infection, unspecified                                                           

                                                                                                  

Presentation:                                                                                     

                                                                                             

22:32 Chief complaint: Patient states: body aches, diarrhea, stuffy nose, cough, frequent     iw  

      urination, head pressure , symptoms started Tuesday. Coronavirus screen: Client             

      presents with at least one sign or symptom that may indicate coronavirus-19. Ebola          

      Screen: No symptoms or risks identified at this time. Initial Sepsis Screen: Does the       

      patient meet any 2 criteria? No. Patient's initial sepsis screen is negative. Does the      

      patient have a suspected source of infection? No. Patient's initial sepsis screen is        

      negative. Risk Assessment: Do you want to hurt yourself or someone else? Patient            

      reports no desire to harm self or others. Onset of symptoms was 2025.          

22:32 Method Of Arrival: Ambulatory                                                           iw  

22:32 Acuity: VANESSA 4                                                                           iw  

                                                                                                  

Triage Assessment:                                                                                

23:57 General: Appears uncomfortable, Behavior is cooperative. Pain: Complains of pain in     ha1 

      BODY ACHES. Neuro: Level of Consciousness is awake, alert, obeys commands, Oriented to      

      person, place, time, situation. Cardiovascular: Patient's skin is warm and dry.             

      Respiratory: Airway is patent Respiratory effort is even, unlabored, Respiratory            

      pattern is regular, symmetrical.                                                            

                                                                                                  

Historical:                                                                                       

- Allergies:                                                                                      

22:33 No Known Allergies;                                                                     iw  

- PMHx:                                                                                           

22:33 Anemia;                                                                                 iw  

- PSHx:                                                                                           

22:33  section; Cholecystectomy; tubal ligation;                                      iw  

                                                                                                  

- Immunization history:: Adult Immunizations not up to date.                                      

- Infectious Disease History:: Denies.                                                            

- Social history:: Smoking status: Reported history of juuling and/or vaping.                     

                                                                                                  

                                                                                                  

Screenin:56 [facility] ED Fall Risk Assessment (Adult) History of falling in the last 3 months,       ha1 

      including since admission No falls in past 3 months (0 pts) Confusion or Disorientation     

      No (0 pts) Intoxicated or Sedated No (0 pts) Impaired Gait No (0 pts) Mobility Assist       

      Device Used No (0 pt) Altered Elimination No (0 pt) Score/Fall Risk Level 0 - 2 = Low       

      Risk Oriented to surroundings, Maintained a safe environment, Educated pt \T\ family on     

      fall prevention, incl call for assistance when getting out of bed, Hourly rounding          

      (assess needs \T\ fall precautionary measures) done. Abuse screen: Denies threats or        

      abuse. Denies injuries from another. Nutritional screening: No deficits noted.              

      Tuberculosis screening: No symptoms or risk factors identified.                             

                                                                                                  

Assessment:                                                                                       

23:55 Reassessment: Patient and/or family updated on plan of care and expected duration. Pain ha1 

      level reassessed. Patient is alert, oriented x 3, equal unlabored respirations, skin        

      warm/dry/pink.                                                                              

                                                                                                  

Vital Signs:                                                                                      

22:32  / 96; Pulse 75; Resp 16; Temp 97.6; Pulse Ox 100% on R/A; Weight 105.23 kg;      iw  

      Height 5 ft. 8 in. ; Pain 7/10;                                                             

23:55  / 91; Pulse 69; Resp 17 S; Pulse Ox 100% on R/A;                                 ha1 

22:32 Body Mass Index 35.28 (105.23 kg, 172.72 cm)                                            iw  

22:32 Pain Scale: Adult                                                                       iw  

                                                                                                  

ED Course:                                                                                        

22:11 Patient arrived in ED.                                                                  jj6 

22:12 Camila Landers PA-C is PHCP.                                                            sb4 

22:12 Maxwell Carbajal MD is Attending Physician.                                           sb4 

22:33 Triage completed.                                                                       iw  

22:34 Arm band placed on.                                                                     iw  

23:00 Pregnancy Test, Urine Sent.                                                             ha1 

23:00 UAM Sent.                                                                               ha1 

23:20 Patient has correct armband on for positive identification. Bed in low position. Call   ha1 

      light in reach. Side rails up X 1.                                                          

23:20 Provided Education on: PLAN OF CARE .                                                   ha1 

23:24 Chest Pa And Lat (2 Views) XRAY In Process Unspecified.                                 EDMS

23:58 No provider procedures requiring assistance completed. Patient did not have IV access   ha1 

      during this emergency room visit.                                                           

                                                                                                  

Administered Medications:                                                                         

23:42 Drug: predniSONE PO 40 mg PO once Route: PO;                                            ha1 

23:54 Follow up: Response: No adverse reaction                                                ha1 

                                                                                                  

                                                                                                  

Medication:                                                                                       

23:56 VIS not applicable for this client.                                                     ha1 

                                                                                                  

Outcome:                                                                                          

23:39 Discharge ordered by MD.                                                                sb4 

23:58 Discharged to home ambulatory,                                                          ha1 

23:58 Condition: stable                                                                           

23:58 Discharge instructions given to patient, Instructed on discharge instructions, follow       

      up and referral plans. medication usage, Demonstrated understanding of instructions,        

      follow-up care, medications, Prescriptions given X ,                                       

23:59 Patient left the ED.                                                                    ha1 

                                                                                                  

Signatures:                                                                                       

Dispatcher [employer]                           Lona Cheney, RN                     Sandie Munson                           jj6                                                  

Sally Vega RN RN   ha1                                                  

Camila Landers, PAABBIE                     PAABBIE sb4                                                  

                                                                                                  

**************************************************************************************************

## 2025-02-13 NOTE — EDPHYS
Physician Documentation                                                                           

 [facility]                                                                 

Name: Lorena Rice                                                                                

Age: 29 yrs                                                                                       

Sex: Female                                                                                       

: 1995                                                                                   

MRN: [id number]                                                                                   

Arrival Date: 2025                                                                          

Time: 22:10                                                                                       

Account#: [id number]                                                                            

Bed DX3                                                                                           

Private MD:                                                                                       

ED Physician Maxwell Carbajal                                                                    

HPI:                                                                                              

                                                                                             

23:07 This 29 yrs old Black Female presents to ER via Ambulatory with complaints of Flu       sb4 

      Symptoms.                                                                                   

23:07 cough, congestion, body aches, nausea, frequent urination x 2 days. states family       sb4 

      members are sick with similar symptoms. her work wanted her to be evaluated before she      

      could return.                                                                               

                                                                                                  

Historical:                                                                                       

- Allergies:                                                                                      

22:33 No Known Allergies;                                                                     iw  

- PMHx:                                                                                           

22:33 Anemia;                                                                                 iw  

- PSHx:                                                                                           

22:33  section; Cholecystectomy; tubal ligation;                                      iw  

                                                                                                  

- Immunization history:: Adult Immunizations not up to date.                                      

- Infectious Disease History:: Denies.                                                            

- Social history:: Smoking status: Reported history of juuling and/or vaping.                     

                                                                                                  

                                                                                                  

ROS:                                                                                              

23:07 Cardiovascular: Negative for chest pain, palpitations, and edema,                       sb4 

23:07 Constitutional: Positive for body aches, chills, fatigue,                                   

23:07 ENT: Positive for sinus congestion,                                                         

23:07 Respiratory: Positive for cough,                                                            

23:07 Abdomen/GI: Positive for nausea,                                                            

23:07 : Positive for frequent urination,                                                        

23:07 All other systems are negative,                                                             

                                                                                                  

Exam:                                                                                             

23:07 Head/Face:  Normocephalic, atraumatic. Eyes:  Extra-ocular motions intact.  Periorbital sb4 

      areas with no swelling, redness, or edema. ENT:  Mucous membranes moist.                    

      Cardiovascular:  Regular rate and rhythm with a normal S1 and S2. Respiratory:  No          

      increased work of breathing, no retractions or nasal flaring. Abdomen/GI:  Soft,            

      non-tender, no distension. Skin:  Warm, dry with normal turgor.  Normal color with no       

      rashes, no lesions, and no evidence of cellulitis. MS/ Extremity:  Pulses equal, no         

      cyanosis.  Neurovascular intact.  Full, normal range of motion.                             

23:07 Constitutional: The patient appears alert, awake, obviously ill,                            

23:07 ENT: TM's: are normal, Posterior pharynx: is normal,                                        

                                                                                                  

Vital Signs:                                                                                      

22:32  / 96; Pulse 75; Resp 16; Temp 97.6; Pulse Ox 100% on R/A; Weight 105.23 kg;      iw  

      Height 5 ft. 8 in. ; Pain 7/10;                                                             

23:55  / 91; Pulse 69; Resp 17 S; Pulse Ox 100% on R/A;                                 ha1 

22:32 Body Mass Index 35.28 (105.23 kg, 172.72 cm)                                            iw  

22:32 Pain Scale: Adult                                                                       iw  

                                                                                                  

MDM:                                                                                              

22:12 Medical Screening Exam initiated                                                        sb4 

23:49 Data reviewed: vital signs, nurses notes, lab test result(s), radiologic studies, and   sb4 

      as a result, I will discharge patient. Counseling: I had a detailed discussion with the     

      patient and/or guardian regarding the historical points, exam findings, and any             

      diagnostic results supporting the discharge/admit diagnosis, lab results, radiology         

      results, the need for outpatient follow up, for definitive care, to return to the           

      emergency department if symptoms worsen or persist or if there are any questions or         

      concerns that arise at home.                                                                

                                                                                                  

                                                                                             

22:42 Order name: SARS RAPID; Complete Time: 23:29                                            sb4 

                                                                                             

22:42 Order name: Flu; Complete Time: 23:39                                                   sb4 

                                                                                             

22:42 Order name: Strep; Complete Time: 23:29                                                 sb4 

                                                                                             

22:42 Order name: UAM; Complete Time: 23:23                                                   sb4 

                                                                                             

22:42 Order name: Pregnancy Test, Urine; Complete Time: 23:23                                 sb4 

                                                                                             

23:29 Order name: Throat Culture                                                              EDMS

                                                                                             

22:42 Order name: Chest Pa And Lat (2 Views) XRAY                                             sb4 

                                                                                                  

Administered Medications:                                                                         

23:42 Drug: predniSONE PO 40 mg PO once Route: PO;                                            ha1 

23:54 Follow up: Response: No adverse reaction                                                ha1 

                                                                                                  

                                                                                                  

Disposition Summary:                                                                              

25 23:39                                                                                    

Discharge Ordered                                                                                 

 Notes:       Location: Home                                                                        
  sb4

      Problem: new                                                                            sb4 

      Symptoms: are unchanged                                                                 sb4 

      Condition: Stable                                                                       sb4 

      Diagnosis                                                                                   

        - Viral infection, unspecified                                                        sb4 

      Followup:                                                                               sb4 

        - With: Emergency Department                                                               

        - When: As needed                                                                          

        - Reason: Trouble breathing, Worsening of condition                                        

      Discharge Instructions:                                                                     

        - Discharge Summary Sheet                                                             sb4 

        - Viral Respiratory Infection, Easy-To-Read                                           sb4 

        - Viral Illness, Adult                                                                sb4 

      Forms:                                                                                      

        - Work release form                                                                   sb4 

        - Patient Portal Instructions                                                         sb4 

        - Leadership Thank You Letter                                                         sb4 

      Prescriptions:                                                                              

        - Medrol (Yeyo) 4 mg Oral Tablets, Dose Pack                                                

            - take 1 tablet ORAL route as directed - follow package instructions; 1 packet;   sb4 

      Refills: 0, Product Selection Permitted                                                     

Addendum:                                                                                         

02/15/2025                                                                                        

     00:55 Co-signature as Attending Physician, Maxwell Carbajal MD I agree with the assessment    s
p4

           and plan of care. I reviewed the patient's care provided by the [facility]      

           Provider and agree with the diagnosis and treatment plan.                              

                                                                                                  

Signatures:                                                                                       

Dispatcher [employer]                           Lona Cheney, RN                     Sally Horne RN                        RN   ha1                                                  

Camila Landers, PA-C                     PA-C sb4                                                  

Solomon, MD KI Arreola   sp4                                                  

                                                                                                  

Corrections: (The following items were deleted from the chart)                                    

                                                                                             

22:43 22:43 SARS-COV-2 Antigen Rapid+I.LAB.BRZ ordered. EDMS                                  EDMS

22:43 22:43 Influenza Screen (A \T\ B)+BA.LAB.BRZ ordered. EDMS                                 EDMS

22:43 22:43 Group A Streptococcus Rapid Sc+BA.LAB.BRZ ordered. EDMS                           EDMS

22:43 22:43 Urinalysis W/Microscopic+U.LAB.BRZ ordered. EDMS                                  EDMS

22:43 22:43 Pregnancy Test, Urine+UC.LAB.BRZ ordered. EDMS                                    EDMS

22:43 22:43 Chest Pa And Lat (2 Views)+RAD.RAD.BRZ ordered. EDMS                              EDMS

                                                                                                  

**************************************************************************************************

## 2025-02-14 VITALS — SYSTOLIC BLOOD PRESSURE: 128 MMHG | DIASTOLIC BLOOD PRESSURE: 91 MMHG

## 2025-02-14 VITALS — OXYGEN SATURATION: 100 % | TEMPERATURE: 97.6 F

## 2025-02-14 NOTE — RAD REPORT
EXAM DESCRIPTION:



Chest Pa And Lat (2 Views)   



CLINICAL HISTORY:



Congestion;Cough



TECHNIQUE:



PA and lateral



COMPARISON:



September 2023



FINDINGS:



CHEST:



Heart: The cardiomediastinal silhouette is within normal limits.



Lungs: No focal consolidation.



Mediastinum: Unremarkable



Pleura: No appreciable effusion. No pneumothorax.



Bones: Intact



IMPRESSION:



No acute cardiopulmonary disease.



Electronically signed by:   Av Burr MD   02/13/2025 11:31 PM [facility] Workstation: [id number]



Due to temporary technical issues with the PACS/[employer] reporting system, reports are being erma
d by the in-house radiologist without review as a courtesy to ensure prompt reporting the

interpreting radiologist is fully responsible for the content of the report.



Transcribed Date/Time: 2/14/2025 5:33 AM

## 2025-04-20 ENCOUNTER — HOSPITAL ENCOUNTER (EMERGENCY)
Dept: HOSPITAL 97 - ER | Age: 30
LOS: 1 days | Discharge: HOME | End: 2025-04-21
Payer: COMMERCIAL

## 2025-04-20 DIAGNOSIS — L03.211: Primary | ICD-10-CM

## 2025-04-20 DIAGNOSIS — D64.9: ICD-10-CM

## 2025-04-20 DIAGNOSIS — K08.89: ICD-10-CM

## 2025-04-20 LAB
HGB BLD-MCNC: 8.4 G/DL (ref 12–15)
MCH RBC QN AUTO: 22.1 PG (ref 27–35)
MCHC RBC AUTO-ENTMCNC: 32.4 G/DL (ref 32–36)
MCV RBC: 68.4 FL (ref 80–100)
NRBC BLD AUTO-RTO: 0.1 % (ref 0–0)
PMV BLD: 8.2 FL (ref 7.6–11.3)
RBC # BLD: 3.79 M/UL (ref 3.86–4.86)

## 2025-04-20 PROCEDURE — 80048 BASIC METABOLIC PNL TOTAL CA: CPT

## 2025-04-20 PROCEDURE — 85025 COMPLETE CBC W/AUTO DIFF WBC: CPT

## 2025-04-20 PROCEDURE — 76377 3D RENDER W/INTRP POSTPROCES: CPT

## 2025-04-20 PROCEDURE — 96365 THER/PROPH/DIAG IV INF INIT: CPT

## 2025-04-20 PROCEDURE — 70487 CT MAXILLOFACIAL W/DYE: CPT

## 2025-04-20 PROCEDURE — 81001 URINALYSIS AUTO W/SCOPE: CPT

## 2025-04-20 PROCEDURE — 99284 EMERGENCY DEPT VISIT MOD MDM: CPT

## 2025-04-20 PROCEDURE — 81025 URINE PREGNANCY TEST: CPT

## 2025-04-20 PROCEDURE — 96375 TX/PRO/DX INJ NEW DRUG ADDON: CPT

## 2025-04-20 PROCEDURE — 96361 HYDRATE IV INFUSION ADD-ON: CPT

## 2025-04-20 PROCEDURE — 36415 COLL VENOUS BLD VENIPUNCTURE: CPT

## 2025-04-21 VITALS — TEMPERATURE: 97.1 F

## 2025-04-21 VITALS — OXYGEN SATURATION: 98 % | SYSTOLIC BLOOD PRESSURE: 127 MMHG | DIASTOLIC BLOOD PRESSURE: 90 MMHG

## 2025-04-21 LAB
ANION GAP SERPL CALC-SCNC: 4.6 MEQ/L (ref 5–15)
BLD SMEAR INTERP: (no result)
MICROCYTES BLD QL SMEAR: (no result)
MORPHOLOGY BLD-IMP: (no result)
POTASSIUM SERPL-SCNC: 3.6 MEQ/L (ref 3.5–5.1)
UA COMPLETE W REFLEX CULTURE PNL UR: (no result)